# Patient Record
Sex: FEMALE | Race: WHITE | Employment: OTHER | URBAN - METROPOLITAN AREA
[De-identification: names, ages, dates, MRNs, and addresses within clinical notes are randomized per-mention and may not be internally consistent; named-entity substitution may affect disease eponyms.]

---

## 2018-08-30 ENCOUNTER — APPOINTMENT (OUTPATIENT)
Dept: CT IMAGING | Age: 68
DRG: 086 | End: 2018-08-30
Payer: MEDICARE

## 2018-08-30 ENCOUNTER — APPOINTMENT (OUTPATIENT)
Dept: GENERAL RADIOLOGY | Age: 68
DRG: 086 | End: 2018-08-30
Payer: MEDICARE

## 2018-08-30 ENCOUNTER — HOSPITAL ENCOUNTER (INPATIENT)
Age: 68
LOS: 6 days | Discharge: NURSING, REHAB OR LONG-TERM CARE | DRG: 086 | End: 2018-09-06
Attending: EMERGENCY MEDICINE | Admitting: SURGERY
Payer: MEDICARE

## 2018-08-30 DIAGNOSIS — I62.00 SUBDURAL HEMORRHAGE (HCC): ICD-10-CM

## 2018-08-30 DIAGNOSIS — S22.41XA CLOSED FRACTURE OF MULTIPLE RIBS OF RIGHT SIDE, INITIAL ENCOUNTER: ICD-10-CM

## 2018-08-30 DIAGNOSIS — S43.024A CLOSED POSTERIOR DISLOCATION OF RIGHT SHOULDER, INITIAL ENCOUNTER: Primary | ICD-10-CM

## 2018-08-30 PROBLEM — S06.0X1A CONCUSSION WITH LOSS OF CONSCIOUSNESS OF 30 MINUTES OR LESS: Status: ACTIVE | Noted: 2018-08-30

## 2018-08-30 PROBLEM — S43.004A DISLOCATION OF RIGHT SHOULDER JOINT: Status: ACTIVE | Noted: 2018-08-30

## 2018-08-30 PROBLEM — I60.9 SAH (SUBARACHNOID HEMORRHAGE) (HCC): Status: ACTIVE | Noted: 2018-08-30

## 2018-08-30 LAB
% INHIBITION AA: 100 %
% INHIBITION ADP: 51.5 %
ABO/RH: NORMAL
ACETAMINOPHEN LEVEL: <5 MCG/ML (ref 10–30)
ALBUMIN SERPL-MCNC: 4.2 G/DL (ref 3.5–5.2)
ALP BLD-CCNC: 68 U/L (ref 35–104)
ALT SERPL-CCNC: 86 U/L (ref 0–32)
ANGLE (CLOT STRENGTH): 76.1 DEGREE (ref 59–74)
ANION GAP SERPL CALCULATED.3IONS-SCNC: 14 MMOL/L (ref 7–16)
ANTIBODY SCREEN: NORMAL
APTT: 28.1 SEC (ref 24.5–35.1)
AST SERPL-CCNC: 138 U/L (ref 0–31)
B.E.: -1.7 MMOL/L (ref -3–3)
BILIRUB SERPL-MCNC: 0.4 MG/DL (ref 0–1.2)
BUN BLDV-MCNC: 21 MG/DL (ref 8–23)
CALCIUM SERPL-MCNC: 9.2 MG/DL (ref 8.6–10.2)
CHLORIDE BLD-SCNC: 103 MMOL/L (ref 98–107)
CO2: 23 MMOL/L (ref 22–29)
COHB: 0.5 % (ref 0–1.5)
COMMENT: ABNORMAL
CREAT SERPL-MCNC: 1.4 MG/DL (ref 0.5–1)
CRITICAL: ABNORMAL
DATE ANALYZED: ABNORMAL
DATE OF COLLECTION: ABNORMAL
EPL-TEG: 16.9 % (ref 0–15)
ETHANOL: <10 MG/DL (ref 0–0.08)
G-TEG: 8.3 K D/SC (ref 4.5–11)
GFR AFRICAN AMERICAN: 45
GFR NON-AFRICAN AMERICAN: 37 ML/MIN/1.73
GLUCOSE BLD-MCNC: 122 MG/DL (ref 74–109)
HCO3: 19.3 MMOL/L (ref 22–26)
HCT VFR BLD CALC: 40.6 % (ref 34–48)
HEMOGLOBIN: 13.8 G/DL (ref 11.5–15.5)
HHB: 0.6 % (ref 0–5)
INR BLD: 1
K (CLOTTING TIME): 0.9 MIN (ref 1–3)
LAB: ABNORMAL
LACTIC ACID: 1.4 MMOL/L (ref 0.5–2.2)
LY30 (FIBRINOLYSIS): 9.6 % (ref 0–8)
Lab: 1828
MA (MAX AMPLITUDE): 62.3 MM (ref 50–70)
MA-AA: 8.4 MM
MA-ACTIVATED: 9.3 MM
MA-ADP: 35 MM
MA-TEG BASELINE: 62.3 MM
MCH RBC QN AUTO: 29.5 PG (ref 26–35)
MCHC RBC AUTO-ENTMCNC: 34 % (ref 32–34.5)
MCV RBC AUTO: 86.8 FL (ref 80–99.9)
METHB: 0.1 % (ref 0–1.5)
MODE: ABNORMAL
O2 SATURATION: 99.4 % (ref 92–98.5)
O2HB: 98.8 % (ref 94–97)
OPERATOR ID: ABNORMAL
PATIENT TEMP: 37 C
PCO2: 24.3 MMHG (ref 35–45)
PDW BLD-RTO: 12.9 FL (ref 11.5–15)
PH BLOOD GAS: 7.52 (ref 7.35–7.45)
PLATELET # BLD: 243 E9/L (ref 130–450)
PMV BLD AUTO: 9.9 FL (ref 7–12)
PO2: 228.9 MMHG (ref 60–100)
POTASSIUM SERPL-SCNC: 3.51 MMOL/L (ref 3.3–5.1)
POTASSIUM SERPL-SCNC: 3.7 MMOL/L (ref 3.5–5)
PROTHROMBIN TIME: 11 SEC (ref 9.3–12.4)
R (REACTION TIME): 3.6 MIN (ref 5–10)
RBC # BLD: 4.68 E12/L (ref 3.5–5.5)
SALICYLATE, SERUM: <0.3 MG/DL (ref 0–30)
SODIUM BLD-SCNC: 140 MMOL/L (ref 132–146)
SOURCE, BLOOD GAS: ABNORMAL
THB: 14.5 G/DL (ref 11.5–16.5)
TIME ANALYZED: 1832
TOTAL PROTEIN: 6.7 G/DL (ref 6.4–8.3)
TRICYCLIC ANTIDEPRESSANTS SCREEN SERUM: NEGATIVE NG/ML
WBC # BLD: 10.6 E9/L (ref 4.5–11.5)

## 2018-08-30 PROCEDURE — 99223 1ST HOSP IP/OBS HIGH 75: CPT | Performed by: SURGERY

## 2018-08-30 PROCEDURE — 94150 VITAL CAPACITY TEST: CPT

## 2018-08-30 PROCEDURE — 85027 COMPLETE CBC AUTOMATED: CPT

## 2018-08-30 PROCEDURE — 2500000003 HC RX 250 WO HCPCS: Performed by: EMERGENCY MEDICINE

## 2018-08-30 PROCEDURE — 85576 BLOOD PLATELET AGGREGATION: CPT

## 2018-08-30 PROCEDURE — 99291 CRITICAL CARE FIRST HOUR: CPT

## 2018-08-30 PROCEDURE — 74177 CT ABD & PELVIS W/CONTRAST: CPT

## 2018-08-30 PROCEDURE — 96375 TX/PRO/DX INJ NEW DRUG ADDON: CPT

## 2018-08-30 PROCEDURE — 73590 X-RAY EXAM OF LOWER LEG: CPT

## 2018-08-30 PROCEDURE — G0480 DRUG TEST DEF 1-7 CLASSES: HCPCS

## 2018-08-30 PROCEDURE — 86850 RBC ANTIBODY SCREEN: CPT

## 2018-08-30 PROCEDURE — 70486 CT MAXILLOFACIAL W/O DYE: CPT

## 2018-08-30 PROCEDURE — 72125 CT NECK SPINE W/O DYE: CPT

## 2018-08-30 PROCEDURE — 85384 FIBRINOGEN ACTIVITY: CPT

## 2018-08-30 PROCEDURE — 6360000002 HC RX W HCPCS: Performed by: STUDENT IN AN ORGANIZED HEALTH CARE EDUCATION/TRAINING PROGRAM

## 2018-08-30 PROCEDURE — 84132 ASSAY OF SERUM POTASSIUM: CPT

## 2018-08-30 PROCEDURE — 6360000002 HC RX W HCPCS

## 2018-08-30 PROCEDURE — 72170 X-RAY EXAM OF PELVIS: CPT

## 2018-08-30 PROCEDURE — 0RSJXZZ REPOSITION RIGHT SHOULDER JOINT, EXTERNAL APPROACH: ICD-10-PCS | Performed by: EMERGENCY MEDICINE

## 2018-08-30 PROCEDURE — 80053 COMPREHEN METABOLIC PANEL: CPT

## 2018-08-30 PROCEDURE — 85610 PROTHROMBIN TIME: CPT

## 2018-08-30 PROCEDURE — 26600 TREAT METACARPAL FRACTURE: CPT | Performed by: ORTHOPAEDIC SURGERY

## 2018-08-30 PROCEDURE — 96374 THER/PROPH/DIAG INJ IV PUSH: CPT

## 2018-08-30 PROCEDURE — 86900 BLOOD TYPING SEROLOGIC ABO: CPT

## 2018-08-30 PROCEDURE — 6360000004 HC RX CONTRAST MEDICATION: Performed by: RADIOLOGY

## 2018-08-30 PROCEDURE — 36410 VNPNXR 3YR/> PHY/QHP DX/THER: CPT | Performed by: SURGERY

## 2018-08-30 PROCEDURE — 85730 THROMBOPLASTIN TIME PARTIAL: CPT

## 2018-08-30 PROCEDURE — 2700000000 HC OXYGEN THERAPY PER DAY

## 2018-08-30 PROCEDURE — 73130 X-RAY EXAM OF HAND: CPT

## 2018-08-30 PROCEDURE — 73030 X-RAY EXAM OF SHOULDER: CPT

## 2018-08-30 PROCEDURE — 82805 BLOOD GASES W/O2 SATURATION: CPT

## 2018-08-30 PROCEDURE — G0390 TRAUMA RESPONS W/HOSP CRITI: HCPCS

## 2018-08-30 PROCEDURE — 96376 TX/PRO/DX INJ SAME DRUG ADON: CPT

## 2018-08-30 PROCEDURE — 70450 CT HEAD/BRAIN W/O DYE: CPT

## 2018-08-30 PROCEDURE — 36600 WITHDRAWAL OF ARTERIAL BLOOD: CPT | Performed by: SURGERY

## 2018-08-30 PROCEDURE — 85347 COAGULATION TIME ACTIVATED: CPT

## 2018-08-30 PROCEDURE — 73060 X-RAY EXAM OF HUMERUS: CPT

## 2018-08-30 PROCEDURE — 2580000003 HC RX 258: Performed by: STUDENT IN AN ORGANIZED HEALTH CARE EDUCATION/TRAINING PROGRAM

## 2018-08-30 PROCEDURE — 73070 X-RAY EXAM OF ELBOW: CPT

## 2018-08-30 PROCEDURE — 80307 DRUG TEST PRSMV CHEM ANLYZR: CPT

## 2018-08-30 PROCEDURE — 73560 X-RAY EXAM OF KNEE 1 OR 2: CPT

## 2018-08-30 PROCEDURE — 71260 CT THORAX DX C+: CPT

## 2018-08-30 PROCEDURE — 99221 1ST HOSP IP/OBS SF/LOW 40: CPT | Performed by: ORTHOPAEDIC SURGERY

## 2018-08-30 PROCEDURE — 86901 BLOOD TYPING SEROLOGIC RH(D): CPT

## 2018-08-30 PROCEDURE — 36415 COLL VENOUS BLD VENIPUNCTURE: CPT

## 2018-08-30 PROCEDURE — 71045 X-RAY EXAM CHEST 1 VIEW: CPT

## 2018-08-30 PROCEDURE — 73200 CT UPPER EXTREMITY W/O DYE: CPT

## 2018-08-30 PROCEDURE — 83605 ASSAY OF LACTIC ACID: CPT

## 2018-08-30 PROCEDURE — 6360000002 HC RX W HCPCS: Performed by: EMERGENCY MEDICINE

## 2018-08-30 PROCEDURE — 73090 X-RAY EXAM OF FOREARM: CPT

## 2018-08-30 RX ORDER — KETAMINE HYDROCHLORIDE 10 MG/ML
1 INJECTION, SOLUTION INTRAMUSCULAR; INTRAVENOUS ONCE
Status: COMPLETED | OUTPATIENT
Start: 2018-08-30 | End: 2018-08-30

## 2018-08-30 RX ORDER — PROPOFOL 10 MG/ML
1 INJECTION, EMULSION INTRAVENOUS ONCE
Status: COMPLETED | OUTPATIENT
Start: 2018-08-30 | End: 2018-08-30

## 2018-08-30 RX ORDER — MORPHINE SULFATE 2 MG/ML
2 INJECTION, SOLUTION INTRAMUSCULAR; INTRAVENOUS
Status: DISCONTINUED | OUTPATIENT
Start: 2018-08-30 | End: 2018-09-06 | Stop reason: HOSPADM

## 2018-08-30 RX ORDER — LEVETIRACETAM 5 MG/ML
500 INJECTION INTRAVASCULAR EVERY 12 HOURS
Status: DISCONTINUED | OUTPATIENT
Start: 2018-08-30 | End: 2018-08-31

## 2018-08-30 RX ORDER — PROPOFOL 10 MG/ML
INJECTION, EMULSION INTRAVENOUS
Status: DISCONTINUED
Start: 2018-08-30 | End: 2018-08-31

## 2018-08-30 RX ORDER — SODIUM CHLORIDE 9 MG/ML
INJECTION, SOLUTION INTRAVENOUS CONTINUOUS
Status: DISCONTINUED | OUTPATIENT
Start: 2018-08-30 | End: 2018-08-31 | Stop reason: ALTCHOICE

## 2018-08-30 RX ORDER — FENTANYL CITRATE 50 UG/ML
INJECTION, SOLUTION INTRAMUSCULAR; INTRAVENOUS DAILY PRN
Status: COMPLETED | OUTPATIENT
Start: 2018-08-30 | End: 2018-08-30

## 2018-08-30 RX ORDER — ONDANSETRON 2 MG/ML
4 INJECTION INTRAMUSCULAR; INTRAVENOUS EVERY 6 HOURS PRN
Status: DISCONTINUED | OUTPATIENT
Start: 2018-08-30 | End: 2018-09-06 | Stop reason: HOSPADM

## 2018-08-30 RX ORDER — ONDANSETRON 2 MG/ML
INJECTION INTRAMUSCULAR; INTRAVENOUS DAILY PRN
Status: COMPLETED | OUTPATIENT
Start: 2018-08-30 | End: 2018-08-30

## 2018-08-30 RX ADMIN — IOPAMIDOL 110 ML: 755 INJECTION, SOLUTION INTRAVENOUS at 19:33

## 2018-08-30 RX ADMIN — FENTANYL CITRATE 50 MCG: 50 INJECTION, SOLUTION INTRAMUSCULAR; INTRAVENOUS at 18:33

## 2018-08-30 RX ADMIN — ONDANSETRON 4 MG: 2 SOLUTION INTRAMUSCULAR; INTRAVENOUS at 20:01

## 2018-08-30 RX ADMIN — SODIUM CHLORIDE: 9 INJECTION, SOLUTION INTRAVENOUS at 18:35

## 2018-08-30 RX ADMIN — PROPOFOL 20 MG: 10 INJECTION, EMULSION INTRAVENOUS at 20:30

## 2018-08-30 RX ADMIN — MORPHINE SULFATE 2 MG: 2 INJECTION, SOLUTION INTRAMUSCULAR; INTRAVENOUS at 20:00

## 2018-08-30 RX ADMIN — PROPOFOL 40 MG: 10 INJECTION, EMULSION INTRAVENOUS at 20:30

## 2018-08-30 RX ADMIN — KETAMINE HYDROCHLORIDE 40 MG: 10 INJECTION, SOLUTION INTRAMUSCULAR; INTRAVENOUS at 20:30

## 2018-08-30 RX ADMIN — LEVETIRACETAM 500 MG: 5 INJECTION INTRAVENOUS at 20:57

## 2018-08-30 RX ADMIN — ONDANSETRON HYDROCHLORIDE 4 MG: 2 INJECTION, SOLUTION INTRAMUSCULAR; INTRAVENOUS at 18:33

## 2018-08-30 ASSESSMENT — PAIN SCALES - GENERAL
PAINLEVEL_OUTOF10: 10
PAINLEVEL_OUTOF10: 10

## 2018-08-30 NOTE — ED NOTES
To RUE  brusing     deformity to R shoulder  R elbow   pupils 5+ reactive  Bilaterally     GCS [address], RN  08/30/18 8950
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
## 2018-08-30 NOTE — ED NOTES
Dr. Kaela Mead PCP  No smoke  ETOH occasionally  No drug use     Ecchynosis to L side of face, L side of hip, R , R lower leg      Lux Lloyd, RN  08/30/18 Pk Hudson, RN  08/30/18 [id number]
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
## 2018-08-30 NOTE — CONSULTS
anxiety    PHYSICAL EXAM:    VITALS:  /71   Pulse 63   Temp 97 °F (36.1 °C)   Resp 21   Ht 5' 4\" (1.626 m)   Wt 170 lb (77.1 kg)   SpO2 93%   BMI 29.18 kg/m²   CONSTITUTIONAL:  awake, alert, cooperative, no apparent distress, and appears stated age  MUSCULOSKELETAL:  Right upper Extremity:  · Right shoulder Skin intact circumferentially  · +TTP right shoulder, elbow and hand  · Positive tenderness of the ulnar aspect of the 4th and 5th metacarpal  · Positive crossover finger deformity about 60% of the 4th finger under the 3rd finger  · Positive ecchymosis over the 4th and 5th metacarpals. · Shoulder in internal and adducted position  ·  Compartments soft and compressible  · +AIN/PIN/Ulnar nerve function intact grossly  · +Radial pulse, Brisk Cap refill, hand warm and perfused  · Sensation intact to touch in radial/ulnar/median nerve distributions to hand      Secondary Exam:   · leftUE: No obvious signs of trauma. -TTP to fingers, hand, wrist, forearm, elbow, humerus, shoulder or clavicle. compartments soft and compressible.     · rightLE: + tenderness and ecchymosis over the proximal tibia     DATA:    CBC:   Lab Results   Component Value Date    WBC 10.6 08/30/2018    RBC 4.68 08/30/2018    HGB 13.8 08/30/2018    HCT 40.6 08/30/2018    MCV 86.8 08/30/2018    MCH 29.5 08/30/2018    MCHC 34.0 08/30/2018    RDW 12.9 08/30/2018     08/30/2018    MPV 9.9 08/30/2018     PT/INR:    Lab Results   Component Value Date    PROTIME 11.0 08/30/2018    INR 1.0 08/30/2018     CRP/ESR: No results found for: CRP, SEDRATE  Lactic Acid :   Lab Results   Component Value Date    LACTA 1.4 08/30/2018       Radiology Review:  08/30/18 - CT Chest  R Posterior Shoulder dislocation with reverse hill sachs lesion with no glenoid fractures     Right hand x-ray  Demonstrating 4th metacarpal base fracture    CT abdomen and pelvis  Comminuted intra-articular 4th metacarpal base fracture seen on CT of the abdomen with mild subluxation dorsally. X-ray right elbow   no acute fractures or dislocations    X-ray right radius and ulna  No acute fractures or dislocations noted    Postreduction CT scan right shoulder  Demonstrating adequate reduction, small reverse Hill-Sachs lesion, no associated glenoid fractures noted. IMPRESSION:   Right Posterior Shoulder dislocation with reverse hill sachs lesion  4th metacarpal base fracture    PLAN:  NWB - RUE  After informed consent was verbally obtained, Closed reduction was then performed with sedation by the ER staff,  application of sling. Patient tolerated well and remained neurovascularly intact pre and post reduction  Pain Control per trauma  Well-padded ulnar gutter splint applied to the right hand.   Most likely operative fixation for 4th metacarpal fracture base dislocation  Nothing by mouth after midnight  Continue ice and elevation to decrease swelling  · Xr pending of other extremities at this time  · Discussed with Dr. Greg Costa
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
## 2018-08-30 NOTE — ED NOTES
Pt returned from CT . Pt placed on cardiac monitor and pulse oximeter. Pt blood pressure cycling every 15 minutes. Fluids infusing in left AC. Report given to section RN. Pt alert and oriented x4.       Glen Varela RN  08/30/18 [id number]

## 2018-08-30 NOTE — ED NOTES
1 gold hoop earring placed in collection container and placed in patient belongings bag      Etta Hardwick RN  08/30/18 9373

## 2018-08-31 ENCOUNTER — APPOINTMENT (OUTPATIENT)
Dept: CT IMAGING | Age: 68
DRG: 086 | End: 2018-08-31
Payer: MEDICARE

## 2018-08-31 PROBLEM — S06.5XAA SUBDURAL HEMATOMA (HCC): Status: ACTIVE | Noted: 2018-08-31
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
## 2018-08-31 LAB
ALBUMIN SERPL-MCNC: 3.8 G/DL (ref 3.5–5.2)
ALP BLD-CCNC: 51 U/L (ref 35–104)
ALT SERPL-CCNC: 90 U/L (ref 0–32)
AMPHETAMINE SCREEN, URINE: NOT DETECTED
ANION GAP SERPL CALCULATED.3IONS-SCNC: 16 MMOL/L (ref 7–16)
AST SERPL-CCNC: 162 U/L (ref 0–31)
BARBITURATE SCREEN URINE: NOT DETECTED
BASOPHILS ABSOLUTE: 0.02 E9/L (ref 0–0.2)
BASOPHILS RELATIVE PERCENT: 0.2 % (ref 0–2)
BENZODIAZEPINE SCREEN, URINE: NOT DETECTED
BILIRUB SERPL-MCNC: 0.6 MG/DL (ref 0–1.2)
BUN BLDV-MCNC: 20 MG/DL (ref 8–23)
CALCIUM SERPL-MCNC: 8.7 MG/DL (ref 8.6–10.2)
CANNABINOID SCREEN URINE: NOT DETECTED
CHLORIDE BLD-SCNC: 103 MMOL/L (ref 98–107)
CO2: 22 MMOL/L (ref 22–29)
COCAINE METABOLITE SCREEN URINE: NOT DETECTED
CREAT SERPL-MCNC: 1.1 MG/DL (ref 0.5–1)
EOSINOPHILS ABSOLUTE: 0 E9/L (ref 0.05–0.5)
EOSINOPHILS RELATIVE PERCENT: 0 % (ref 0–6)
GFR AFRICAN AMERICAN: 60
GFR NON-AFRICAN AMERICAN: 50 ML/MIN/1.73
GLUCOSE BLD-MCNC: 162 MG/DL (ref 74–109)
HCT VFR BLD CALC: 35.9 % (ref 34–48)
HEMOGLOBIN: 12 G/DL (ref 11.5–15.5)
IMMATURE GRANULOCYTES #: 0.05 E9/L
IMMATURE GRANULOCYTES %: 0.6 % (ref 0–5)
LYMPHOCYTES ABSOLUTE: 0.79 E9/L (ref 1.5–4)
LYMPHOCYTES RELATIVE PERCENT: 9.1 % (ref 20–42)
MCH RBC QN AUTO: 29.6 PG (ref 26–35)
MCHC RBC AUTO-ENTMCNC: 33.4 % (ref 32–34.5)
MCV RBC AUTO: 88.6 FL (ref 80–99.9)
METHADONE SCREEN, URINE: NOT DETECTED
MONOCYTES ABSOLUTE: 0.76 E9/L (ref 0.1–0.95)
MONOCYTES RELATIVE PERCENT: 8.8 % (ref 2–12)
NEUTROPHILS ABSOLUTE: 7.04 E9/L (ref 1.8–7.3)
NEUTROPHILS RELATIVE PERCENT: 81.3 % (ref 43–80)
OPIATE SCREEN URINE: POSITIVE
PDW BLD-RTO: 12.9 FL (ref 11.5–15)
PHENCYCLIDINE SCREEN URINE: NOT DETECTED
PLATELET # BLD: 222 E9/L (ref 130–450)
PMV BLD AUTO: 9.9 FL (ref 7–12)
POTASSIUM REFLEX MAGNESIUM: 3.6 MMOL/L (ref 3.5–5)
PROPOXYPHENE SCREEN: NOT DETECTED
RBC # BLD: 4.05 E12/L (ref 3.5–5.5)
SODIUM BLD-SCNC: 141 MMOL/L (ref 132–146)
TOTAL PROTEIN: 6 G/DL (ref 6.4–8.3)
WBC # BLD: 8.7 E9/L (ref 4.5–11.5)

## 2018-08-31 PROCEDURE — 70450 CT HEAD/BRAIN W/O DYE: CPT

## 2018-08-31 PROCEDURE — 6360000002 HC RX W HCPCS: Performed by: STUDENT IN AN ORGANIZED HEALTH CARE EDUCATION/TRAINING PROGRAM

## 2018-08-31 PROCEDURE — 2060000000 HC ICU INTERMEDIATE R&B

## 2018-08-31 PROCEDURE — 99222 1ST HOSP IP/OBS MODERATE 55: CPT | Performed by: NEUROLOGICAL SURGERY

## 2018-08-31 PROCEDURE — 2580000003 HC RX 258: Performed by: STUDENT IN AN ORGANIZED HEALTH CARE EDUCATION/TRAINING PROGRAM

## 2018-08-31 PROCEDURE — 99232 SBSQ HOSP IP/OBS MODERATE 35: CPT | Performed by: SURGERY

## 2018-08-31 PROCEDURE — 6370000000 HC RX 637 (ALT 250 FOR IP): Performed by: SURGERY

## 2018-08-31 PROCEDURE — 2700000000 HC OXYGEN THERAPY PER DAY

## 2018-08-31 PROCEDURE — G0480 DRUG TEST DEF 1-7 CLASSES: HCPCS

## 2018-08-31 PROCEDURE — 36415 COLL VENOUS BLD VENIPUNCTURE: CPT

## 2018-08-31 PROCEDURE — 80053 COMPREHEN METABOLIC PANEL: CPT

## 2018-08-31 PROCEDURE — 80307 DRUG TEST PRSMV CHEM ANLYZR: CPT

## 2018-08-31 PROCEDURE — 99024 POSTOP FOLLOW-UP VISIT: CPT | Performed by: ORTHOPAEDIC SURGERY

## 2018-08-31 PROCEDURE — 85025 COMPLETE CBC W/AUTO DIFF WBC: CPT

## 2018-08-31 PROCEDURE — 6370000000 HC RX 637 (ALT 250 FOR IP): Performed by: STUDENT IN AN ORGANIZED HEALTH CARE EDUCATION/TRAINING PROGRAM

## 2018-08-31 RX ORDER — SODIUM CHLORIDE 0.9 % (FLUSH) 0.9 %
10 SYRINGE (ML) INJECTION PRN
Status: DISCONTINUED | OUTPATIENT
Start: 2018-08-31 | End: 2018-09-06 | Stop reason: HOSPADM

## 2018-08-31 RX ORDER — PANTOPRAZOLE SODIUM 40 MG/1
40 TABLET, DELAYED RELEASE ORAL DAILY
COMMUNITY
End: 2020-08-28 | Stop reason: SDUPTHER

## 2018-08-31 RX ORDER — BISACODYL 10 MG
10 SUPPOSITORY, RECTAL RECTAL DAILY PRN
Status: DISCONTINUED | OUTPATIENT
Start: 2018-08-31 | End: 2018-09-06 | Stop reason: HOSPADM

## 2018-08-31 RX ORDER — OXYCODONE HYDROCHLORIDE 10 MG/1
10 TABLET ORAL EVERY 4 HOURS PRN
Status: DISCONTINUED | OUTPATIENT
Start: 2018-08-31 | End: 2018-09-06 | Stop reason: HOSPADM

## 2018-08-31 RX ORDER — SODIUM CHLORIDE 0.9 % (FLUSH) 0.9 %
10 SYRINGE (ML) INJECTION EVERY 12 HOURS SCHEDULED
Status: DISCONTINUED | OUTPATIENT
Start: 2018-08-31 | End: 2018-09-06 | Stop reason: HOSPADM

## 2018-08-31 RX ORDER — VITAMIN B COMPLEX
1 CAPSULE ORAL DAILY
COMMUNITY

## 2018-08-31 RX ORDER — HYOSCYAMINE SULFATE 0.125 MG
125 TABLET ORAL EVERY 4 HOURS PRN
COMMUNITY
End: 2018-11-14 | Stop reason: SDUPTHER

## 2018-08-31 RX ORDER — TRIAMTERENE AND HYDROCHLOROTHIAZIDE 37.5; 25 MG/1; MG/1
1 TABLET ORAL DAILY
COMMUNITY
End: 2020-08-28 | Stop reason: SDUPTHER

## 2018-08-31 RX ORDER — LEVOTHYROXINE SODIUM 0.03 MG/1
25 TABLET ORAL DAILY
COMMUNITY
End: 2018-11-14 | Stop reason: SDUPTHER

## 2018-08-31 RX ORDER — SODIUM CHLORIDE 9 MG/ML
INJECTION, SOLUTION INTRAVENOUS CONTINUOUS
Status: DISCONTINUED | OUTPATIENT
Start: 2018-08-31 | End: 2018-09-04

## 2018-08-31 RX ORDER — ESTRADIOL AND NORETHINDRONE ACETATE .5; .1 MG/1; MG/1
1 TABLET ORAL DAILY
COMMUNITY
End: 2022-01-03 | Stop reason: SDUPTHER

## 2018-08-31 RX ORDER — ALPRAZOLAM 1 MG/1
1 TABLET ORAL 2 TIMES DAILY PRN
COMMUNITY
End: 2020-10-09 | Stop reason: SDUPTHER

## 2018-08-31 RX ORDER — AMLODIPINE BESYLATE 10 MG/1
10 TABLET ORAL
COMMUNITY
End: 2018-11-14 | Stop reason: DRUGHIGH

## 2018-08-31 RX ORDER — LEVETIRACETAM 500 MG/1
500 TABLET ORAL 2 TIMES DAILY
Status: COMPLETED | OUTPATIENT
Start: 2018-08-31 | End: 2018-09-06

## 2018-08-31 RX ORDER — ACETAMINOPHEN 325 MG/1
650 TABLET ORAL EVERY 4 HOURS PRN
Status: DISCONTINUED | OUTPATIENT
Start: 2018-08-31 | End: 2018-09-06 | Stop reason: HOSPADM

## 2018-08-31 RX ORDER — MORPHINE SULFATE 2 MG/ML
2 INJECTION, SOLUTION INTRAMUSCULAR; INTRAVENOUS EVERY 4 HOURS PRN
Status: DISCONTINUED | OUTPATIENT
Start: 2018-08-31 | End: 2018-09-06 | Stop reason: HOSPADM

## 2018-08-31 RX ORDER — AMOXICILLIN 500 MG
1 CAPSULE ORAL DAILY
COMMUNITY
End: 2018-11-14 | Stop reason: SDUPTHER

## 2018-08-31 RX ORDER — DOCUSATE SODIUM 100 MG/1
100 CAPSULE, LIQUID FILLED ORAL 2 TIMES DAILY
Status: DISCONTINUED | OUTPATIENT
Start: 2018-08-31 | End: 2018-09-06 | Stop reason: HOSPADM

## 2018-08-31 RX ORDER — OXYCODONE HYDROCHLORIDE 5 MG/1
5 TABLET ORAL EVERY 4 HOURS PRN
Status: DISCONTINUED | OUTPATIENT
Start: 2018-08-31 | End: 2018-09-06 | Stop reason: HOSPADM

## 2018-08-31 RX ORDER — LIDOCAINE 50 MG/G
1 PATCH TOPICAL DAILY
Status: DISCONTINUED | OUTPATIENT
Start: 2018-08-31 | End: 2018-09-06 | Stop reason: HOSPADM

## 2018-08-31 RX ORDER — OMEGA-3S/DHA/EPA/FISH OIL/D3 300MG-1000
400 CAPSULE ORAL DAILY
COMMUNITY

## 2018-08-31 RX ADMIN — Medication 10 ML: at 20:44

## 2018-08-31 RX ADMIN — SODIUM CHLORIDE: 9 INJECTION, SOLUTION INTRAVENOUS at 01:42

## 2018-08-31 RX ADMIN — LEVETIRACETAM 500 MG: 500 TABLET, FILM COATED ORAL at 09:00

## 2018-08-31 RX ADMIN — MORPHINE SULFATE 2 MG: 2 INJECTION, SOLUTION INTRAMUSCULAR; INTRAVENOUS at 20:44

## 2018-08-31 RX ADMIN — DOCUSATE SODIUM 100 MG: 100 CAPSULE, LIQUID FILLED ORAL at 20:44

## 2018-08-31 RX ADMIN — MORPHINE SULFATE 2 MG: 2 INJECTION, SOLUTION INTRAMUSCULAR; INTRAVENOUS at 03:17

## 2018-08-31 RX ADMIN — ACETAMINOPHEN 650 MG: 325 TABLET, FILM COATED ORAL at 05:45

## 2018-08-31 RX ADMIN — OXYCODONE HYDROCHLORIDE 5 MG: 5 TABLET ORAL at 14:53

## 2018-08-31 RX ADMIN — OXYCODONE HYDROCHLORIDE 10 MG: 10 TABLET ORAL at 05:45

## 2018-08-31 RX ADMIN — SODIUM CHLORIDE: 9 INJECTION, SOLUTION INTRAVENOUS at 20:44

## 2018-08-31 RX ADMIN — LEVETIRACETAM 500 MG: 500 TABLET, FILM COATED ORAL at 20:44

## 2018-08-31 RX ADMIN — SODIUM CHLORIDE: 9 INJECTION, SOLUTION INTRAVENOUS at 03:12

## 2018-08-31 RX ADMIN — DOCUSATE SODIUM 100 MG: 100 CAPSULE, LIQUID FILLED ORAL at 09:00

## 2018-08-31 ASSESSMENT — PAIN DESCRIPTION - ORIENTATION
ORIENTATION: RIGHT

## 2018-08-31 ASSESSMENT — PAIN DESCRIPTION - LOCATION
LOCATION: HEAD
LOCATION: HEAD;GENERALIZED

## 2018-08-31 ASSESSMENT — PAIN SCALES - GENERAL
PAINLEVEL_OUTOF10: 9
PAINLEVEL_OUTOF10: 0
PAINLEVEL_OUTOF10: 9
PAINLEVEL_OUTOF10: 5
PAINLEVEL_OUTOF10: 10
PAINLEVEL_OUTOF10: 9
PAINLEVEL_OUTOF10: 8
PAINLEVEL_OUTOF10: 9
PAINLEVEL_OUTOF10: 8
PAINLEVEL_OUTOF10: 10

## 2018-08-31 ASSESSMENT — PAIN DESCRIPTION - FREQUENCY
FREQUENCY: CONTINUOUS

## 2018-08-31 ASSESSMENT — PAIN DESCRIPTION - PAIN TYPE
TYPE: ACUTE PAIN

## 2018-08-31 ASSESSMENT — PAIN DESCRIPTION - DESCRIPTORS
DESCRIPTORS: ACHING;CONSTANT;DISCOMFORT
DESCRIPTORS: ACHING;CONSTANT;DISCOMFORT
DESCRIPTORS: DISCOMFORT;HEADACHE;SORE
DESCRIPTORS: ACHING;CONSTANT;DISCOMFORT

## 2018-08-31 ASSESSMENT — PAIN DESCRIPTION - PROGRESSION
CLINICAL_PROGRESSION: GRADUALLY IMPROVING
CLINICAL_PROGRESSION: GRADUALLY IMPROVING

## 2018-08-31 ASSESSMENT — PAIN DESCRIPTION - ONSET
ONSET: ON-GOING
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
## 2018-08-31 NOTE — PROGRESS NOTES
Physical Therapy  PT SESSION ATTEMPT     Date:2018  Patient Name: Emili Jolley  MRN: [id number]  : 1950  Room: [address] [place], [place] [address]     Attempted PT session this date:    [] unavailable due to other medical staff currently with pt   [] on hold per nursing staff   [] on hold per nursing staff secondary to lab / radiology results    [x] declined Physical Therapy  this date due to _pain__. Benefits of participation in therapy reviewed with pt.    [] off unit   [] Other:     Attempted PT multiple times 18. Pt off floor for CT in AM and refusing in PM secondary to pain. Educated on need for mobility and that PT will be in to see her tomorrow, 18. PT will re-attempt as able.      Tu Chow, PT, DPT  [id number]

## 2018-08-31 NOTE — PLAN OF CARE
Problem: Pain:  Goal: Pain level will decrease  Pain level will decrease   Outcome: Not Met This Shift  Patient has continued pain to head and left arm

## 2018-09-01 LAB
ALBUMIN SERPL-MCNC: 3.3 G/DL (ref 3.5–5.2)
ALP BLD-CCNC: 38 U/L (ref 35–104)
ALT SERPL-CCNC: 71 U/L (ref 0–32)
ANION GAP SERPL CALCULATED.3IONS-SCNC: 10 MMOL/L (ref 7–16)
AST SERPL-CCNC: 94 U/L (ref 0–31)
BASOPHILS ABSOLUTE: 0.05 E9/L (ref 0–0.2)
BASOPHILS RELATIVE PERCENT: 0.7 % (ref 0–2)
BILIRUB SERPL-MCNC: 0.6 MG/DL (ref 0–1.2)
BUN BLDV-MCNC: 18 MG/DL (ref 8–23)
CALCIUM SERPL-MCNC: 8.3 MG/DL (ref 8.6–10.2)
CHLORIDE BLD-SCNC: 104 MMOL/L (ref 98–107)
CO2: 25 MMOL/L (ref 22–29)
CREAT SERPL-MCNC: 0.9 MG/DL (ref 0.5–1)
EOSINOPHILS ABSOLUTE: 0.05 E9/L (ref 0.05–0.5)
EOSINOPHILS RELATIVE PERCENT: 0.7 % (ref 0–6)
GFR AFRICAN AMERICAN: >60
GFR NON-AFRICAN AMERICAN: >60 ML/MIN/1.73
GLUCOSE BLD-MCNC: 106 MG/DL (ref 74–109)
HCT VFR BLD CALC: 31.8 % (ref 34–48)
HEMOGLOBIN: 10.4 G/DL (ref 11.5–15.5)
IMMATURE GRANULOCYTES #: 0.02 E9/L
IMMATURE GRANULOCYTES %: 0.3 % (ref 0–5)
LYMPHOCYTES ABSOLUTE: 1.42 E9/L (ref 1.5–4)
LYMPHOCYTES RELATIVE PERCENT: 20.3 % (ref 20–42)
MAGNESIUM: 2.2 MG/DL (ref 1.6–2.6)
MCH RBC QN AUTO: 29.5 PG (ref 26–35)
MCHC RBC AUTO-ENTMCNC: 32.7 % (ref 32–34.5)
MCV RBC AUTO: 90.1 FL (ref 80–99.9)
MONOCYTES ABSOLUTE: 0.89 E9/L (ref 0.1–0.95)
MONOCYTES RELATIVE PERCENT: 12.7 % (ref 2–12)
NEUTROPHILS ABSOLUTE: 4.58 E9/L (ref 1.8–7.3)
NEUTROPHILS RELATIVE PERCENT: 65.3 % (ref 43–80)
PDW BLD-RTO: 13.1 FL (ref 11.5–15)
PLATELET # BLD: 184 E9/L (ref 130–450)
PMV BLD AUTO: 10.2 FL (ref 7–12)
POTASSIUM REFLEX MAGNESIUM: 3.3 MMOL/L (ref 3.5–5)
RBC # BLD: 3.53 E12/L (ref 3.5–5.5)
SODIUM BLD-SCNC: 139 MMOL/L (ref 132–146)
TOTAL PROTEIN: 5.3 G/DL (ref 6.4–8.3)
WBC # BLD: 7 E9/L (ref 4.5–11.5)

## 2018-09-01 PROCEDURE — G8978 MOBILITY CURRENT STATUS: HCPCS

## 2018-09-01 PROCEDURE — 99024 POSTOP FOLLOW-UP VISIT: CPT | Performed by: ORTHOPAEDIC SURGERY

## 2018-09-01 PROCEDURE — 80053 COMPREHEN METABOLIC PANEL: CPT

## 2018-09-01 PROCEDURE — 6370000000 HC RX 637 (ALT 250 FOR IP): Performed by: STUDENT IN AN ORGANIZED HEALTH CARE EDUCATION/TRAINING PROGRAM

## 2018-09-01 PROCEDURE — 99231 SBSQ HOSP IP/OBS SF/LOW 25: CPT | Performed by: SURGERY

## 2018-09-01 PROCEDURE — 85025 COMPLETE CBC W/AUTO DIFF WBC: CPT

## 2018-09-01 PROCEDURE — 36415 COLL VENOUS BLD VENIPUNCTURE: CPT

## 2018-09-01 PROCEDURE — 6360000002 HC RX W HCPCS: Performed by: STUDENT IN AN ORGANIZED HEALTH CARE EDUCATION/TRAINING PROGRAM

## 2018-09-01 PROCEDURE — 97530 THERAPEUTIC ACTIVITIES: CPT

## 2018-09-01 PROCEDURE — 83735 ASSAY OF MAGNESIUM: CPT

## 2018-09-01 PROCEDURE — 97167 OT EVAL HIGH COMPLEX 60 MIN: CPT

## 2018-09-01 PROCEDURE — G8987 SELF CARE CURRENT STATUS: HCPCS

## 2018-09-01 PROCEDURE — 6370000000 HC RX 637 (ALT 250 FOR IP): Performed by: SURGERY

## 2018-09-01 PROCEDURE — 2580000003 HC RX 258: Performed by: STUDENT IN AN ORGANIZED HEALTH CARE EDUCATION/TRAINING PROGRAM

## 2018-09-01 PROCEDURE — G8979 MOBILITY GOAL STATUS: HCPCS

## 2018-09-01 PROCEDURE — 2060000000 HC ICU INTERMEDIATE R&B

## 2018-09-01 PROCEDURE — 97162 PT EVAL MOD COMPLEX 30 MIN: CPT

## 2018-09-01 PROCEDURE — G8988 SELF CARE GOAL STATUS: HCPCS

## 2018-09-01 PROCEDURE — 97535 SELF CARE MNGMENT TRAINING: CPT

## 2018-09-01 PROCEDURE — 2700000000 HC OXYGEN THERAPY PER DAY

## 2018-09-01 RX ORDER — AMLODIPINE BESYLATE 10 MG/1
10 TABLET ORAL DAILY
Status: DISCONTINUED | OUTPATIENT
Start: 2018-09-01 | End: 2018-09-06 | Stop reason: HOSPADM

## 2018-09-01 RX ORDER — LEVOTHYROXINE SODIUM 0.03 MG/1
25 TABLET ORAL DAILY
Status: DISCONTINUED | OUTPATIENT
Start: 2018-09-01 | End: 2018-09-06 | Stop reason: HOSPADM

## 2018-09-01 RX ORDER — TRIAMTERENE AND HYDROCHLOROTHIAZIDE 37.5; 25 MG/1; MG/1
1 TABLET ORAL DAILY
Status: DISCONTINUED | OUTPATIENT
Start: 2018-09-01 | End: 2018-09-06 | Stop reason: HOSPADM

## 2018-09-01 RX ORDER — POTASSIUM CHLORIDE 20 MEQ/1
20 TABLET, EXTENDED RELEASE ORAL 2 TIMES DAILY WITH MEALS
Status: COMPLETED | OUTPATIENT
Start: 2018-09-01 | End: 2018-09-01

## 2018-09-01 RX ORDER — PANTOPRAZOLE SODIUM 40 MG/1
40 TABLET, DELAYED RELEASE ORAL DAILY
Status: DISCONTINUED | OUTPATIENT
Start: 2018-09-01 | End: 2018-09-06 | Stop reason: HOSPADM

## 2018-09-01 RX ORDER — HYOSCYAMINE SULFATE 0.125 MG
125 TABLET ORAL EVERY 4 HOURS PRN
Status: DISCONTINUED | OUTPATIENT
Start: 2018-09-01 | End: 2018-09-06 | Stop reason: HOSPADM

## 2018-09-01 RX ADMIN — LEVOTHYROXINE SODIUM 25 MCG: 25 TABLET ORAL at 11:12

## 2018-09-01 RX ADMIN — LEVETIRACETAM 500 MG: 500 TABLET, FILM COATED ORAL at 20:19

## 2018-09-01 RX ADMIN — MORPHINE SULFATE 2 MG: 2 INJECTION, SOLUTION INTRAMUSCULAR; INTRAVENOUS at 22:07

## 2018-09-01 RX ADMIN — AMLODIPINE BESYLATE 10 MG: 10 TABLET ORAL at 12:40

## 2018-09-01 RX ADMIN — DOCUSATE SODIUM 100 MG: 100 CAPSULE, LIQUID FILLED ORAL at 08:41

## 2018-09-01 RX ADMIN — POTASSIUM CHLORIDE 20 MEQ: 20 TABLET, EXTENDED RELEASE ORAL at 11:24

## 2018-09-01 RX ADMIN — OXYCODONE HYDROCHLORIDE 5 MG: 5 TABLET ORAL at 10:18

## 2018-09-01 RX ADMIN — MORPHINE SULFATE 2 MG: 2 INJECTION, SOLUTION INTRAMUSCULAR; INTRAVENOUS at 17:56

## 2018-09-01 RX ADMIN — OXYCODONE HYDROCHLORIDE 10 MG: 10 TABLET ORAL at 00:24

## 2018-09-01 RX ADMIN — TRIAMTERENE AND HYDROCHLOROTHIAZIDE 1 TABLET: 37.5; 25 TABLET ORAL at 12:40

## 2018-09-01 RX ADMIN — MORPHINE SULFATE 2 MG: 2 INJECTION, SOLUTION INTRAMUSCULAR; INTRAVENOUS at 06:43

## 2018-09-01 RX ADMIN — ONDANSETRON 4 MG: 2 SOLUTION INTRAMUSCULAR; INTRAVENOUS at 22:14

## 2018-09-01 RX ADMIN — OXYCODONE HYDROCHLORIDE 5 MG: 5 TABLET ORAL at 20:20

## 2018-09-01 RX ADMIN — POTASSIUM CHLORIDE 20 MEQ: 20 TABLET, EXTENDED RELEASE ORAL at 16:11

## 2018-09-01 RX ADMIN — OXYCODONE HYDROCHLORIDE 5 MG: 5 TABLET ORAL at 16:08

## 2018-09-01 RX ADMIN — LEVETIRACETAM 500 MG: 500 TABLET, FILM COATED ORAL at 08:41

## 2018-09-01 RX ADMIN — Medication 10 ML: at 06:44

## 2018-09-01 RX ADMIN — DOCUSATE SODIUM 100 MG: 100 CAPSULE, LIQUID FILLED ORAL at 20:19

## 2018-09-01 RX ADMIN — PANTOPRAZOLE SODIUM 40 MG: 40 TABLET, DELAYED RELEASE ORAL at 11:12

## 2018-09-01 ASSESSMENT — PAIN SCALES - GENERAL
PAINLEVEL_OUTOF10: 9
PAINLEVEL_OUTOF10: 10
PAINLEVEL_OUTOF10: 10
PAINLEVEL_OUTOF10: 8
PAINLEVEL_OUTOF10: 0
PAINLEVEL_OUTOF10: 9
PAINLEVEL_OUTOF10: 9

## 2018-09-01 ASSESSMENT — PAIN DESCRIPTION - PROGRESSION: CLINICAL_PROGRESSION: GRADUALLY IMPROVING

## 2018-09-01 ASSESSMENT — PAIN DESCRIPTION - ORIENTATION: ORIENTATION: RIGHT

## 2018-09-01 ASSESSMENT — PAIN DESCRIPTION - LOCATION: LOCATION: GENERALIZED

## 2018-09-01 ASSESSMENT — PAIN DESCRIPTION - PAIN TYPE
TYPE: ACUTE PAIN

## 2018-09-01 ASSESSMENT — PAIN DESCRIPTION - DESCRIPTORS
DESCRIPTORS: ACHING;CONSTANT;DISCOMFORT

## 2018-09-01 ASSESSMENT — PAIN DESCRIPTION - FREQUENCY
FREQUENCY: CONTINUOUS

## 2018-09-01 ASSESSMENT — PAIN DESCRIPTION - ONSET
ONSET: ON-GOING

## 2018-09-01 NOTE — ANESTHESIA POST-OP
Reviewed Surgical Residents evaluation and personally examined the patient. In addition, all diagnostics were reviewed. I agree with the Residents plan with the addition or modification as follows:     [facility] with SDH / SAH, right rib fxs     VS BP (!) 112/55   Pulse 70   Temp 98.8 °F (37.1 °C) (Oral)   Resp 16   Ht 5' 4\" (1.626 m)   Wt 174 lb (78.9 kg)   SpO2 96%   BMI 29.87 kg/m²   Awake and follows commands, purposeful movement in all extremities  Unlabored respirations and clear breath sounds  Regular cardiac rhythm with normal heart sounds  Flat, soft abdomen with bowel sounds.     Problem and Plan:  SDH, SAH, plan per NS. Observation and monitoring. Multiple right rib fx, pain control, incentive spirometer. Right shoulder dislocation. Ortho plan.
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
## 2018-09-01 NOTE — PROGRESS NOTES
8/30  · 4th metacarpal base fracture  · Mild right ankle sprain - xrays negative for fracture or dislocation    PLAN      · CT of right shoulder shows anatomic alignment at the glenohumeral joint. 5mm subchondral cyst anterior humeral head. No acute fractures. · Continue physical therapy and protocol: NWB - RUE  · Maintain splint and keep C/D/I  · Continue general diet  · No acute orthopedic intervention  · PT/OT  · Pain Control: per trauma  · Monitor H&H  · Discuss with Dr. Monae Henning    Staff Addendum    I have seen and examined the patient, as well as discussed with the resident. Changes were made to the above note where appropriate. Agree w/ above. Pt seen/examined. Resting in bed. Some serosanguinous drainage on dressing over elbow wound. NVID in fingers; shoulder exam limited due to pain    Imaging reviewed. Reduced shoulder with no evidence of bony abnormality on CT. Continue shoulder sling. Dressing change over elbow. Stable for DC from ortho standpoint. Follow up with Dr. Monae Henning in office next week, call for appt.     Barrett Buck MD  Orthopaedic Surgery   9/1/18  12:35 PM

## 2018-09-02 LAB
ALBUMIN SERPL-MCNC: 3.3 G/DL (ref 3.5–5.2)
ALP BLD-CCNC: 40 U/L (ref 35–104)
ALT SERPL-CCNC: 56 U/L (ref 0–32)
ANION GAP SERPL CALCULATED.3IONS-SCNC: 11 MMOL/L (ref 7–16)
AST SERPL-CCNC: 67 U/L (ref 0–31)
BASOPHILS ABSOLUTE: 0.04 E9/L (ref 0–0.2)
BASOPHILS RELATIVE PERCENT: 0.5 % (ref 0–2)
BILIRUB SERPL-MCNC: 0.7 MG/DL (ref 0–1.2)
BUN BLDV-MCNC: 11 MG/DL (ref 8–23)
CALCIUM SERPL-MCNC: 8.8 MG/DL (ref 8.6–10.2)
CHLORIDE BLD-SCNC: 104 MMOL/L (ref 98–107)
CO2: 24 MMOL/L (ref 22–29)
CREAT SERPL-MCNC: 0.8 MG/DL (ref 0.5–1)
EOSINOPHILS ABSOLUTE: 0.08 E9/L (ref 0.05–0.5)
EOSINOPHILS RELATIVE PERCENT: 1 % (ref 0–6)
GFR AFRICAN AMERICAN: >60
GFR NON-AFRICAN AMERICAN: >60 ML/MIN/1.73
GLUCOSE BLD-MCNC: 100 MG/DL (ref 74–109)
HCT VFR BLD CALC: 32.1 % (ref 34–48)
HEMOGLOBIN: 10.6 G/DL (ref 11.5–15.5)
IMMATURE GRANULOCYTES #: 0.03 E9/L
IMMATURE GRANULOCYTES %: 0.4 % (ref 0–5)
LYMPHOCYTES ABSOLUTE: 1.45 E9/L (ref 1.5–4)
LYMPHOCYTES RELATIVE PERCENT: 17.3 % (ref 20–42)
MCH RBC QN AUTO: 29.6 PG (ref 26–35)
MCHC RBC AUTO-ENTMCNC: 33 % (ref 32–34.5)
MCV RBC AUTO: 89.7 FL (ref 80–99.9)
MONOCYTES ABSOLUTE: 0.88 E9/L (ref 0.1–0.95)
MONOCYTES RELATIVE PERCENT: 10.5 % (ref 2–12)
NEUTROPHILS ABSOLUTE: 5.92 E9/L (ref 1.8–7.3)
NEUTROPHILS RELATIVE PERCENT: 70.3 % (ref 43–80)
PDW BLD-RTO: 12.5 FL (ref 11.5–15)
PLATELET # BLD: 193 E9/L (ref 130–450)
PMV BLD AUTO: 9.6 FL (ref 7–12)
POTASSIUM REFLEX MAGNESIUM: 3.8 MMOL/L (ref 3.5–5)
RBC # BLD: 3.58 E12/L (ref 3.5–5.5)
SODIUM BLD-SCNC: 139 MMOL/L (ref 132–146)
TOTAL PROTEIN: 6.1 G/DL (ref 6.4–8.3)
WBC # BLD: 8.4 E9/L (ref 4.5–11.5)

## 2018-09-02 PROCEDURE — 85025 COMPLETE CBC W/AUTO DIFF WBC: CPT

## 2018-09-02 PROCEDURE — 6360000002 HC RX W HCPCS: Performed by: STUDENT IN AN ORGANIZED HEALTH CARE EDUCATION/TRAINING PROGRAM

## 2018-09-02 PROCEDURE — 2700000000 HC OXYGEN THERAPY PER DAY

## 2018-09-02 PROCEDURE — 6370000000 HC RX 637 (ALT 250 FOR IP): Performed by: STUDENT IN AN ORGANIZED HEALTH CARE EDUCATION/TRAINING PROGRAM

## 2018-09-02 PROCEDURE — 6370000000 HC RX 637 (ALT 250 FOR IP): Performed by: SURGERY

## 2018-09-02 PROCEDURE — 2580000003 HC RX 258: Performed by: STUDENT IN AN ORGANIZED HEALTH CARE EDUCATION/TRAINING PROGRAM

## 2018-09-02 PROCEDURE — 2060000000 HC ICU INTERMEDIATE R&B

## 2018-09-02 PROCEDURE — 36415 COLL VENOUS BLD VENIPUNCTURE: CPT

## 2018-09-02 PROCEDURE — 99231 SBSQ HOSP IP/OBS SF/LOW 25: CPT | Performed by: SURGERY

## 2018-09-02 PROCEDURE — 80053 COMPREHEN METABOLIC PANEL: CPT

## 2018-09-02 RX ADMIN — AMLODIPINE BESYLATE 10 MG: 10 TABLET ORAL at 09:36

## 2018-09-02 RX ADMIN — OXYCODONE HYDROCHLORIDE 10 MG: 10 TABLET ORAL at 02:49

## 2018-09-02 RX ADMIN — MORPHINE SULFATE 2 MG: 2 INJECTION, SOLUTION INTRAMUSCULAR; INTRAVENOUS at 23:51

## 2018-09-02 RX ADMIN — LEVETIRACETAM 500 MG: 500 TABLET, FILM COATED ORAL at 21:16

## 2018-09-02 RX ADMIN — PANTOPRAZOLE SODIUM 40 MG: 40 TABLET, DELAYED RELEASE ORAL at 09:35

## 2018-09-02 RX ADMIN — ONDANSETRON 4 MG: 2 SOLUTION INTRAMUSCULAR; INTRAVENOUS at 16:35

## 2018-09-02 RX ADMIN — SODIUM CHLORIDE: 9 INJECTION, SOLUTION INTRAVENOUS at 02:49

## 2018-09-02 RX ADMIN — OXYCODONE HYDROCHLORIDE 10 MG: 10 TABLET ORAL at 17:16

## 2018-09-02 RX ADMIN — LEVOTHYROXINE SODIUM 25 MCG: 25 TABLET ORAL at 06:20

## 2018-09-02 RX ADMIN — MORPHINE SULFATE 2 MG: 2 INJECTION, SOLUTION INTRAMUSCULAR; INTRAVENOUS at 18:39

## 2018-09-02 RX ADMIN — SODIUM CHLORIDE: 9 INJECTION, SOLUTION INTRAVENOUS at 23:52

## 2018-09-02 RX ADMIN — OXYCODONE HYDROCHLORIDE 10 MG: 10 TABLET ORAL at 06:54

## 2018-09-02 RX ADMIN — DOCUSATE SODIUM 100 MG: 100 CAPSULE, LIQUID FILLED ORAL at 09:36

## 2018-09-02 RX ADMIN — TRIAMTERENE AND HYDROCHLOROTHIAZIDE 1 TABLET: 37.5; 25 TABLET ORAL at 09:36

## 2018-09-02 RX ADMIN — DOCUSATE SODIUM 100 MG: 100 CAPSULE, LIQUID FILLED ORAL at 21:16

## 2018-09-02 RX ADMIN — LEVETIRACETAM 500 MG: 500 TABLET, FILM COATED ORAL at 09:36

## 2018-09-02 RX ADMIN — MORPHINE SULFATE 2 MG: 2 INJECTION, SOLUTION INTRAMUSCULAR; INTRAVENOUS at 09:41

## 2018-09-02 RX ADMIN — OXYCODONE HYDROCHLORIDE 10 MG: 10 TABLET ORAL at 12:53

## 2018-09-02 RX ADMIN — ACETAMINOPHEN 650 MG: 325 TABLET, FILM COATED ORAL at 19:25

## 2018-09-02 ASSESSMENT — PAIN SCALES - GENERAL
PAINLEVEL_OUTOF10: 10
PAINLEVEL_OUTOF10: 9
PAINLEVEL_OUTOF10: 0
PAINLEVEL_OUTOF10: 8
PAINLEVEL_OUTOF10: 9

## 2018-09-02 ASSESSMENT — PAIN DESCRIPTION - ONSET: ONSET: ON-GOING

## 2018-09-02 ASSESSMENT — PAIN DESCRIPTION - PAIN TYPE: TYPE: ACUTE PAIN

## 2018-09-02 ASSESSMENT — PAIN DESCRIPTION - FREQUENCY: FREQUENCY: CONTINUOUS

## 2018-09-02 ASSESSMENT — PAIN DESCRIPTION - DESCRIPTORS: DESCRIPTORS: ACHING;CONSTANT;DISCOMFORT

## 2018-09-02 ASSESSMENT — PAIN DESCRIPTION - LOCATION: LOCATION: GENERALIZED

## 2018-09-02 ASSESSMENT — PAIN DESCRIPTION - ORIENTATION: ORIENTATION: RIGHT;LEFT

## 2018-09-03 LAB
ALBUMIN SERPL-MCNC: 3.3 G/DL (ref 3.5–5.2)
ALP BLD-CCNC: 39 U/L (ref 35–104)
ALT SERPL-CCNC: 43 U/L (ref 0–32)
ANION GAP SERPL CALCULATED.3IONS-SCNC: 10 MMOL/L (ref 7–16)
AST SERPL-CCNC: 40 U/L (ref 0–31)
BASOPHILS ABSOLUTE: 0.04 E9/L (ref 0–0.2)
BASOPHILS RELATIVE PERCENT: 0.5 % (ref 0–2)
BILIRUB SERPL-MCNC: 0.7 MG/DL (ref 0–1.2)
BUN BLDV-MCNC: 13 MG/DL (ref 8–23)
CALCIUM SERPL-MCNC: 8.7 MG/DL (ref 8.6–10.2)
CHLORIDE BLD-SCNC: 103 MMOL/L (ref 98–107)
CO2: 25 MMOL/L (ref 22–29)
CREAT SERPL-MCNC: 0.7 MG/DL (ref 0.5–1)
EOSINOPHILS ABSOLUTE: 0.06 E9/L (ref 0.05–0.5)
EOSINOPHILS RELATIVE PERCENT: 0.8 % (ref 0–6)
GFR AFRICAN AMERICAN: >60
GFR NON-AFRICAN AMERICAN: >60 ML/MIN/1.73
GLUCOSE BLD-MCNC: 99 MG/DL (ref 74–109)
HCT VFR BLD CALC: 30.1 % (ref 34–48)
HEMOGLOBIN: 10.2 G/DL (ref 11.5–15.5)
IMMATURE GRANULOCYTES #: 0.04 E9/L
IMMATURE GRANULOCYTES %: 0.5 % (ref 0–5)
LYMPHOCYTES ABSOLUTE: 1.53 E9/L (ref 1.5–4)
LYMPHOCYTES RELATIVE PERCENT: 20.6 % (ref 20–42)
MCH RBC QN AUTO: 30.5 PG (ref 26–35)
MCHC RBC AUTO-ENTMCNC: 33.9 % (ref 32–34.5)
MCV RBC AUTO: 90.1 FL (ref 80–99.9)
MONOCYTES ABSOLUTE: 0.74 E9/L (ref 0.1–0.95)
MONOCYTES RELATIVE PERCENT: 9.9 % (ref 2–12)
NEUTROPHILS ABSOLUTE: 5.03 E9/L (ref 1.8–7.3)
NEUTROPHILS RELATIVE PERCENT: 67.7 % (ref 43–80)
PDW BLD-RTO: 12.5 FL (ref 11.5–15)
PLATELET # BLD: 213 E9/L (ref 130–450)
PMV BLD AUTO: 9.9 FL (ref 7–12)
POTASSIUM REFLEX MAGNESIUM: 3.6 MMOL/L (ref 3.5–5)
RBC # BLD: 3.34 E12/L (ref 3.5–5.5)
SODIUM BLD-SCNC: 138 MMOL/L (ref 132–146)
TOTAL PROTEIN: 5.9 G/DL (ref 6.4–8.3)
WBC # BLD: 7.4 E9/L (ref 4.5–11.5)

## 2018-09-03 PROCEDURE — 97530 THERAPEUTIC ACTIVITIES: CPT

## 2018-09-03 PROCEDURE — 99024 POSTOP FOLLOW-UP VISIT: CPT | Performed by: ORTHOPAEDIC SURGERY

## 2018-09-03 PROCEDURE — 2580000003 HC RX 258: Performed by: STUDENT IN AN ORGANIZED HEALTH CARE EDUCATION/TRAINING PROGRAM

## 2018-09-03 PROCEDURE — 6370000000 HC RX 637 (ALT 250 FOR IP): Performed by: STUDENT IN AN ORGANIZED HEALTH CARE EDUCATION/TRAINING PROGRAM

## 2018-09-03 PROCEDURE — 6360000002 HC RX W HCPCS: Performed by: STUDENT IN AN ORGANIZED HEALTH CARE EDUCATION/TRAINING PROGRAM

## 2018-09-03 PROCEDURE — 85025 COMPLETE CBC W/AUTO DIFF WBC: CPT

## 2018-09-03 PROCEDURE — 6370000000 HC RX 637 (ALT 250 FOR IP): Performed by: SURGERY

## 2018-09-03 PROCEDURE — 99231 SBSQ HOSP IP/OBS SF/LOW 25: CPT | Performed by: SURGERY

## 2018-09-03 PROCEDURE — 80053 COMPREHEN METABOLIC PANEL: CPT

## 2018-09-03 PROCEDURE — 2700000000 HC OXYGEN THERAPY PER DAY

## 2018-09-03 PROCEDURE — 2060000000 HC ICU INTERMEDIATE R&B

## 2018-09-03 PROCEDURE — 36415 COLL VENOUS BLD VENIPUNCTURE: CPT

## 2018-09-03 RX ADMIN — OXYCODONE HYDROCHLORIDE 10 MG: 10 TABLET ORAL at 19:40

## 2018-09-03 RX ADMIN — AMLODIPINE BESYLATE 10 MG: 10 TABLET ORAL at 09:00

## 2018-09-03 RX ADMIN — LEVETIRACETAM 500 MG: 500 TABLET, FILM COATED ORAL at 20:42

## 2018-09-03 RX ADMIN — OXYCODONE HYDROCHLORIDE 10 MG: 10 TABLET ORAL at 15:24

## 2018-09-03 RX ADMIN — LEVETIRACETAM 500 MG: 500 TABLET, FILM COATED ORAL at 09:00

## 2018-09-03 RX ADMIN — MORPHINE SULFATE 2 MG: 2 INJECTION, SOLUTION INTRAMUSCULAR; INTRAVENOUS at 07:55

## 2018-09-03 RX ADMIN — ONDANSETRON 4 MG: 2 SOLUTION INTRAMUSCULAR; INTRAVENOUS at 07:55

## 2018-09-03 RX ADMIN — ACETAMINOPHEN 650 MG: 325 TABLET, FILM COATED ORAL at 04:27

## 2018-09-03 RX ADMIN — ACETAMINOPHEN 650 MG: 325 TABLET, FILM COATED ORAL at 22:21

## 2018-09-03 RX ADMIN — SODIUM CHLORIDE: 9 INJECTION, SOLUTION INTRAVENOUS at 11:23

## 2018-09-03 RX ADMIN — ONDANSETRON 4 MG: 2 SOLUTION INTRAMUSCULAR; INTRAVENOUS at 15:24

## 2018-09-03 RX ADMIN — TRIAMTERENE AND HYDROCHLOROTHIAZIDE 1 TABLET: 37.5; 25 TABLET ORAL at 09:00

## 2018-09-03 RX ADMIN — PANTOPRAZOLE SODIUM 40 MG: 40 TABLET, DELAYED RELEASE ORAL at 09:00

## 2018-09-03 RX ADMIN — LEVOTHYROXINE SODIUM 25 MCG: 25 TABLET ORAL at 06:37

## 2018-09-03 RX ADMIN — DOCUSATE SODIUM 100 MG: 100 CAPSULE, LIQUID FILLED ORAL at 20:42

## 2018-09-03 RX ADMIN — OXYCODONE HYDROCHLORIDE 10 MG: 10 TABLET ORAL at 11:22

## 2018-09-03 RX ADMIN — DOCUSATE SODIUM 100 MG: 100 CAPSULE, LIQUID FILLED ORAL at 09:00

## 2018-09-03 ASSESSMENT — PAIN SCALES - GENERAL
PAINLEVEL_OUTOF10: 4
PAINLEVEL_OUTOF10: 10
PAINLEVEL_OUTOF10: 10
PAINLEVEL_OUTOF10: 0
PAINLEVEL_OUTOF10: 9
PAINLEVEL_OUTOF10: 10
PAINLEVEL_OUTOF10: 9
PAINLEVEL_OUTOF10: 6
PAINLEVEL_OUTOF10: 9
PAINLEVEL_OUTOF10: 6

## 2018-09-03 ASSESSMENT — PAIN DESCRIPTION - ORIENTATION
ORIENTATION: RIGHT

## 2018-09-03 ASSESSMENT — PAIN DESCRIPTION - PROGRESSION
CLINICAL_PROGRESSION: GRADUALLY IMPROVING
CLINICAL_PROGRESSION: GRADUALLY WORSENING

## 2018-09-03 ASSESSMENT — PAIN DESCRIPTION - LOCATION
LOCATION: HEAD
LOCATION: ARM
LOCATION: ARM
LOCATION: SHOULDER;RIB CAGE
LOCATION: HEAD

## 2018-09-03 ASSESSMENT — PAIN DESCRIPTION - PAIN TYPE
TYPE: ACUTE PAIN

## 2018-09-03 ASSESSMENT — PAIN DESCRIPTION - DESCRIPTORS
DESCRIPTORS: ACHING;DISCOMFORT;CONSTANT
DESCRIPTORS: ACHING;HEADACHE

## 2018-09-03 ASSESSMENT — PAIN DESCRIPTION - FREQUENCY
FREQUENCY: CONTINUOUS
FREQUENCY: INTERMITTENT

## 2018-09-03 ASSESSMENT — PAIN DESCRIPTION - ONSET
ONSET: GRADUAL
ONSET: ON-GOING
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
## 2018-09-04 LAB
ALBUMIN SERPL-MCNC: 3.4 G/DL (ref 3.5–5.2)
ALP BLD-CCNC: 47 U/L (ref 35–104)
ALT SERPL-CCNC: 44 U/L (ref 0–32)
ANION GAP SERPL CALCULATED.3IONS-SCNC: 13 MMOL/L (ref 7–16)
AST SERPL-CCNC: 32 U/L (ref 0–31)
BASOPHILS ABSOLUTE: 0.04 E9/L (ref 0–0.2)
BASOPHILS RELATIVE PERCENT: 0.5 % (ref 0–2)
BILIRUB SERPL-MCNC: 1.3 MG/DL (ref 0–1.2)
BUN BLDV-MCNC: 12 MG/DL (ref 8–23)
CALCIUM SERPL-MCNC: 8.9 MG/DL (ref 8.6–10.2)
CHLORIDE BLD-SCNC: 99 MMOL/L (ref 98–107)
CO2: 26 MMOL/L (ref 22–29)
CREAT SERPL-MCNC: 0.7 MG/DL (ref 0.5–1)
EOSINOPHILS ABSOLUTE: 0.02 E9/L (ref 0.05–0.5)
EOSINOPHILS RELATIVE PERCENT: 0.2 % (ref 0–6)
GFR AFRICAN AMERICAN: >60
GFR NON-AFRICAN AMERICAN: >60 ML/MIN/1.73
GLUCOSE BLD-MCNC: 103 MG/DL (ref 74–109)
HCT VFR BLD CALC: 29.3 % (ref 34–48)
HEMOGLOBIN: 10.1 G/DL (ref 11.5–15.5)
IMMATURE GRANULOCYTES #: 0.04 E9/L
IMMATURE GRANULOCYTES %: 0.5 % (ref 0–5)
LYMPHOCYTES ABSOLUTE: 1.91 E9/L (ref 1.5–4)
LYMPHOCYTES RELATIVE PERCENT: 23.6 % (ref 20–42)
MAGNESIUM: 2.3 MG/DL (ref 1.6–2.6)
MCH RBC QN AUTO: 30.1 PG (ref 26–35)
MCHC RBC AUTO-ENTMCNC: 34.5 % (ref 32–34.5)
MCV RBC AUTO: 87.5 FL (ref 80–99.9)
MONOCYTES ABSOLUTE: 0.95 E9/L (ref 0.1–0.95)
MONOCYTES RELATIVE PERCENT: 11.8 % (ref 2–12)
NEUTROPHILS ABSOLUTE: 5.12 E9/L (ref 1.8–7.3)
NEUTROPHILS RELATIVE PERCENT: 63.4 % (ref 43–80)
PDW BLD-RTO: 12.6 FL (ref 11.5–15)
PLATELET # BLD: 277 E9/L (ref 130–450)
PMV BLD AUTO: 9.5 FL (ref 7–12)
POTASSIUM REFLEX MAGNESIUM: 3.3 MMOL/L (ref 3.5–5)
RBC # BLD: 3.35 E12/L (ref 3.5–5.5)
SODIUM BLD-SCNC: 138 MMOL/L (ref 132–146)
TOTAL PROTEIN: 6.1 G/DL (ref 6.4–8.3)
WBC # BLD: 8.1 E9/L (ref 4.5–11.5)

## 2018-09-04 PROCEDURE — 6370000000 HC RX 637 (ALT 250 FOR IP): Performed by: PHYSICIAN ASSISTANT

## 2018-09-04 PROCEDURE — 6360000002 HC RX W HCPCS: Performed by: STUDENT IN AN ORGANIZED HEALTH CARE EDUCATION/TRAINING PROGRAM

## 2018-09-04 PROCEDURE — 80053 COMPREHEN METABOLIC PANEL: CPT

## 2018-09-04 PROCEDURE — 2060000000 HC ICU INTERMEDIATE R&B

## 2018-09-04 PROCEDURE — 99232 SBSQ HOSP IP/OBS MODERATE 35: CPT | Performed by: SURGERY

## 2018-09-04 PROCEDURE — 6370000000 HC RX 637 (ALT 250 FOR IP): Performed by: STUDENT IN AN ORGANIZED HEALTH CARE EDUCATION/TRAINING PROGRAM

## 2018-09-04 PROCEDURE — 2700000000 HC OXYGEN THERAPY PER DAY

## 2018-09-04 PROCEDURE — 97530 THERAPEUTIC ACTIVITIES: CPT

## 2018-09-04 PROCEDURE — 83735 ASSAY OF MAGNESIUM: CPT

## 2018-09-04 PROCEDURE — 6370000000 HC RX 637 (ALT 250 FOR IP): Performed by: SURGERY

## 2018-09-04 PROCEDURE — 99024 POSTOP FOLLOW-UP VISIT: CPT | Performed by: ORTHOPAEDIC SURGERY

## 2018-09-04 PROCEDURE — 85025 COMPLETE CBC W/AUTO DIFF WBC: CPT

## 2018-09-04 PROCEDURE — 2580000003 HC RX 258: Performed by: STUDENT IN AN ORGANIZED HEALTH CARE EDUCATION/TRAINING PROGRAM

## 2018-09-04 PROCEDURE — 36415 COLL VENOUS BLD VENIPUNCTURE: CPT

## 2018-09-04 RX ORDER — BUTALBITAL, ACETAMINOPHEN AND CAFFEINE 50; 325; 40 MG/1; MG/1; MG/1
2 TABLET ORAL EVERY 4 HOURS PRN
Status: DISCONTINUED | OUTPATIENT
Start: 2018-09-04 | End: 2018-09-06 | Stop reason: HOSPADM

## 2018-09-04 RX ORDER — POTASSIUM CHLORIDE 20 MEQ/1
40 TABLET, EXTENDED RELEASE ORAL ONCE
Status: COMPLETED | OUTPATIENT
Start: 2018-09-04 | End: 2018-09-04

## 2018-09-04 RX ORDER — SENNA PLUS 8.6 MG/1
1 TABLET ORAL NIGHTLY
Status: DISCONTINUED | OUTPATIENT
Start: 2018-09-04 | End: 2018-09-06 | Stop reason: HOSPADM

## 2018-09-04 RX ORDER — BACITRACIN, NEOMYCIN, POLYMYXIN B 400; 3.5; 5 [USP'U]/G; MG/G; [USP'U]/G
OINTMENT TOPICAL 2 TIMES DAILY
Status: DISCONTINUED | OUTPATIENT
Start: 2018-09-04 | End: 2018-09-06 | Stop reason: HOSPADM

## 2018-09-04 RX ADMIN — BACITRACIN, NEOMYCIN, POLYMYXIN B 1 G: 400; 3.5; 5 OINTMENT TOPICAL at 22:32

## 2018-09-04 RX ADMIN — PANTOPRAZOLE SODIUM 40 MG: 40 TABLET, DELAYED RELEASE ORAL at 08:50

## 2018-09-04 RX ADMIN — DOCUSATE SODIUM 100 MG: 100 CAPSULE, LIQUID FILLED ORAL at 08:50

## 2018-09-04 RX ADMIN — ENOXAPARIN SODIUM 30 MG: 30 INJECTION SUBCUTANEOUS at 08:50

## 2018-09-04 RX ADMIN — SODIUM CHLORIDE: 9 INJECTION, SOLUTION INTRAVENOUS at 03:01

## 2018-09-04 RX ADMIN — OXYCODONE HYDROCHLORIDE 10 MG: 10 TABLET ORAL at 05:01

## 2018-09-04 RX ADMIN — LEVETIRACETAM 500 MG: 500 TABLET, FILM COATED ORAL at 22:30

## 2018-09-04 RX ADMIN — BUTALBITAL, ACETAMINOPHEN AND CAFFEINE 2 TABLET: 50; 325; 40 TABLET ORAL at 09:28

## 2018-09-04 RX ADMIN — OXYCODONE HYDROCHLORIDE 5 MG: 5 TABLET ORAL at 19:17

## 2018-09-04 RX ADMIN — BACITRACIN, NEOMYCIN, POLYMYXIN B: 400; 3.5; 5 OINTMENT TOPICAL at 13:59

## 2018-09-04 RX ADMIN — BUTALBITAL, ACETAMINOPHEN AND CAFFEINE 2 TABLET: 50; 325; 40 TABLET ORAL at 16:06

## 2018-09-04 RX ADMIN — SENNOSIDES 8.6 MG: 8.6 TABLET, FILM COATED ORAL at 22:32

## 2018-09-04 RX ADMIN — TRIAMTERENE AND HYDROCHLOROTHIAZIDE 1 TABLET: 37.5; 25 TABLET ORAL at 08:51

## 2018-09-04 RX ADMIN — LEVOTHYROXINE SODIUM 25 MCG: 25 TABLET ORAL at 06:17

## 2018-09-04 RX ADMIN — ENOXAPARIN SODIUM 30 MG: 30 INJECTION SUBCUTANEOUS at 22:30

## 2018-09-04 RX ADMIN — Medication 10 ML: at 17:42

## 2018-09-04 RX ADMIN — POTASSIUM CHLORIDE 40 MEQ: 20 TABLET, EXTENDED RELEASE ORAL at 17:50

## 2018-09-04 RX ADMIN — AMLODIPINE BESYLATE 10 MG: 10 TABLET ORAL at 08:51

## 2018-09-04 RX ADMIN — DOCUSATE SODIUM 100 MG: 100 CAPSULE, LIQUID FILLED ORAL at 22:30

## 2018-09-04 RX ADMIN — Medication 10 ML: at 22:32

## 2018-09-04 RX ADMIN — OXYCODONE HYDROCHLORIDE 5 MG: 5 TABLET ORAL at 13:37

## 2018-09-04 RX ADMIN — BUTALBITAL, ACETAMINOPHEN AND CAFFEINE 2 TABLET: 50; 325; 40 TABLET ORAL at 22:28

## 2018-09-04 RX ADMIN — LEVETIRACETAM 500 MG: 500 TABLET, FILM COATED ORAL at 08:51

## 2018-09-04 ASSESSMENT — PAIN SCALES - GENERAL
PAINLEVEL_OUTOF10: 9
PAINLEVEL_OUTOF10: 8
PAINLEVEL_OUTOF10: 0
PAINLEVEL_OUTOF10: 10
PAINLEVEL_OUTOF10: 9
PAINLEVEL_OUTOF10: 8
PAINLEVEL_OUTOF10: 9
PAINLEVEL_OUTOF10: 9
PAINLEVEL_OUTOF10: 8
PAINLEVEL_OUTOF10: 9

## 2018-09-04 ASSESSMENT — PAIN DESCRIPTION - LOCATION
LOCATION: HEAD
LOCATION: BACK;HEAD
LOCATION: HEAD
LOCATION: HEAD

## 2018-09-04 ASSESSMENT — PAIN DESCRIPTION - ORIENTATION
ORIENTATION: OTHER (COMMENT)
ORIENTATION: RIGHT

## 2018-09-04 ASSESSMENT — PAIN DESCRIPTION - DESCRIPTORS
DESCRIPTORS: ACHING;DISCOMFORT;HEADACHE

## 2018-09-04 ASSESSMENT — PAIN DESCRIPTION - PAIN TYPE: TYPE: ACUTE PAIN
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
## 2018-09-04 NOTE — PROGRESS NOTES
IV team notified of need for new line.  Electronically signed by Madelaine Pitt RN on 9/4/2018 at 12:38 PM
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
## 2018-09-04 NOTE — PROGRESS NOTES
*      Plan:     Neuro: SAH/SDH- stable on CT scan, Monitor neuro status, Neurosurgery following, Keppra   CV: No acute issues, monitor hemodynamics  Pulm: No acute issues Monitor RR & SpO2. Encourage cough, SMI & deep breathing. GI: Gen diet, increase bowel regimen  Renal: No acute issues. ID: No acute issues    Endocrine: No acute issues. MSK: R 4th MCP fx dislocation, R post. Shoulder dislocation s/p reduction   Turn & reposition. PT/OT when able - [facility] 12/24  Monitor for skin breakdown. Heme: No acute issues.       Bowel regime: Ducolax suppository PRN. Colace, senna  Pain control/Sedation:   Joanne, Morphine Tylenol. DVT prophylaxis: SCDs. No Lovenox/heparin due to SDH . GI: General  Glucose protocol:  ISS  Mouth/Eye care: As needed. Code status:   Full Code     Patient/Family update:  As available      Disposition: Continue monitor floor.       Park Florez MD  9/4/2018  7:32 AM

## 2018-09-05 ENCOUNTER — APPOINTMENT (OUTPATIENT)
Dept: CT IMAGING | Age: 68
DRG: 086 | End: 2018-09-05
Payer: MEDICARE

## 2018-09-05 LAB
6AM URINE: <10 NG/ML
ALBUMIN SERPL-MCNC: 3.7 G/DL (ref 3.5–5.2)
ALP BLD-CCNC: 57 U/L (ref 35–104)
ALT SERPL-CCNC: 50 U/L (ref 0–32)
ANION GAP SERPL CALCULATED.3IONS-SCNC: 11 MMOL/L (ref 7–16)
AST SERPL-CCNC: 36 U/L (ref 0–31)
BASOPHILS ABSOLUTE: 0.05 E9/L (ref 0–0.2)
BASOPHILS RELATIVE PERCENT: 0.7 % (ref 0–2)
BILIRUB SERPL-MCNC: 1.6 MG/DL (ref 0–1.2)
BUN BLDV-MCNC: 12 MG/DL (ref 8–23)
CALCIUM SERPL-MCNC: 9.4 MG/DL (ref 8.6–10.2)
CHLORIDE BLD-SCNC: 102 MMOL/L (ref 98–107)
CO2: 27 MMOL/L (ref 22–29)
CODEINE, URINE: <20 NG/ML
CREAT SERPL-MCNC: 0.9 MG/DL (ref 0.5–1)
EOSINOPHILS ABSOLUTE: 0.08 E9/L (ref 0.05–0.5)
EOSINOPHILS RELATIVE PERCENT: 1.1 % (ref 0–6)
GFR AFRICAN AMERICAN: >60
GFR NON-AFRICAN AMERICAN: >60 ML/MIN/1.73
GLUCOSE BLD-MCNC: 96 MG/DL (ref 74–109)
HCT VFR BLD CALC: 32.3 % (ref 34–48)
HEMOGLOBIN: 10.7 G/DL (ref 11.5–15.5)
HYDROCODONE, URINE: <20 NG/ML
HYDROMORPHONE, URINE: <20 NG/ML
IMMATURE GRANULOCYTES #: 0.02 E9/L
IMMATURE GRANULOCYTES %: 0.3 % (ref 0–5)
LYMPHOCYTES ABSOLUTE: 2.14 E9/L (ref 1.5–4)
LYMPHOCYTES RELATIVE PERCENT: 29.1 % (ref 20–42)
MAGNESIUM: 2.4 MG/DL (ref 1.6–2.6)
MCH RBC QN AUTO: 29.3 PG (ref 26–35)
MCHC RBC AUTO-ENTMCNC: 33.1 % (ref 32–34.5)
MCV RBC AUTO: 88.5 FL (ref 80–99.9)
MONOCYTES ABSOLUTE: 0.91 E9/L (ref 0.1–0.95)
MONOCYTES RELATIVE PERCENT: 12.4 % (ref 2–12)
MORPHINE URINE: 211 NG/ML
NEUTROPHILS ABSOLUTE: 4.15 E9/L (ref 1.8–7.3)
NEUTROPHILS RELATIVE PERCENT: 56.4 % (ref 43–80)
NORHYDROCODONE, URINE: <20 NG/ML
NOROXYCODONE, URINE: <20 NG/ML
NOROXYMORPHONE, URINE: <20 NG/ML
OXYCODONE, URINE CONFIRMATION: <20 NG/ML
OXYMORPHONE, URINE: <20 NG/ML
PDW BLD-RTO: 12.8 FL (ref 11.5–15)
PLATELET # BLD: 292 E9/L (ref 130–450)
PMV BLD AUTO: 9.1 FL (ref 7–12)
POTASSIUM REFLEX MAGNESIUM: 3.4 MMOL/L (ref 3.5–5)
RBC # BLD: 3.65 E12/L (ref 3.5–5.5)
SODIUM BLD-SCNC: 140 MMOL/L (ref 132–146)
TOTAL PROTEIN: 6.6 G/DL (ref 6.4–8.3)
WBC # BLD: 7.4 E9/L (ref 4.5–11.5)

## 2018-09-05 PROCEDURE — 97530 THERAPEUTIC ACTIVITIES: CPT

## 2018-09-05 PROCEDURE — 6370000000 HC RX 637 (ALT 250 FOR IP): Performed by: STUDENT IN AN ORGANIZED HEALTH CARE EDUCATION/TRAINING PROGRAM

## 2018-09-05 PROCEDURE — 70450 CT HEAD/BRAIN W/O DYE: CPT

## 2018-09-05 PROCEDURE — 99024 POSTOP FOLLOW-UP VISIT: CPT | Performed by: ORTHOPAEDIC SURGERY

## 2018-09-05 PROCEDURE — 2060000000 HC ICU INTERMEDIATE R&B

## 2018-09-05 PROCEDURE — 99232 SBSQ HOSP IP/OBS MODERATE 35: CPT | Performed by: SURGERY

## 2018-09-05 PROCEDURE — 6360000002 HC RX W HCPCS: Performed by: STUDENT IN AN ORGANIZED HEALTH CARE EDUCATION/TRAINING PROGRAM

## 2018-09-05 PROCEDURE — 6370000000 HC RX 637 (ALT 250 FOR IP): Performed by: PHYSICIAN ASSISTANT

## 2018-09-05 PROCEDURE — 85025 COMPLETE CBC W/AUTO DIFF WBC: CPT

## 2018-09-05 PROCEDURE — 97112 NEUROMUSCULAR REEDUCATION: CPT

## 2018-09-05 PROCEDURE — 2580000003 HC RX 258: Performed by: STUDENT IN AN ORGANIZED HEALTH CARE EDUCATION/TRAINING PROGRAM

## 2018-09-05 PROCEDURE — 97535 SELF CARE MNGMENT TRAINING: CPT

## 2018-09-05 PROCEDURE — 6370000000 HC RX 637 (ALT 250 FOR IP): Performed by: SURGERY

## 2018-09-05 PROCEDURE — 36415 COLL VENOUS BLD VENIPUNCTURE: CPT

## 2018-09-05 PROCEDURE — 83735 ASSAY OF MAGNESIUM: CPT

## 2018-09-05 PROCEDURE — 80053 COMPREHEN METABOLIC PANEL: CPT

## 2018-09-05 RX ORDER — POLYETHYLENE GLYCOL 3350 17 G/17G
17 POWDER, FOR SOLUTION ORAL 2 TIMES DAILY
Status: DISCONTINUED | OUTPATIENT
Start: 2018-09-05 | End: 2018-09-06 | Stop reason: HOSPADM

## 2018-09-05 RX ADMIN — BUTALBITAL, ACETAMINOPHEN AND CAFFEINE 2 TABLET: 50; 325; 40 TABLET ORAL at 15:07

## 2018-09-05 RX ADMIN — LEVETIRACETAM 500 MG: 500 TABLET, FILM COATED ORAL at 08:04

## 2018-09-05 RX ADMIN — DOCUSATE SODIUM 100 MG: 100 CAPSULE, LIQUID FILLED ORAL at 08:04

## 2018-09-05 RX ADMIN — BACITRACIN, NEOMYCIN, POLYMYXIN B 1 G: 400; 3.5; 5 OINTMENT TOPICAL at 21:46

## 2018-09-05 RX ADMIN — SENNOSIDES 8.6 MG: 8.6 TABLET, FILM COATED ORAL at 21:46

## 2018-09-05 RX ADMIN — LEVETIRACETAM 500 MG: 500 TABLET, FILM COATED ORAL at 21:46

## 2018-09-05 RX ADMIN — POLYETHYLENE GLYCOL 3350 17 G: 17 POWDER, FOR SOLUTION ORAL at 08:03

## 2018-09-05 RX ADMIN — ACETAMINOPHEN 650 MG: 325 TABLET, FILM COATED ORAL at 08:04

## 2018-09-05 RX ADMIN — ENOXAPARIN SODIUM 30 MG: 30 INJECTION SUBCUTANEOUS at 08:04

## 2018-09-05 RX ADMIN — OXYCODONE HYDROCHLORIDE 10 MG: 10 TABLET ORAL at 07:10

## 2018-09-05 RX ADMIN — BUTALBITAL, ACETAMINOPHEN AND CAFFEINE 2 TABLET: 50; 325; 40 TABLET ORAL at 20:12

## 2018-09-05 RX ADMIN — OXYCODONE HYDROCHLORIDE 10 MG: 10 TABLET ORAL at 17:47

## 2018-09-05 RX ADMIN — DOCUSATE SODIUM 100 MG: 100 CAPSULE, LIQUID FILLED ORAL at 21:46

## 2018-09-05 RX ADMIN — TRIAMTERENE AND HYDROCHLOROTHIAZIDE 1 TABLET: 37.5; 25 TABLET ORAL at 08:04

## 2018-09-05 RX ADMIN — ENOXAPARIN SODIUM 30 MG: 30 INJECTION SUBCUTANEOUS at 21:46

## 2018-09-05 RX ADMIN — PANTOPRAZOLE SODIUM 40 MG: 40 TABLET, DELAYED RELEASE ORAL at 08:04

## 2018-09-05 RX ADMIN — LEVOTHYROXINE SODIUM 25 MCG: 25 TABLET ORAL at 06:09

## 2018-09-05 RX ADMIN — Medication 10 ML: at 21:47

## 2018-09-05 RX ADMIN — Medication 10 ML: at 08:05

## 2018-09-05 RX ADMIN — AMLODIPINE BESYLATE 10 MG: 10 TABLET ORAL at 08:04

## 2018-09-05 RX ADMIN — BACITRACIN, NEOMYCIN, POLYMYXIN B 1 G: 400; 3.5; 5 OINTMENT TOPICAL at 08:05

## 2018-09-05 RX ADMIN — OXYCODONE HYDROCHLORIDE 5 MG: 5 TABLET ORAL at 12:27

## 2018-09-05 ASSESSMENT — PAIN SCALES - GENERAL
PAINLEVEL_OUTOF10: 8
PAINLEVEL_OUTOF10: 0
PAINLEVEL_OUTOF10: 0
PAINLEVEL_OUTOF10: 9
PAINLEVEL_OUTOF10: 9
PAINLEVEL_OUTOF10: 0
PAINLEVEL_OUTOF10: 9
PAINLEVEL_OUTOF10: 0
PAINLEVEL_OUTOF10: 0
PAINLEVEL_OUTOF10: 9
PAINLEVEL_OUTOF10: 3
PAINLEVEL_OUTOF10: 0
PAINLEVEL_OUTOF10: 5

## 2018-09-05 ASSESSMENT — PAIN DESCRIPTION - LOCATION
LOCATION: HEAD

## 2018-09-05 ASSESSMENT — PAIN DESCRIPTION - FREQUENCY
FREQUENCY: CONTINUOUS

## 2018-09-05 ASSESSMENT — PAIN DESCRIPTION - PAIN TYPE
TYPE: ACUTE PAIN

## 2018-09-05 ASSESSMENT — PAIN DESCRIPTION - PROGRESSION: CLINICAL_PROGRESSION: GRADUALLY IMPROVING

## 2018-09-05 ASSESSMENT — PAIN DESCRIPTION - DESCRIPTORS
DESCRIPTORS: ACHING;CONSTANT;DISCOMFORT;HEADACHE
DESCRIPTORS: ACHING
DESCRIPTORS: ACHING;CONSTANT;DISCOMFORT;HEADACHE

## 2018-09-05 ASSESSMENT — PAIN DESCRIPTION - ONSET: ONSET: ON-GOING

## 2018-09-05 NOTE — CARE COORDINATION
SOCIAL WORK / DISCHARGE PLANNING:  Follow up to [facility] referrals, [facility] does not have bed available at this time. [facility] has accepted and will start precert. CT head to be completed due to complaints of worsening headaches. N17 initiated. Addendum: Sw did follow up with spouse and sister at bedside, pt out of room for testing. Agreeable to [facility] and expect dc soon. Envelope and ambulette forms completed, in soft chart. HENS done.                  Electronically signed by DEBRA Escamilla on 9/5/2018 at 10:31 AM
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
## 2018-09-05 NOTE — PROGRESS NOTES
drink from cup with straw at bed level/seated EOB  N/T;    Pts sister assists her with meals with increased time. Grooming  Max A bed level Min A;    Pt able to wash face and brush teeth with Min A and Min verbal prompting to increase initiation.      Upper Body Dressing Max A  To adjust gown around body/thread through UEs   Max A;    Max A with Mod verbal prompting to laura/doff gown and sling with increased time. Lower Body Dressing DEP Dep; To laura/doff socks and pants while sitting up on the EOB. Bathing DEP  N/T     Toileting  DEP  Mod A;    Pt able to transfer on/off BSC with Min A and use of SBQC. Mod A to complete hygiene while in standing to maximize safety. Bed Mobility  Supine to Sit: Max Ax2  Sit to Supine:DEPx2  Max cues for sequencing and safe technique for pain mgmt, increased time required   Sup > Sit: SBA;    Min verbal prompting to increase proper positioning while transferring from supine to sitting position with HOB elevated. Pt able to sit at the EOB for 5 minutes with sup and good balance. Functional Transfers NT; pt refusing this date   Sit <> stand: Min A;    Min A and Min verbal prompting for proper hand placement to transfer from sit to standing position with use of SBQC. Functional Mobility NT  Mobility: Min A;    Min A with Min verbal prompting to increase balance and proper body alignment with SBQC to decrease risk of falling.        Sit balance: Sup;Good  Stand balance: Min A; Fair  Endurance/Activity tolerance: Fair pt shows good participation however requires increased time to complete simple tasks due to level of pain. Comments: Upon arrival pt in supine with sister in room. At end of session pt left up in the chair with RUE elevated, tray table in front of her, all lines and tubes intact, call light within reach and sister in room. · Pt has made fair progress towards set goals.    · Continue with current plan of care    Time in: 2:10  Time out: 2:40  Total Tx Time: 30 mins    Hannah Fail AKBAR/L [id number]
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
## 2018-09-05 NOTE — PROGRESS NOTES
Department of Neurosurgery  Physician Assistant Progress Note    CHIEF COMPLAINT: Patient seen for SDH/SAH    SUBJECTIVE:  C/o worsening headaches and izziness. No new extremity pain, weakness, n/t aside from RUE in sling.      ROS:  Denies chest pain  Denies fevers    OBJECTIVE  Physical  VITALS:  /78   Pulse 52   Temp 99.2 °F (37.3 °C) (Temporal)   Resp 20   Ht 5' 4\" (1.626 m)   Wt 174 lb (78.9 kg)   SpO2 96%   BMI 29.87 kg/m²    Awake and alert   No apparent distress   Non-labored breathing    FC x 4 ext, limited in RUE   Pupils equal    Data  CBC with Differential:    Lab Results   Component Value Date    WBC 7.4 09/05/2018    RBC 3.65 09/05/2018    HGB 10.7 09/05/2018    HCT 32.3 09/05/2018     09/05/2018    MCV 88.5 09/05/2018    MCH 29.3 09/05/2018    MCHC 33.1 09/05/2018    RDW 12.8 09/05/2018    LYMPHOPCT 29.1 09/05/2018    MONOPCT 12.4 09/05/2018    BASOPCT 0.7 09/05/2018    MONOSABS 0.91 09/05/2018    LYMPHSABS 2.14 09/05/2018    EOSABS 0.08 09/05/2018    BASOSABS 0.05 09/05/2018     Current Inpatient Medications  Current Facility-Administered Medications: polyethylene glycol (GLYCOLAX) packet 17 g, 17 g, Oral, BID  enoxaparin (LOVENOX) injection 30 mg, 30 mg, Subcutaneous, BID  senna (SENOKOT) tablet 8.6 mg, 1 tablet, Oral, Nightly  butalbital-acetaminophen-caffeine (FIORICET, ESGIC) per tablet 2 tablet, 2 tablet, Oral, Q4H PRN  neomycin-bacitracin-polymyxin (NEOSPORIN) ointment, , Topical, BID  amLODIPine (NORVASC) tablet 10 mg, 10 mg, Oral, Daily  hyoscyamine (ANASPAZ;LEVSIN) tablet 125 mcg, 125 mcg, Oral, Q4H PRN  levothyroxine (SYNTHROID) tablet 25 mcg, 25 mcg, Oral, Daily  pantoprazole (PROTONIX) tablet 40 mg, 40 mg, Oral, Daily  triamterene-hydrochlorothiazide (MAXZIDE-25) 37.5-25 MG per tablet 1 tablet, 1 tablet, Oral, Daily  sodium chloride flush 0.9 % injection 10 mL, 10 mL, Intravenous, 2 times per day  sodium chloride flush 0.9 % injection 10 mL, 10 mL, Intravenous,

## 2018-09-06 VITALS
SYSTOLIC BLOOD PRESSURE: 149 MMHG | BODY MASS INDEX: 29.71 KG/M2 | WEIGHT: 174 LBS | HEART RATE: 56 BPM | OXYGEN SATURATION: 93 % | DIASTOLIC BLOOD PRESSURE: 67 MMHG | HEIGHT: 64 IN | TEMPERATURE: 98.5 F | RESPIRATION RATE: 14 BRPM

## 2018-09-06 PROBLEM — S06.0X1A CONCUSSION WITH LOSS OF CONSCIOUSNESS OF 30 MINUTES OR LESS: Status: RESOLVED | Noted: 2018-08-30 | Resolved: 2018-09-06

## 2018-09-06 LAB
ALBUMIN SERPL-MCNC: 3.4 G/DL (ref 3.5–5.2)
ALP BLD-CCNC: 66 U/L (ref 35–104)
ALT SERPL-CCNC: 51 U/L (ref 0–32)
ANION GAP SERPL CALCULATED.3IONS-SCNC: 14 MMOL/L (ref 7–16)
AST SERPL-CCNC: 29 U/L (ref 0–31)
BASOPHILS ABSOLUTE: 0.08 E9/L (ref 0–0.2)
BASOPHILS RELATIVE PERCENT: 1.1 % (ref 0–2)
BILIRUB SERPL-MCNC: 1.1 MG/DL (ref 0–1.2)
BUN BLDV-MCNC: 15 MG/DL (ref 8–23)
CALCIUM SERPL-MCNC: 9 MG/DL (ref 8.6–10.2)
CHLORIDE BLD-SCNC: 98 MMOL/L (ref 98–107)
CO2: 25 MMOL/L (ref 22–29)
CREAT SERPL-MCNC: 0.9 MG/DL (ref 0.5–1)
EOSINOPHILS ABSOLUTE: 0.15 E9/L (ref 0.05–0.5)
EOSINOPHILS RELATIVE PERCENT: 2 % (ref 0–6)
GFR AFRICAN AMERICAN: >60
GFR NON-AFRICAN AMERICAN: >60 ML/MIN/1.73
GLUCOSE BLD-MCNC: 96 MG/DL (ref 74–109)
HCT VFR BLD CALC: 32.1 % (ref 34–48)
HEMOGLOBIN: 10.9 G/DL (ref 11.5–15.5)
IMMATURE GRANULOCYTES #: 0.04 E9/L
IMMATURE GRANULOCYTES %: 0.5 % (ref 0–5)
LYMPHOCYTES ABSOLUTE: 2.26 E9/L (ref 1.5–4)
LYMPHOCYTES RELATIVE PERCENT: 30.4 % (ref 20–42)
MAGNESIUM: 2.3 MG/DL (ref 1.6–2.6)
MCH RBC QN AUTO: 30.2 PG (ref 26–35)
MCHC RBC AUTO-ENTMCNC: 34 % (ref 32–34.5)
MCV RBC AUTO: 88.9 FL (ref 80–99.9)
MONOCYTES ABSOLUTE: 0.93 E9/L (ref 0.1–0.95)
MONOCYTES RELATIVE PERCENT: 12.5 % (ref 2–12)
NEUTROPHILS ABSOLUTE: 3.98 E9/L (ref 1.8–7.3)
NEUTROPHILS RELATIVE PERCENT: 53.5 % (ref 43–80)
PDW BLD-RTO: 13.1 FL (ref 11.5–15)
PLATELET # BLD: 313 E9/L (ref 130–450)
PMV BLD AUTO: 9.3 FL (ref 7–12)
POTASSIUM REFLEX MAGNESIUM: 3.3 MMOL/L (ref 3.5–5)
RBC # BLD: 3.61 E12/L (ref 3.5–5.5)
SODIUM BLD-SCNC: 137 MMOL/L (ref 132–146)
TOTAL PROTEIN: 5.9 G/DL (ref 6.4–8.3)
WBC # BLD: 7.4 E9/L (ref 4.5–11.5)

## 2018-09-06 PROCEDURE — 6370000000 HC RX 637 (ALT 250 FOR IP): Performed by: STUDENT IN AN ORGANIZED HEALTH CARE EDUCATION/TRAINING PROGRAM

## 2018-09-06 PROCEDURE — 36415 COLL VENOUS BLD VENIPUNCTURE: CPT

## 2018-09-06 PROCEDURE — 97140 MANUAL THERAPY 1/> REGIONS: CPT

## 2018-09-06 PROCEDURE — 6370000000 HC RX 637 (ALT 250 FOR IP): Performed by: SURGERY

## 2018-09-06 PROCEDURE — 6360000002 HC RX W HCPCS: Performed by: STUDENT IN AN ORGANIZED HEALTH CARE EDUCATION/TRAINING PROGRAM

## 2018-09-06 PROCEDURE — 6370000000 HC RX 637 (ALT 250 FOR IP): Performed by: PHYSICIAN ASSISTANT

## 2018-09-06 PROCEDURE — 2580000003 HC RX 258: Performed by: STUDENT IN AN ORGANIZED HEALTH CARE EDUCATION/TRAINING PROGRAM

## 2018-09-06 PROCEDURE — 85025 COMPLETE CBC W/AUTO DIFF WBC: CPT

## 2018-09-06 PROCEDURE — 99238 HOSP IP/OBS DSCHRG MGMT 30/<: CPT | Performed by: SURGERY

## 2018-09-06 PROCEDURE — 83735 ASSAY OF MAGNESIUM: CPT

## 2018-09-06 PROCEDURE — 97112 NEUROMUSCULAR REEDUCATION: CPT

## 2018-09-06 PROCEDURE — 97535 SELF CARE MNGMENT TRAINING: CPT

## 2018-09-06 PROCEDURE — 80053 COMPREHEN METABOLIC PANEL: CPT

## 2018-09-06 PROCEDURE — 97530 THERAPEUTIC ACTIVITIES: CPT

## 2018-09-06 RX ORDER — BUTALBITAL, ACETAMINOPHEN AND CAFFEINE 50; 325; 40 MG/1; MG/1; MG/1
2 TABLET ORAL EVERY 4 HOURS PRN
Qty: 180 TABLET | Refills: 3 | DISCHARGE
Start: 2018-09-06

## 2018-09-06 RX ORDER — LIDOCAINE 50 MG/G
1 PATCH TOPICAL DAILY
Qty: 30 PATCH | Refills: 0 | DISCHARGE
Start: 2018-09-07 | End: 2019-01-28 | Stop reason: ALTCHOICE

## 2018-09-06 RX ORDER — POTASSIUM CHLORIDE 20 MEQ/1
20 TABLET, EXTENDED RELEASE ORAL 2 TIMES DAILY WITH MEALS
Status: DISCONTINUED | OUTPATIENT
Start: 2018-09-06 | End: 2018-09-06 | Stop reason: HOSPADM

## 2018-09-06 RX ORDER — POTASSIUM CHLORIDE 7.45 MG/ML
10 INJECTION INTRAVENOUS
Status: COMPLETED | OUTPATIENT
Start: 2018-09-06 | End: 2018-09-06

## 2018-09-06 RX ADMIN — DOCUSATE SODIUM 100 MG: 100 CAPSULE, LIQUID FILLED ORAL at 08:35

## 2018-09-06 RX ADMIN — OXYCODONE HYDROCHLORIDE 10 MG: 10 TABLET ORAL at 12:53

## 2018-09-06 RX ADMIN — POTASSIUM CHLORIDE 10 MEQ: 10 INJECTION, SOLUTION INTRAVENOUS at 06:46

## 2018-09-06 RX ADMIN — OXYCODONE HYDROCHLORIDE 10 MG: 10 TABLET ORAL at 00:03

## 2018-09-06 RX ADMIN — POTASSIUM CHLORIDE 10 MEQ: 10 INJECTION, SOLUTION INTRAVENOUS at 09:41

## 2018-09-06 RX ADMIN — LEVOTHYROXINE SODIUM 25 MCG: 25 TABLET ORAL at 06:46

## 2018-09-06 RX ADMIN — BACITRACIN, NEOMYCIN, POLYMYXIN B 1 G: 400; 3.5; 5 OINTMENT TOPICAL at 08:35

## 2018-09-06 RX ADMIN — TRIAMTERENE AND HYDROCHLOROTHIAZIDE 1 TABLET: 37.5; 25 TABLET ORAL at 08:34

## 2018-09-06 RX ADMIN — PANTOPRAZOLE SODIUM 40 MG: 40 TABLET, DELAYED RELEASE ORAL at 08:34

## 2018-09-06 RX ADMIN — POLYETHYLENE GLYCOL 3350 17 G: 17 POWDER, FOR SOLUTION ORAL at 08:35

## 2018-09-06 RX ADMIN — AMLODIPINE BESYLATE 10 MG: 10 TABLET ORAL at 08:34

## 2018-09-06 RX ADMIN — BUTALBITAL, ACETAMINOPHEN AND CAFFEINE 2 TABLET: 50; 325; 40 TABLET ORAL at 14:34

## 2018-09-06 RX ADMIN — POTASSIUM CHLORIDE 10 MEQ: 10 INJECTION, SOLUTION INTRAVENOUS at 10:33

## 2018-09-06 RX ADMIN — ENOXAPARIN SODIUM 30 MG: 30 INJECTION SUBCUTANEOUS at 08:34

## 2018-09-06 RX ADMIN — OXYCODONE HYDROCHLORIDE 10 MG: 10 TABLET ORAL at 04:05

## 2018-09-06 RX ADMIN — POTASSIUM CHLORIDE 20 MEQ: 20 TABLET, EXTENDED RELEASE ORAL at 08:34

## 2018-09-06 RX ADMIN — LEVETIRACETAM 500 MG: 500 TABLET, FILM COATED ORAL at 08:34

## 2018-09-06 RX ADMIN — Medication 10 ML: at 08:34

## 2018-09-06 RX ADMIN — POTASSIUM CHLORIDE 10 MEQ: 10 INJECTION, SOLUTION INTRAVENOUS at 08:30

## 2018-09-06 ASSESSMENT — PAIN DESCRIPTION - PAIN TYPE
TYPE: ACUTE PAIN

## 2018-09-06 ASSESSMENT — PAIN DESCRIPTION - DESCRIPTORS
DESCRIPTORS: ACHING;CONSTANT;DISCOMFORT;HEADACHE
DESCRIPTORS: HEADACHE
DESCRIPTORS: ACHING;DISCOMFORT
DESCRIPTORS: ACHING;CONSTANT;DISCOMFORT
DESCRIPTORS: ACHING;DISCOMFORT

## 2018-09-06 ASSESSMENT — PAIN DESCRIPTION - PROGRESSION
CLINICAL_PROGRESSION: GRADUALLY IMPROVING
CLINICAL_PROGRESSION: GRADUALLY IMPROVING
CLINICAL_PROGRESSION: GRADUALLY WORSENING

## 2018-09-06 ASSESSMENT — PAIN DESCRIPTION - FREQUENCY
FREQUENCY: CONTINUOUS

## 2018-09-06 ASSESSMENT — PAIN DESCRIPTION - ORIENTATION: ORIENTATION: RIGHT

## 2018-09-06 ASSESSMENT — PAIN DESCRIPTION - LOCATION
LOCATION: GENERALIZED
LOCATION: HEAD
LOCATION: HEAD
LOCATION: SHOULDER
LOCATION: GENERALIZED

## 2018-09-06 ASSESSMENT — PAIN SCALES - GENERAL
PAINLEVEL_OUTOF10: 7
PAINLEVEL_OUTOF10: 9
PAINLEVEL_OUTOF10: 0
PAINLEVEL_OUTOF10: 8
PAINLEVEL_OUTOF10: 7
PAINLEVEL_OUTOF10: 4
PAINLEVEL_OUTOF10: 8
PAINLEVEL_OUTOF10: 0
PAINLEVEL_OUTOF10: 6

## 2018-09-06 ASSESSMENT — PAIN DESCRIPTION - ONSET
ONSET: ON-GOING

## 2018-09-06 NOTE — CARE COORDINATION
Salvador spoke to oc; he reports that they plan to discharge today; informed him that we can arrange facility van  at 4pm; will await final dc orders
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
## 2018-09-06 NOTE — CARE COORDINATION
SOCIAL WORK / DISCHARGE PLANNING:  Pt to discharge to [facility] skilled today. Moishe Favre to provide transport approx 4pm. Patti SALOMON aware. Family at bedside aware.                                Electronically signed by DEBRA Louise on 9/6/2018 at 2:49 PM

## 2018-09-06 NOTE — PROGRESS NOTES
cup with straw at bed level/seated EOB  Min A;    Pt able to complete self feeding with Min A and increased time. Grooming  Max A bed level Min A;    Pt able to wash face and brush teeth with Min verbal prompting to increase time to complete. Min A to comb hair with due to limited ability/ROM. Upper Body Dressing Max A  To adjust gown around body/thread through UEs   Max A;    Max A with Mod verbal prompting to laura/doff gown and sling with increased time. Lower Body Dressing DEP Dep; To laura/doff socks and pants while sitting up on the EOB. Bathing DEP  N/T     Toileting  DEP  Min A;    Pt able to transfer on/off BSC with Min A and Min verbal for proper hand placement. Min A to complete hygiene while in standing to maximize safety. Bed Mobility  Supine to Sit: Max Ax2  Sit to Supine:DEPx2  Max cues for sequencing and safe technique for pain mgmt, increased time required   Sup > Sit: Min A;    Min verbal prompting to increase proper positioning while transferring from supine to sitting position with HOB elevated and Min A. Pt able to sit at the EOB for 10 minutes with sup and good balance. Functional Transfers NT; pt refusing this date   Sit <> stand: Min A;    Min A and Min verbal prompting for proper hand placement to transfer from sit to standing position with use of SBQC. Functional Mobility NT  Mobility: Min A;    Min A with Min verbal prompting to increase balance and proper body alignment with SBQC to decrease risk of falling due to dizziness.        Sit balance: Sup;Good  Stand balance: Min A; Fair  Endurance/Activity tolerance: Fair pt shows good participation however requires increased time to complete simple tasks due to level of pain. Comments: Upon arrival pt in supine with sister in room. At end of session pt transferred back into supine with RUE elevated, tray table in front of her, all lines and tubes intact, call light within reach and sister in room.

## 2018-09-09 NOTE — DISCHARGE SUMMARY
medical attention within 2-7 days and further imaging is recommended. Ct Head Wo Contrast    Result Date: 2018  Patient MRN:  [id number] : 1950 Age: 80 years Gender: Female Order Date:  2018 12:00 PM EXAM: CT HEAD WO CONTRAST NUMBER OF IMAGES:  106 INDICATION:  trauma, increasing SDH on last CT COMPARISON: Previous noncontrasted CT study 0715 hours today showing increasing posterior subdural and subarachnoid blood near the convexities superiorly TECHNIQUE: Axial noncontrast images were extended through the head, and are reformatted in all 3 imaging planes. Images reviewed at soft tissue and bone window settings. Low-dose CT  acquisition technique included one of following options; 1 . Automated exposure control, 2. Adjustment of MA and or KV according to patient's size or 3. Use of iterative reconstruction. FINDINGS: Extra-axial spaces suggesting chronic or isodense subdurals appear similar to the earlier exams today, with prominence primarily over the convexities and anterior frontoparietal regions. Measurements in the frontoparietal junction superiorly currently measure about 8.2 mm on the right and 6.5 mm on the left, which is similar to the prior exam. Subtle density in the dependent subdural space on the right is consistent with pooling of small amounts of blood, and likewise does not suggest increasing/active bleeding. Small amounts of subarachnoid blood are noted along the posterior falx on the right, and over the posterior parietal regions bilaterally to level of the lateral ventricular bodies, and are less conspicuous, suggesting cessation of active bleeding and dispersion of limited blood. Asymmetric blood in the posterior body of the left lateral ventricle is unchanged. There is no evidence of skeletal trauma or sinus opacification. Extracranial soft tissue show no evidence of acute pathology.  FINDINGS: Subarachnoid blood along the posterior falx and posterior parietal regions bilaterally appears slightly less conspicuous, making active bleeding less likely, and indicating dispersion of existing blood. Blood in the left lateral ventricle has not increased. Prominence of extra-axial spaces consistent with subdural hematomas are stable in appearance, with some dependent blood noted in the subdural space posteriorly on the right, but no new or progressive bleeding is identified, and the magnitude of findings is stable. Ct Head Wo Contrast    Result Date: 2018  Patient MRN: [id number] : 1950 Age:  80 years Order Date: 2018 5:00 AM Gender: Female Exam: CT HEAD WO CONTRAST Number of Images: 113 Indication:   Follow-up exam. Trauma. Comparison: Head CT 2018. Findings: This examination was performed on a CT scanner with dose reduction. Technique: Low-dose CT  acquisition technique included one of following options; 1 . Automated exposure control, 2. Adjustment of MA and or KV according to patient's size or 3. Use of iterative reconstruction. Subdural hematoma along the falx cerebri superior and posterior measuring approximately 0.69 cm transverse dimension by approximately 1.5 cm longitudinal. Subarachnoid blood products layering in the frontal and parietal lobe sulci. Posteriorly located subdural versus epidural hematoma with estimated transverse dimension and approximately 1.2 cm, stable since previous exam. Bilateral subdural hematomas are noted with estimated transverse dimension on the left overlying the left frontal lobe at 0.5 cm and on the right overlying the right frontal lobe 0.83 cm, one on the right has increased significantly in size since previous exam. There is no evidence of subfalcine herniation or shift. Intraventricular blood products are again noted in the left lateral ventricle. There is slight crowding of the bilateral ambient cisterns. Small amount of blood products layering in the left sylvian fissure.  Subarachnoid blood products noted in the left temporal lobe. Vascular calcifications within the bilateral internal carotid artery cavernous segments. ALERT:  THIS IS AN ABNORMAL REPORT. Findings communicated directly with Dr. Mary Schilling at approximately 8/31/2018 11:49 AM . 1. Subarachnoid blood products bilateral frontal and parietal lobes, left sylvian fissure and left temporal lobe. 2. Enlarging right subdural hematoma. Stable left subdural hematoma. Enlarging subdural hematoma along the posterior superior falx cerebri. 3. Intraventricular blood products left lateral ventricular system. 4. Vascular calcifications within the bilateral internal carotid artery cavernous segments. Ct Head Wo Contrast    Addendum Date: 8/31/2018    Initial report created on 8/31/2018 1:50:30 AM EDT EXAM:   CT Head Without Intravenous Contrast EXAM DATE/TIME:   8/30/2018 11:00 PM CLINICAL HISTORY:   80years old, female; Signs and symptoms; Other: Possible intraventricular hemorrhage; Additional info: F/u sah, sdh, possible intraventricular hemorrhage TECHNIQUE:   Axial computed tomography images of the head/brain without intravenous contrast.  All CT scans at this facility use at least one of these dose optimization techniques: automated exposure control; mA and/or kV adjustment per patient size (includes targeted exams where dose is matched to clinical indication); or iterative reconstruction. Coronal and sagittal reformatted images were created and reviewed. COMPARISON:   CT HEAD WO CONTRAST 8/30/2018 6:51 PM FINDINGS:   Brain:  Right posterior parietal subdural hemorrhage measuring up to 9 mm in thickness, increased in size when compared to the prior study. New left posterior parietal subdural hematoma to 7 mm in thickness. New subdural hematoma along the right aspect of the posterior falx measuring up to 6 mm in thickness. Mild bilateral parietal cerebral subarachnoid hemorrhages and tiny gyral contusions again seen.  Prominent bifrontal CSF spaces, each measuring created on 8/31/2018 1:50:30 AM EDT EXAM:   CT Head Without Intravenous Contrast EXAM DATE/TIME:   8/30/2018 11:00 PM CLINICAL HISTORY:   80years old, female; Signs and symptoms; Other: Possible intraventricular hemorrhage; Additional info: F/u sah, sdh, possible intraventricular hemorrhage TECHNIQUE:   Axial computed tomography images of the head/brain without intravenous contrast.  All CT scans at this facility use at least one of these dose optimization techniques: automated exposure control; mA and/or kV adjustment per patient size (includes targeted exams where dose is matched to clinical indication); or iterative reconstruction. Coronal and sagittal reformatted images were created and reviewed. COMPARISON:   CT HEAD WO CONTRAST 8/30/2018 6:51 PM FINDINGS:   Brain:  Right posterior parietal subdural hemorrhage measuring up to 9 mm in thickness, increased in size when compared to the prior study. New left posterior parietal subdural hematoma to 7 mm in thickness. New subdural hematoma along the right aspect of the posterior falx measuring up to 6 mm in thickness. Mild bilateral parietal cerebral subarachnoid hemorrhages and tiny gyral contusions again seen. Prominent bifrontal CSF spaces, each measuring approximately 8 mm in thickness with mixed density fluid again seen. No significant white matter disease. Ventricles:  1.6 x 0.5 x 0.5 cm focus of intraventricular hemorrhage in the posterior body of the left lateral ventricle again seen. No ventriculomegaly. No midline shift. Bones/joints:  Unremarkable. No acute fracture. Soft tissues: Moderate left lower facial contusion partially seen. Vasculature: Moderate to marked bilateral intracranial internal carotid artery atherosclerotic calcifications. Sinuses:  Unremarkable. No acute sinusitis. No air-fluid levels. Mastoid air cells:  Unremarkable. No mastoid effusions. Orbits:  Unremarkable. Sella:   The sella again appears prominent and the subarachnoid hemorrhage. 2. Mixed density bilateral subdural hemorrhage. 3. Probable left intraventricular hemorrhage. ALERT:  CRITICAL RESULT The findings were discussed by Dr. Joel Honeycutt with Dr. Staci Starkey at [address] hours on 2018. Ct Facial Bones Wo Contrast    Result Date: 2018  Patient MRN:  [id number] : 1950 Age: 80 years Gender: Female Order Date:  2018 6:45 PM EXAM: CT FACIAL BONES WO CONTRAST COMPARISON: None INDICATION:  trauma  TECHNIQUE: Axial unenhanced CT performed through the facial bones. Reformatted coronal and sagittal views performed. This data then utilized to create three-dimensional volume rendered images of the facial bones. Low-dose CT acquisition technique included one of the following options; 1. Automated exposure control, 2. Adjustment of mA and/or kV according to the patient's size or 3. Use of iterative reconstruction. FINDINGS: No evidence of facial bone fracture. Paranasal sinuses are clear. Mastoid air cells are clear. Globes and orbits are intact. No evidence of facial bone fracture. Ct Chest W Contrast    Result Date: 2018  Patient Name:  Andres Clay Patient MRN:  [id number] Patient :  1950 Patient Age:  80 years Patient Gender:  Female Order Date:2018 6:45 PM EXAM:  CT CHEST W CONTRAST NUMBER OF IMAGES:  321 INDICATION:   trauma  chest pain COMPARISON: None. Technique: Low-dose CT  acquisition technique included one of following options; 1 . Automated exposure control, 2. Adjustment of MA and or KV according to patient's size or 3. Use of iterative reconstruction. Multiple axial images were obtained from the apices of the lungs through the lung bases. Sagittal and coronal reconstructions performed for aid in interpretation of the study. Radha Solomon FINDINGS: The lung fields demonstrate evidence for  patchy air space disease. The findings are most suspicious for atelectasis.  No definite pulmonary mass is identified No significant enhancement. No fluid accumulation seen in the right or in the left upper quadrants. Small gallstone of about 6 mm in the upper neck of the gallbladder. The gallbladder is normally distended, it appears unremarkable. Pancreas has unremarkable appearance. Degenerative pancreatic ductal systems are not dilated. Adrenals not enlarged Kidneys have normal size and cortical thickness and enhancement. There is no obstruction. There is normal excretion of the contrast by the kidneys. The bladder has unremarkable appearance. Uterus and ovaries have unremarkable appearance. There are no acute inflammatory changes in the omental mesenteric fat planes, free intraperitoneal air, ascites or indication for bowel obstruction. There is no indication for contusion of the omental mesenteric fat planes or bowel wall. There is no stranding of the pericolonic fat planes. Most likely patient previous right-sided laminectomy L5 level. Costovertebral junctions of the lower thoracic spine segments appear unremarkable. Pedicles and spinous processes of the lumbar spine are preserved. Degenerative changes are seen predominant in the facet joints of L4-5 which explains the small anterolisthesis of L4. More advanced degenerative changes are seen in the L5-S1 disc space. Vertebral bodies have normal height. No acute fractures are seen in the lumbar sacral spine or in the visualized lower thoracic spine segments. Sacral wings are intact. No acute fractures are seen in the pelvic bones. Both hip joints appear unremarkable. No fracture seen in the area of the hip joints. No indication for an acute trauma injury to the intrathoracic or retroperitoneal structures or to the bone structures of the lower thoracic spine segments, lumbar and sacral spines and pelvic bones.      Ct Shoulder Right Wo Contrast    Result Date: 8/31/2018  Initial report created on 8/31/2018 2:09:09 AM EDT EXAM:   CT Right Upper Extremity Without Intravenous Contrast,

## 2018-09-13 DIAGNOSIS — I62.00 SUBDURAL HEMORRHAGE (HCC): ICD-10-CM

## 2018-09-13 DIAGNOSIS — I60.9 SAH (SUBARACHNOID HEMORRHAGE) (HCC): Primary | ICD-10-CM

## 2018-09-19 ENCOUNTER — OFFICE VISIT (OUTPATIENT)
Dept: ORTHOPEDIC SURGERY | Age: 68
End: 2018-09-19
Payer: MEDICARE

## 2018-09-19 VITALS
TEMPERATURE: 97 F | WEIGHT: 165 LBS | HEIGHT: 64 IN | HEART RATE: 85 BPM | SYSTOLIC BLOOD PRESSURE: 115 MMHG | DIASTOLIC BLOOD PRESSURE: 79 MMHG | BODY MASS INDEX: 28.17 KG/M2

## 2018-09-19 DIAGNOSIS — S43.004D DISLOCATION OF RIGHT SHOULDER JOINT, SUBSEQUENT ENCOUNTER: Primary | ICD-10-CM

## 2018-09-19 DIAGNOSIS — S62.304D CLOSED NONDISPLACED FRACTURE OF FOURTH METACARPAL BONE OF RIGHT HAND WITH ROUTINE HEALING, UNSPECIFIED PORTION OF METACARPAL, SUBSEQUENT ENCOUNTER: ICD-10-CM

## 2018-09-19 PROCEDURE — 99024 POSTOP FOLLOW-UP VISIT: CPT | Performed by: ORTHOPAEDIC SURGERY

## 2018-09-19 PROCEDURE — L3908 WHO COCK-UP NONMOLDE PRE OTS: HCPCS | Performed by: ORTHOPAEDIC SURGERY

## 2018-09-19 NOTE — PROGRESS NOTES
A wrist brace was applied to Her Right wrist(s). Use and care of the brace was reviewed with patient The patient denied any issues with fit or comfort of the braces  Patient instructed to call our office if there are any issues with the braces.
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
## 2018-09-27 ENCOUNTER — TELEPHONE (OUTPATIENT)
Dept: ORTHOPEDIC SURGERY | Age: 68
End: 2018-09-27

## 2018-09-28 ENCOUNTER — OFFICE VISIT (OUTPATIENT)
Dept: NEUROSURGERY | Age: 68
End: 2018-09-28
Payer: MEDICARE

## 2018-09-28 ENCOUNTER — HOSPITAL ENCOUNTER (OUTPATIENT)
Dept: CT IMAGING | Age: 68
Discharge: HOME | End: 2018-09-30
Payer: MEDICARE

## 2018-09-28 VITALS
HEART RATE: 62 BPM | HEIGHT: 64 IN | DIASTOLIC BLOOD PRESSURE: 76 MMHG | WEIGHT: 162 LBS | SYSTOLIC BLOOD PRESSURE: 120 MMHG | BODY MASS INDEX: 27.66 KG/M2

## 2018-09-28 DIAGNOSIS — I62.00 SUBDURAL HEMORRHAGE (HCC): ICD-10-CM

## 2018-09-28 DIAGNOSIS — S06.5XAA SUBDURAL HEMATOMA: Primary | ICD-10-CM

## 2018-09-28 DIAGNOSIS — I60.9 SAH (SUBARACHNOID HEMORRHAGE) (HCC): ICD-10-CM

## 2018-09-28 PROCEDURE — 70450 CT HEAD/BRAIN W/O DYE: CPT

## 2018-09-28 PROCEDURE — 99212 OFFICE O/P EST SF 10 MIN: CPT | Performed by: PHYSICIAN ASSISTANT

## 2018-09-28 NOTE — PROGRESS NOTES
Patient is here for follow up from a hospital consult for:  SDH/SAH. She continues to have intermittent headaches, word finding difficulties. Physical exam  Alert and Oriented X3  PERRLA, EOMI  WALLER 5/5, right arm in sling  Sensation intact to LT and PP  Reflexes are 2+ and symmetric    A/P: patient is here for follow up for:  SAH/SDH. F/u head CT shows resolutionof posterior SAH and SDH. She continues to have bilateral frontal subdural hygromas with very small acute aspect of acute SDH in left frontal.  No antiocoagulants.   F/u 2 months with head CT

## 2018-10-17 ENCOUNTER — OFFICE VISIT (OUTPATIENT)
Dept: ORTHOPEDIC SURGERY | Age: 68
End: 2018-10-17

## 2018-10-17 VITALS
SYSTOLIC BLOOD PRESSURE: 133 MMHG | WEIGHT: 161 LBS | BODY MASS INDEX: 27.49 KG/M2 | HEIGHT: 64 IN | DIASTOLIC BLOOD PRESSURE: 72 MMHG | HEART RATE: 51 BPM

## 2018-10-17 DIAGNOSIS — S62.304D CLOSED NONDISPLACED FRACTURE OF FOURTH METACARPAL BONE OF RIGHT HAND WITH ROUTINE HEALING, UNSPECIFIED PORTION OF METACARPAL, SUBSEQUENT ENCOUNTER: ICD-10-CM

## 2018-10-17 DIAGNOSIS — S43.004D DISLOCATION OF RIGHT SHOULDER JOINT, SUBSEQUENT ENCOUNTER: Primary | ICD-10-CM

## 2018-10-17 PROCEDURE — 99024 POSTOP FOLLOW-UP VISIT: CPT | Performed by: ORTHOPAEDIC SURGERY
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
## 2018-10-18 ENCOUNTER — EVALUATION (OUTPATIENT)
Dept: PHYSICAL THERAPY | Age: 68
End: 2018-10-18
Payer: MEDICARE

## 2018-10-18 DIAGNOSIS — M25.611 DECREASED RANGE OF MOTION OF RIGHT SHOULDER: ICD-10-CM

## 2018-10-18 DIAGNOSIS — S43.004D DISLOCATION OF RIGHT SHOULDER JOINT, SUBSEQUENT ENCOUNTER: ICD-10-CM

## 2018-10-18 DIAGNOSIS — M62.511 MUSCLE WASTING AND ATROPHY, NOT ELSEWHERE CLASSIFIED, RIGHT SHOULDER: ICD-10-CM

## 2018-10-18 DIAGNOSIS — M25.641 JOINT STIFFNESS OF HAND, RIGHT: ICD-10-CM

## 2018-10-18 DIAGNOSIS — M62.541 MUSCLE WASTING AND ATROPHY, NOT ELSEWHERE CLASSIFIED, RIGHT HAND: ICD-10-CM

## 2018-10-18 DIAGNOSIS — S62.304D OPEN DISPLACED FRACTURE OF FOURTH METACARPAL BONE OF RIGHT HAND WITH ROUTINE HEALING, UNSPECIFIED PORTION OF METACARPAL, SUBSEQUENT ENCOUNTER: Primary | ICD-10-CM

## 2018-10-18 PROCEDURE — 97110 THERAPEUTIC EXERCISES: CPT | Performed by: PHYSICAL THERAPIST

## 2018-10-18 PROCEDURE — 97162 PT EVAL MOD COMPLEX 30 MIN: CPT | Performed by: PHYSICAL THERAPIST

## 2018-10-18 PROCEDURE — G8985 CARRY GOAL STATUS: HCPCS | Performed by: PHYSICAL THERAPIST

## 2018-10-18 PROCEDURE — G8984 CARRY CURRENT STATUS: HCPCS | Performed by: PHYSICAL THERAPIST

## 2018-10-18 NOTE — PROGRESS NOTES
RUE, independent dressing, self care/ADLs     Occupation:  Retired    ; Status:  na    Medical Management for Current Problem:  [] Chiro  []  CT:  []  Injection:   [x]  Meds:   []  MRI:  [x]  Ortho  []  PCP  [x]  PT/OT: rehab and home health  []  X-ray:  []  Surgery:  []  Other:       Outcome Measure: PT G-Codes  Functional Assessment Tool Used: QDASH  Score: 90%  Functional Limitation: Carrying, moving and handling objects  Carrying, Moving and Handling Objects Current Status (): At least 80 percent but less than 100 percent impaired, limited or restricted  Carrying, Moving and Handling Objects Goal Status (): At least 20 percent but less than 40 percent impaired, limited or restricted    Precautions: subdural and subarachnoid hematoma. Gets dizzy spells    OBJECTIVE:     See rehab sheet for today's treatment    Time In:   12:35p            Time Out: 1:30p             Visit #: 1    Treatment Charges:   Mins Units   Initial Eval-Low Complexity       Initial Eval-Mod Complexity   25 1   Initial Eval-High Complexity       Re-Evaluation       Ther Exercise   30 2   Neuromuscular Re-ed       Therapeutic Activities       Manual Therapy       ADL/Home Training       Gait Training       Group Therapy     Modalities     Non-Billable Service Time       Other     Total Time/Units 55 3       [] Group therapy billed for portions of treatment that were considered simultaneous activities per certain provider guidelines.     Inspection:  Standing  Scapulo-humeral Rhythm: [] Normal  [] Irregular    Thoracic:                            [] Increased Kyphosis [] Decreased Kyphosis  Lumbar:                  [] Increased Lordosis [] Decreased Lordosis                 [] No reversal of curve   Knees:  [] Genu Valgus [] Genu Varus  [] Genu Recurvatum[] Normal  Tibia:  [] Tibial Varus  [] Normal  Feet:  [] Calcaneal Valgus   [] Calcaneal Varus   [] Hallux Valgus    [] Supination  [] Pronation          [] Pes Planus    [] Pes

## 2018-10-23 ENCOUNTER — TREATMENT (OUTPATIENT)
Dept: PHYSICAL THERAPY | Age: 68
End: 2018-10-23
Payer: MEDICARE

## 2018-10-23 DIAGNOSIS — M25.641 JOINT STIFFNESS OF HAND, RIGHT: ICD-10-CM

## 2018-10-23 DIAGNOSIS — M62.511 MUSCLE WASTING AND ATROPHY, NOT ELSEWHERE CLASSIFIED, RIGHT SHOULDER: ICD-10-CM

## 2018-10-23 DIAGNOSIS — M62.541 MUSCLE WASTING AND ATROPHY, NOT ELSEWHERE CLASSIFIED, RIGHT HAND: ICD-10-CM

## 2018-10-23 DIAGNOSIS — M25.611 DECREASED RANGE OF MOTION OF RIGHT SHOULDER: ICD-10-CM

## 2018-10-23 DIAGNOSIS — S62.304D OPEN DISPLACED FRACTURE OF FOURTH METACARPAL BONE OF RIGHT HAND WITH ROUTINE HEALING, UNSPECIFIED PORTION OF METACARPAL, SUBSEQUENT ENCOUNTER: Primary | ICD-10-CM

## 2018-10-23 DIAGNOSIS — S43.004D DISLOCATION OF RIGHT SHOULDER JOINT, SUBSEQUENT ENCOUNTER: ICD-10-CM

## 2018-10-23 PROCEDURE — 97110 THERAPEUTIC EXERCISES: CPT

## 2018-10-26 ENCOUNTER — TREATMENT (OUTPATIENT)
Dept: PHYSICAL THERAPY | Age: 68
End: 2018-10-26
Payer: MEDICARE

## 2018-10-26 DIAGNOSIS — S43.004D DISLOCATION OF RIGHT SHOULDER JOINT, SUBSEQUENT ENCOUNTER: ICD-10-CM

## 2018-10-26 DIAGNOSIS — M62.541 MUSCLE WASTING AND ATROPHY, NOT ELSEWHERE CLASSIFIED, RIGHT HAND: ICD-10-CM

## 2018-10-26 DIAGNOSIS — M25.611 DECREASED RANGE OF MOTION OF RIGHT SHOULDER: ICD-10-CM

## 2018-10-26 DIAGNOSIS — M62.511 MUSCLE WASTING AND ATROPHY, NOT ELSEWHERE CLASSIFIED, RIGHT SHOULDER: ICD-10-CM

## 2018-10-26 DIAGNOSIS — M25.641 JOINT STIFFNESS OF HAND, RIGHT: ICD-10-CM

## 2018-10-26 DIAGNOSIS — S62.304D OPEN DISPLACED FRACTURE OF FOURTH METACARPAL BONE OF RIGHT HAND WITH ROUTINE HEALING, UNSPECIFIED PORTION OF METACARPAL, SUBSEQUENT ENCOUNTER: Primary | ICD-10-CM

## 2018-10-26 PROCEDURE — 97110 THERAPEUTIC EXERCISES: CPT | Performed by: PHYSICAL THERAPIST

## 2018-10-26 NOTE — PROGRESS NOTES
[]   Other:    Palpation:  ROM:  Strength:  Joint Integrity:    ASSESSMENT       [] Progressing toward goals. [] No change  [] Patient required verbal/tactile cueing for prompting/correction for:  [] Patient demonstrates ability to progress intensity for the following therapeutic procedures per rehab sheet. [] Patient demonstrates independence in procedures indicated on rehab sheet and will      be discharged to HEP. [x] Tolerated today's therapy session with: mild discomfort, but no worse. PT provided verbal cuing for proper therapeutic rest periods. [] Comment:    Pt. Education:  [] Yes  [] No                               []   Reviewed Prior HEP/Ed       []  Instructed in and practiced updated HEP. []  Other:    Method of Education: [] Verbal  [] Demo  [] Written    Comprehension of Education:  [] Verbalizes understanding. [] Demonstrates understanding. [] Needs review. [] Demonstrates/verbalizes HEP/Ed previously given. PLAN          [] Re-evaluate next session. [x] Continue per plan of care. [] Progress to discharge/HEP over the next ____ visits.   [] Hold skilled PT d/t:    [] Discharge from skilled PT services                [x] Advance:    as tolerated  [] Discontinue:     due to:     [x] Monitor between session response to: treatment    Electronically signed by:  Apple Burkett, [facility]

## 2018-10-30 ENCOUNTER — TREATMENT (OUTPATIENT)
Dept: PHYSICAL THERAPY | Age: 68
End: 2018-10-30
Payer: MEDICARE

## 2018-10-30 DIAGNOSIS — S62.304D OPEN DISPLACED FRACTURE OF FOURTH METACARPAL BONE OF RIGHT HAND WITH ROUTINE HEALING, UNSPECIFIED PORTION OF METACARPAL, SUBSEQUENT ENCOUNTER: Primary | ICD-10-CM

## 2018-10-30 DIAGNOSIS — S43.004D DISLOCATION OF RIGHT SHOULDER JOINT, SUBSEQUENT ENCOUNTER: ICD-10-CM

## 2018-10-30 DIAGNOSIS — M25.611 DECREASED RANGE OF MOTION OF RIGHT SHOULDER: ICD-10-CM

## 2018-10-30 DIAGNOSIS — M25.641 JOINT STIFFNESS OF HAND, RIGHT: ICD-10-CM

## 2018-10-30 DIAGNOSIS — M62.541 MUSCLE WASTING AND ATROPHY, NOT ELSEWHERE CLASSIFIED, RIGHT HAND: ICD-10-CM

## 2018-10-30 DIAGNOSIS — M62.511 MUSCLE WASTING AND ATROPHY, NOT ELSEWHERE CLASSIFIED, RIGHT SHOULDER: ICD-10-CM

## 2018-10-30 PROCEDURE — 97112 NEUROMUSCULAR REEDUCATION: CPT

## 2018-10-30 PROCEDURE — 97150 GROUP THERAPEUTIC PROCEDURES: CPT

## 2018-10-30 PROCEDURE — 97110 THERAPEUTIC EXERCISES: CPT

## 2018-11-01 ENCOUNTER — TREATMENT (OUTPATIENT)
Dept: PHYSICAL THERAPY | Age: 68
End: 2018-11-01
Payer: MEDICARE

## 2018-11-01 DIAGNOSIS — S62.304D OPEN DISPLACED FRACTURE OF FOURTH METACARPAL BONE OF RIGHT HAND WITH ROUTINE HEALING, UNSPECIFIED PORTION OF METACARPAL, SUBSEQUENT ENCOUNTER: Primary | ICD-10-CM

## 2018-11-01 DIAGNOSIS — M25.641 JOINT STIFFNESS OF HAND, RIGHT: ICD-10-CM

## 2018-11-01 DIAGNOSIS — M25.611 DECREASED RANGE OF MOTION OF RIGHT SHOULDER: ICD-10-CM

## 2018-11-01 DIAGNOSIS — M62.541 MUSCLE WASTING AND ATROPHY, NOT ELSEWHERE CLASSIFIED, RIGHT HAND: ICD-10-CM

## 2018-11-01 DIAGNOSIS — M62.511 MUSCLE WASTING AND ATROPHY, NOT ELSEWHERE CLASSIFIED, RIGHT SHOULDER: ICD-10-CM

## 2018-11-01 DIAGNOSIS — S43.004D DISLOCATION OF RIGHT SHOULDER JOINT, SUBSEQUENT ENCOUNTER: ICD-10-CM

## 2018-11-01 PROCEDURE — 97110 THERAPEUTIC EXERCISES: CPT | Performed by: PHYSICAL THERAPIST

## 2018-11-01 PROCEDURE — 97150 GROUP THERAPEUTIC PROCEDURES: CPT | Performed by: PHYSICAL THERAPIST

## 2018-11-06 ENCOUNTER — TREATMENT (OUTPATIENT)
Dept: PHYSICAL THERAPY | Age: 68
End: 2018-11-06
Payer: MEDICARE

## 2018-11-06 DIAGNOSIS — S43.004D DISLOCATION OF RIGHT SHOULDER JOINT, SUBSEQUENT ENCOUNTER: ICD-10-CM

## 2018-11-06 DIAGNOSIS — S62.304D OPEN DISPLACED FRACTURE OF FOURTH METACARPAL BONE OF RIGHT HAND WITH ROUTINE HEALING, UNSPECIFIED PORTION OF METACARPAL, SUBSEQUENT ENCOUNTER: Primary | ICD-10-CM

## 2018-11-06 DIAGNOSIS — M62.541 MUSCLE WASTING AND ATROPHY, NOT ELSEWHERE CLASSIFIED, RIGHT HAND: ICD-10-CM

## 2018-11-06 DIAGNOSIS — M25.641 JOINT STIFFNESS OF HAND, RIGHT: ICD-10-CM

## 2018-11-06 DIAGNOSIS — M25.611 DECREASED RANGE OF MOTION OF RIGHT SHOULDER: ICD-10-CM

## 2018-11-06 DIAGNOSIS — M62.511 MUSCLE WASTING AND ATROPHY, NOT ELSEWHERE CLASSIFIED, RIGHT SHOULDER: ICD-10-CM

## 2018-11-06 PROCEDURE — 97110 THERAPEUTIC EXERCISES: CPT

## 2018-11-06 PROCEDURE — 97150 GROUP THERAPEUTIC PROCEDURES: CPT

## 2018-11-09 ENCOUNTER — TREATMENT (OUTPATIENT)
Dept: PHYSICAL THERAPY | Age: 68
End: 2018-11-09
Payer: MEDICARE

## 2018-11-09 DIAGNOSIS — S43.004D DISLOCATION OF RIGHT SHOULDER JOINT, SUBSEQUENT ENCOUNTER: ICD-10-CM

## 2018-11-09 DIAGNOSIS — M25.611 DECREASED RANGE OF MOTION OF RIGHT SHOULDER: ICD-10-CM

## 2018-11-09 DIAGNOSIS — S62.304D OPEN DISPLACED FRACTURE OF FOURTH METACARPAL BONE OF RIGHT HAND WITH ROUTINE HEALING, UNSPECIFIED PORTION OF METACARPAL, SUBSEQUENT ENCOUNTER: Primary | ICD-10-CM

## 2018-11-09 DIAGNOSIS — M25.641 JOINT STIFFNESS OF HAND, RIGHT: ICD-10-CM

## 2018-11-09 DIAGNOSIS — M62.511 MUSCLE WASTING AND ATROPHY, NOT ELSEWHERE CLASSIFIED, RIGHT SHOULDER: ICD-10-CM

## 2018-11-09 DIAGNOSIS — M62.541 MUSCLE WASTING AND ATROPHY, NOT ELSEWHERE CLASSIFIED, RIGHT HAND: ICD-10-CM

## 2018-11-09 PROCEDURE — G8984 CARRY CURRENT STATUS: HCPCS

## 2018-11-09 PROCEDURE — 97150 GROUP THERAPEUTIC PROCEDURES: CPT

## 2018-11-09 PROCEDURE — G8985 CARRY GOAL STATUS: HCPCS

## 2018-11-09 PROCEDURE — 97110 THERAPEUTIC EXERCISES: CPT

## 2018-11-09 NOTE — PROGRESS NOTES
RE-EVALUATION: [x]       DISCHARGE: []     Date:  2018   Patient: Lena Shipman  : 1950  MRN: [id number]  Physician: Celine Oliver MD  [address] [place] [address]        Medical Diagnosis: Open displaced fracture of fourth metacarpal bone of right hand with routine healing, unspecified portion of metacarpal, subsequent encounter [S62.304D]  Treatment Diagnosis:   Visit Diagnoses       Codes    Open displaced fracture of fourth metacarpal bone of right hand with routine healing, unspecified portion of metacarpal, subsequent encounter    -  Primary S62.304D    Decreased range of motion of right shoulder     M25.611    Muscle wasting and atrophy, not elsewhere classified, right shoulder     M62.511    Muscle wasting and atrophy, not elsewhere classified, right hand     M62.541    Joint stiffness of hand, right     M25.641              SUBJECTIVE:       Past Medical History:   Diagnosis Date    AC (acromioclavicular) arthritis     right shoulder    Diverticulitis     Esophagitis     GERD (gastroesophageal reflux disease)     Hiatal hernia     Hypertension     Hypothyroid     IBS (irritable bowel syndrome)     Impingement syndrome of right shoulder     right shoulder     Past Surgical History:   Procedure Laterality Date    ABDOMEN SURGERY      ABDOMINOPLASTY      BRAIN AVM REPAIR      BRAIN AVM REPAIR Left     BREAST REDUCTION SURGERY      BUNIONECTOMY Bilateral     FRACTURE SURGERY      OVARY REMOVAL      RHINOPLASTY      SHOULDER ARTHROSCOPY Right 2015    SA decomp w/Michelle resect of dist clavicle/Inj for post-op pain HARISH Cotter MD    TONSILLECTOMY      VASCULAR SURGERY           Change since onset of treatment: Reports improvement in grasping, eating and dressing.      Pain:  Current 7/10 Best  5/10  Worst 9/10    Type/Quality/Location/Behavior:  Constant/variable    Current ADL/Functional Status: Patient is dressing modified independence; Can grasp and bring

## 2018-11-14 ENCOUNTER — OFFICE VISIT (OUTPATIENT)
Dept: ORTHOPEDIC SURGERY | Age: 68
End: 2018-11-14

## 2018-11-14 VITALS
SYSTOLIC BLOOD PRESSURE: 122 MMHG | TEMPERATURE: 97.8 F | BODY MASS INDEX: 26.8 KG/M2 | WEIGHT: 157 LBS | HEIGHT: 64 IN | DIASTOLIC BLOOD PRESSURE: 72 MMHG | HEART RATE: 52 BPM

## 2018-11-14 DIAGNOSIS — S43.004D DISLOCATION OF RIGHT SHOULDER JOINT, SUBSEQUENT ENCOUNTER: Primary | ICD-10-CM

## 2018-11-14 DIAGNOSIS — S62.304D CLOSED NONDISPLACED FRACTURE OF FOURTH METACARPAL BONE OF RIGHT HAND WITH ROUTINE HEALING, UNSPECIFIED PORTION OF METACARPAL, SUBSEQUENT ENCOUNTER: ICD-10-CM

## 2018-11-14 PROCEDURE — 99024 POSTOP FOLLOW-UP VISIT: CPT | Performed by: ORTHOPAEDIC SURGERY
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
## 2018-11-15 ENCOUNTER — TREATMENT (OUTPATIENT)
Dept: PHYSICAL THERAPY | Age: 68
End: 2018-11-15
Payer: MEDICARE

## 2018-11-15 DIAGNOSIS — S43.004D DISLOCATION OF RIGHT SHOULDER JOINT, SUBSEQUENT ENCOUNTER: ICD-10-CM

## 2018-11-15 DIAGNOSIS — M62.541 MUSCLE WASTING AND ATROPHY, NOT ELSEWHERE CLASSIFIED, RIGHT HAND: ICD-10-CM

## 2018-11-15 DIAGNOSIS — M25.641 JOINT STIFFNESS OF HAND, RIGHT: ICD-10-CM

## 2018-11-15 DIAGNOSIS — M25.611 DECREASED RANGE OF MOTION OF RIGHT SHOULDER: ICD-10-CM

## 2018-11-15 DIAGNOSIS — S62.304D OPEN DISPLACED FRACTURE OF FOURTH METACARPAL BONE OF RIGHT HAND WITH ROUTINE HEALING, UNSPECIFIED PORTION OF METACARPAL, SUBSEQUENT ENCOUNTER: Primary | ICD-10-CM

## 2018-11-15 DIAGNOSIS — M62.511 MUSCLE WASTING AND ATROPHY, NOT ELSEWHERE CLASSIFIED, RIGHT SHOULDER: ICD-10-CM

## 2018-11-15 PROCEDURE — 97112 NEUROMUSCULAR REEDUCATION: CPT | Performed by: PHYSICAL THERAPIST

## 2018-11-15 PROCEDURE — 97110 THERAPEUTIC EXERCISES: CPT | Performed by: PHYSICAL THERAPIST

## 2018-11-20 ENCOUNTER — TREATMENT (OUTPATIENT)
Dept: PHYSICAL THERAPY | Age: 68
End: 2018-11-20
Payer: MEDICARE

## 2018-11-20 DIAGNOSIS — M62.541 MUSCLE WASTING AND ATROPHY, NOT ELSEWHERE CLASSIFIED, RIGHT HAND: ICD-10-CM

## 2018-11-20 DIAGNOSIS — S43.004D DISLOCATION OF RIGHT SHOULDER JOINT, SUBSEQUENT ENCOUNTER: ICD-10-CM

## 2018-11-20 DIAGNOSIS — S62.304D OPEN DISPLACED FRACTURE OF FOURTH METACARPAL BONE OF RIGHT HAND WITH ROUTINE HEALING, UNSPECIFIED PORTION OF METACARPAL, SUBSEQUENT ENCOUNTER: Primary | ICD-10-CM

## 2018-11-20 DIAGNOSIS — M25.611 DECREASED RANGE OF MOTION OF RIGHT SHOULDER: ICD-10-CM

## 2018-11-20 DIAGNOSIS — M62.511 MUSCLE WASTING AND ATROPHY, NOT ELSEWHERE CLASSIFIED, RIGHT SHOULDER: ICD-10-CM

## 2018-11-20 DIAGNOSIS — M25.641 JOINT STIFFNESS OF HAND, RIGHT: ICD-10-CM

## 2018-11-20 PROCEDURE — 97110 THERAPEUTIC EXERCISES: CPT

## 2018-11-20 PROCEDURE — 97140 MANUAL THERAPY 1/> REGIONS: CPT

## 2018-11-21 ENCOUNTER — HOSPITAL ENCOUNTER (OUTPATIENT)
Dept: CT IMAGING | Age: 68
Discharge: HOME | End: 2018-11-23
Payer: MEDICARE

## 2018-11-21 ENCOUNTER — HOSPITAL ENCOUNTER (OUTPATIENT)
Dept: MRI IMAGING | Age: 68
Discharge: HOME | End: 2018-11-23
Payer: MEDICARE

## 2018-11-21 DIAGNOSIS — S06.5XAA SUBDURAL HEMATOMA: ICD-10-CM

## 2018-11-21 DIAGNOSIS — S43.004D DISLOCATION OF RIGHT SHOULDER JOINT, SUBSEQUENT ENCOUNTER: ICD-10-CM

## 2018-11-21 PROCEDURE — 73221 MRI JOINT UPR EXTREM W/O DYE: CPT

## 2018-11-21 PROCEDURE — 70450 CT HEAD/BRAIN W/O DYE: CPT

## 2018-11-26 ENCOUNTER — TREATMENT (OUTPATIENT)
Dept: PHYSICAL THERAPY | Age: 68
End: 2018-11-26
Payer: MEDICARE

## 2018-11-26 DIAGNOSIS — M25.611 DECREASED RANGE OF MOTION OF RIGHT SHOULDER: ICD-10-CM

## 2018-11-26 DIAGNOSIS — S62.304D OPEN DISPLACED FRACTURE OF FOURTH METACARPAL BONE OF RIGHT HAND WITH ROUTINE HEALING, UNSPECIFIED PORTION OF METACARPAL, SUBSEQUENT ENCOUNTER: ICD-10-CM

## 2018-11-26 DIAGNOSIS — S43.004D DISLOCATION OF RIGHT SHOULDER JOINT, SUBSEQUENT ENCOUNTER: Primary | ICD-10-CM

## 2018-11-26 DIAGNOSIS — M62.511 MUSCLE WASTING AND ATROPHY, NOT ELSEWHERE CLASSIFIED, RIGHT SHOULDER: ICD-10-CM

## 2018-11-26 PROCEDURE — 97140 MANUAL THERAPY 1/> REGIONS: CPT | Performed by: PHYSICAL THERAPIST

## 2018-11-26 PROCEDURE — G8985 CARRY GOAL STATUS: HCPCS | Performed by: PHYSICAL THERAPIST

## 2018-11-26 PROCEDURE — 97164 PT RE-EVAL EST PLAN CARE: CPT | Performed by: PHYSICAL THERAPIST

## 2018-11-26 PROCEDURE — 97110 THERAPEUTIC EXERCISES: CPT | Performed by: PHYSICAL THERAPIST

## 2018-11-26 PROCEDURE — G8984 CARRY CURRENT STATUS: HCPCS | Performed by: PHYSICAL THERAPIST

## 2018-11-26 NOTE — PROGRESS NOTES
RE-EVALUATION: [x]       DISCHARGE: []     Date:  2018   Patient: Enrico Castano  : 1950  MRN: [id number]  Physician: Lawyer Abbie MD  [address] [place] [address]        Medical Diagnosis: W30.163N (ICD-10-CM) - Closed nondisplaced fracture of fourth metacarpal bone of right hand with routine healing, unspecified portion of metacarpal, subsequent encounter S43.004D (ICD-10-CM) - Dislocation of right shoulder joint, subsequent encounter    Treatment Diagnosis:  M25.511, m62.541, m25.641 m25.611        SUBJECTIVE:       Past Medical History:   Diagnosis Date    AC (acromioclavicular) arthritis     right shoulder    Diverticulitis     Esophagitis     GERD (gastroesophageal reflux disease)     Hiatal hernia     Hypertension     Hypothyroid     IBS (irritable bowel syndrome)     Impingement syndrome of right shoulder     right shoulder     Past Surgical History:   Procedure Laterality Date    ABDOMEN SURGERY      ABDOMINOPLASTY      BRAIN AVM REPAIR      BRAIN AVM REPAIR Left     BREAST REDUCTION SURGERY      BUNIONECTOMY Bilateral     FRACTURE SURGERY      OVARY REMOVAL      RHINOPLASTY      SHOULDER ARTHROSCOPY Right 2015    SA decomp w/Michelle resect of dist clavicle/Inj for post-op pain HARISH Cotter MD    TONSILLECTOMY      VASCULAR SURGERY           Change since onset of treatment: Pt able to move her R arm better than a few weeks ago but frustrated that she hasn't progressed more.     Pain:  Current 5/10 Best  4/10  Worst 8/10    Type/Quality/Location/Behavior:  Ache in R shoulder and humerus    Current ADL/Functional Status: MOD I dressing, unable to reach with RUE, unable to lift more than 1-2 lbs with her RUE close to her body, unable to drive    Occupation: Retired  ; Status:    Outcome Measure:   PT G-Codes  Functional Assessment Tool Used: QDASH  Score: 90%  Functional Limitation: Carrying, moving and handling objects  Carrying, Moving and Handling Objects

## 2018-11-30 ENCOUNTER — TREATMENT (OUTPATIENT)
Dept: PHYSICAL THERAPY | Age: 68
End: 2018-11-30
Payer: MEDICARE

## 2018-11-30 DIAGNOSIS — M25.641 JOINT STIFFNESS OF HAND, RIGHT: ICD-10-CM

## 2018-11-30 DIAGNOSIS — S62.304D OPEN DISPLACED FRACTURE OF FOURTH METACARPAL BONE OF RIGHT HAND WITH ROUTINE HEALING, UNSPECIFIED PORTION OF METACARPAL, SUBSEQUENT ENCOUNTER: ICD-10-CM

## 2018-11-30 DIAGNOSIS — M62.541 MUSCLE WASTING AND ATROPHY, NOT ELSEWHERE CLASSIFIED, RIGHT HAND: ICD-10-CM

## 2018-11-30 DIAGNOSIS — M25.611 DECREASED RANGE OF MOTION OF RIGHT SHOULDER: ICD-10-CM

## 2018-11-30 DIAGNOSIS — M62.511 MUSCLE WASTING AND ATROPHY, NOT ELSEWHERE CLASSIFIED, RIGHT SHOULDER: ICD-10-CM

## 2018-11-30 DIAGNOSIS — S43.004D DISLOCATION OF RIGHT SHOULDER JOINT, SUBSEQUENT ENCOUNTER: Primary | ICD-10-CM

## 2018-11-30 PROCEDURE — 97140 MANUAL THERAPY 1/> REGIONS: CPT

## 2018-11-30 PROCEDURE — 97110 THERAPEUTIC EXERCISES: CPT

## 2018-12-03 ENCOUNTER — OFFICE VISIT (OUTPATIENT)
Dept: NEUROSURGERY | Age: 68
End: 2018-12-03
Payer: MEDICARE

## 2018-12-03 VITALS
WEIGHT: 159 LBS | BODY MASS INDEX: 27.14 KG/M2 | SYSTOLIC BLOOD PRESSURE: 144 MMHG | DIASTOLIC BLOOD PRESSURE: 79 MMHG | HEART RATE: 53 BPM | HEIGHT: 64 IN

## 2018-12-03 DIAGNOSIS — I60.9 SAH (SUBARACHNOID HEMORRHAGE) (HCC): ICD-10-CM

## 2018-12-03 DIAGNOSIS — S06.5XAA SUBDURAL HEMATOMA: Primary | ICD-10-CM

## 2018-12-03 PROCEDURE — 99213 OFFICE O/P EST LOW 20 MIN: CPT | Performed by: PHYSICIAN ASSISTANT

## 2018-12-05 ENCOUNTER — TREATMENT (OUTPATIENT)
Dept: PHYSICAL THERAPY | Age: 68
End: 2018-12-05
Payer: MEDICARE

## 2018-12-05 DIAGNOSIS — M62.541 MUSCLE WASTING AND ATROPHY, NOT ELSEWHERE CLASSIFIED, RIGHT HAND: ICD-10-CM

## 2018-12-05 DIAGNOSIS — M25.611 DECREASED RANGE OF MOTION OF RIGHT SHOULDER: ICD-10-CM

## 2018-12-05 DIAGNOSIS — M25.641 JOINT STIFFNESS OF HAND, RIGHT: ICD-10-CM

## 2018-12-05 DIAGNOSIS — S43.004D DISLOCATION OF RIGHT SHOULDER JOINT, SUBSEQUENT ENCOUNTER: Primary | ICD-10-CM

## 2018-12-05 DIAGNOSIS — M62.511 MUSCLE WASTING AND ATROPHY, NOT ELSEWHERE CLASSIFIED, RIGHT SHOULDER: ICD-10-CM

## 2018-12-05 DIAGNOSIS — S62.304D OPEN DISPLACED FRACTURE OF FOURTH METACARPAL BONE OF RIGHT HAND WITH ROUTINE HEALING, UNSPECIFIED PORTION OF METACARPAL, SUBSEQUENT ENCOUNTER: ICD-10-CM

## 2018-12-05 PROCEDURE — G8985 CARRY GOAL STATUS: HCPCS | Performed by: PHYSICAL THERAPIST

## 2018-12-05 PROCEDURE — G8984 CARRY CURRENT STATUS: HCPCS | Performed by: PHYSICAL THERAPIST

## 2018-12-05 PROCEDURE — 97112 NEUROMUSCULAR REEDUCATION: CPT | Performed by: PHYSICAL THERAPIST

## 2018-12-05 PROCEDURE — 97530 THERAPEUTIC ACTIVITIES: CPT | Performed by: PHYSICAL THERAPIST

## 2018-12-07 ENCOUNTER — TELEPHONE (OUTPATIENT)
Dept: ORTHOPEDIC SURGERY | Age: 68
End: 2018-12-07

## 2018-12-07 ENCOUNTER — OFFICE VISIT (OUTPATIENT)
Dept: ORTHOPEDIC SURGERY | Age: 68
End: 2018-12-07
Payer: MEDICARE

## 2018-12-07 VITALS
HEART RATE: 66 BPM | SYSTOLIC BLOOD PRESSURE: 122 MMHG | WEIGHT: 159 LBS | TEMPERATURE: 97.8 F | HEIGHT: 64 IN | BODY MASS INDEX: 27.14 KG/M2 | DIASTOLIC BLOOD PRESSURE: 80 MMHG

## 2018-12-07 DIAGNOSIS — S43.004D DISLOCATION OF RIGHT SHOULDER JOINT, SUBSEQUENT ENCOUNTER: Primary | ICD-10-CM

## 2018-12-07 PROCEDURE — 99213 OFFICE O/P EST LOW 20 MIN: CPT | Performed by: ORTHOPAEDIC SURGERY

## 2019-01-02 ENCOUNTER — OFFICE VISIT (OUTPATIENT)
Dept: ORTHOPEDIC SURGERY | Age: 69
End: 2019-01-02
Payer: MEDICARE

## 2019-01-02 VITALS
HEIGHT: 64 IN | HEART RATE: 88 BPM | BODY MASS INDEX: 27.14 KG/M2 | DIASTOLIC BLOOD PRESSURE: 84 MMHG | SYSTOLIC BLOOD PRESSURE: 150 MMHG | WEIGHT: 159 LBS

## 2019-01-02 DIAGNOSIS — S43.004D DISLOCATION OF RIGHT SHOULDER JOINT, SUBSEQUENT ENCOUNTER: ICD-10-CM

## 2019-01-02 DIAGNOSIS — S43.004D DISLOCATION OF RIGHT SHOULDER JOINT, SUBSEQUENT ENCOUNTER: Primary | ICD-10-CM

## 2019-01-02 PROCEDURE — 99213 OFFICE O/P EST LOW 20 MIN: CPT | Performed by: ORTHOPAEDIC SURGERY

## 2019-01-18 ENCOUNTER — HOSPITAL ENCOUNTER (OUTPATIENT)
Dept: CT IMAGING | Age: 69
Discharge: HOME | End: 2019-01-20
Payer: MEDICARE

## 2019-01-18 DIAGNOSIS — I60.9 SAH (SUBARACHNOID HEMORRHAGE) (HCC): ICD-10-CM

## 2019-01-18 DIAGNOSIS — S06.5XAA SUBDURAL HEMATOMA: ICD-10-CM

## 2019-01-18 PROCEDURE — 70450 CT HEAD/BRAIN W/O DYE: CPT

## 2019-01-21 ENCOUNTER — TELEPHONE (OUTPATIENT)
Dept: NEUROSURGERY | Age: 69
End: 2019-01-21

## 2019-01-23 PROCEDURE — G8986 CARRY D/C STATUS: HCPCS | Performed by: PHYSICAL THERAPIST

## 2019-01-23 NOTE — PROGRESS NOTES
DISCHARGE:      Date:  2018   Patient: Dale Holt  : 1950  MRN: [id number]  Physician: Herlinda Griffith MD  [address] [place] [address]        Medical Diagnosis: Dislocation of right shoulder joint, subsequent encounter [S43.004D]  Treatment Diagnosis:   Visit Diagnoses       Codes    Open displaced fracture of fourth metacarpal bone of right hand with routine healing, unspecified portion of metacarpal, subsequent encounter     S62.304D    Decreased range of motion of right shoulder     M25.611    Muscle wasting and atrophy, not elsewhere classified, right shoulder     M62.511    Muscle wasting and atrophy, not elsewhere classified, right hand     M62.541    Joint stiffness of hand, right     M25.641              SUBJECTIVE:       Past Medical History:   Diagnosis Date    AC (acromioclavicular) arthritis     right shoulder    Diverticulitis     Esophagitis     GERD (gastroesophageal reflux disease)     Hiatal hernia     Hypertension     Hypothyroid     IBS (irritable bowel syndrome)     Impingement syndrome of right shoulder     right shoulder     Past Surgical History:   Procedure Laterality Date    ABDOMEN SURGERY      ABDOMINOPLASTY      BRAIN AVM REPAIR      BRAIN AVM REPAIR Left     BREAST REDUCTION SURGERY      BUNIONECTOMY Bilateral     FRACTURE SURGERY      OVARY REMOVAL      RHINOPLASTY      SHOULDER ARTHROSCOPY Right 2015    SA decomp w/Michelle resect of dist clavicle/Inj for post-op pain HARISH Cotter MD    TONSILLECTOMY      VASCULAR SURGERY     Refer to 18 re-eval for details. Outcome Measure: PT G-Codes  Functional Assessment Tool Used: QDASH  Score: 69%  Functional Limitation: Carrying, moving and handling objects  Carrying, Moving and Handling Objects Current Status (): At least 60 percent but less than 80 percent impaired, limited or restricted  Carrying, Moving and Handling Objects Goal Status ():  At least 20 percent but less than

## 2019-01-24 ENCOUNTER — TELEPHONE (OUTPATIENT)
Dept: ORTHOPEDIC SURGERY | Age: 69
End: 2019-01-24

## 2019-01-30 ENCOUNTER — OFFICE VISIT (OUTPATIENT)
Dept: ORTHOPEDIC SURGERY | Age: 69
End: 2019-01-30
Payer: MEDICARE

## 2019-01-30 VITALS
HEART RATE: 61 BPM | DIASTOLIC BLOOD PRESSURE: 79 MMHG | SYSTOLIC BLOOD PRESSURE: 147 MMHG | TEMPERATURE: 97.6 F | WEIGHT: 154 LBS | HEIGHT: 62 IN | BODY MASS INDEX: 28.34 KG/M2

## 2019-01-30 DIAGNOSIS — Z01.818 PRE-OP EXAM: Primary | ICD-10-CM

## 2019-01-30 DIAGNOSIS — S43.004D DISLOCATION OF RIGHT SHOULDER JOINT, SUBSEQUENT ENCOUNTER: ICD-10-CM

## 2019-01-30 DIAGNOSIS — M75.101 TEAR OF RIGHT ROTATOR CUFF, UNSPECIFIED TEAR EXTENT: ICD-10-CM

## 2019-01-30 PROCEDURE — 99213 OFFICE O/P EST LOW 20 MIN: CPT | Performed by: ORTHOPAEDIC SURGERY

## 2019-01-30 RX ORDER — KETOROLAC TROMETHAMINE 10 MG/1
10 TABLET, FILM COATED ORAL EVERY 6 HOURS PRN
Qty: 8 TABLET | Refills: 0 | Status: SHIPPED | OUTPATIENT
Start: 2019-01-30 | End: 2019-02-08

## 2019-01-30 RX ORDER — HYDROCODONE BITARTRATE AND ACETAMINOPHEN 5; 325 MG/1; MG/1
1 TABLET ORAL EVERY 6 HOURS PRN
Qty: 28 TABLET | Refills: 0 | Status: SHIPPED | OUTPATIENT
Start: 2019-01-30 | End: 2019-02-06

## 2019-01-31 ENCOUNTER — HOSPITAL ENCOUNTER (OUTPATIENT)
Age: 69
Setting detail: OUTPATIENT SURGERY
Discharge: HOME | End: 2019-01-31
Attending: ORTHOPAEDIC SURGERY | Admitting: ORTHOPAEDIC SURGERY
Payer: MEDICARE

## 2019-01-31 ENCOUNTER — ANESTHESIA EVENT (OUTPATIENT)
Dept: OPERATING ROOM | Age: 69
End: 2019-01-31
Payer: MEDICARE

## 2019-01-31 ENCOUNTER — ANESTHESIA (OUTPATIENT)
Dept: OPERATING ROOM | Age: 69
End: 2019-01-31
Payer: MEDICARE

## 2019-01-31 VITALS
HEIGHT: 60 IN | OXYGEN SATURATION: 98 % | DIASTOLIC BLOOD PRESSURE: 69 MMHG | BODY MASS INDEX: 30.23 KG/M2 | RESPIRATION RATE: 16 BRPM | TEMPERATURE: 96.8 F | HEART RATE: 91 BPM | SYSTOLIC BLOOD PRESSURE: 146 MMHG | WEIGHT: 154 LBS

## 2019-01-31 VITALS
SYSTOLIC BLOOD PRESSURE: 115 MMHG | OXYGEN SATURATION: 100 % | RESPIRATION RATE: 1 BRPM | DIASTOLIC BLOOD PRESSURE: 65 MMHG

## 2019-01-31 PROCEDURE — 2500000003 HC RX 250 WO HCPCS: Performed by: ORTHOPAEDIC SURGERY

## 2019-01-31 PROCEDURE — L3650 SO 8 ABD RESTRAINT PRE OTS: HCPCS | Performed by: ORTHOPAEDIC SURGERY

## 2019-01-31 PROCEDURE — 7100000011 HC PHASE II RECOVERY - ADDTL 15 MIN: Performed by: ORTHOPAEDIC SURGERY

## 2019-01-31 PROCEDURE — 3700000001 HC ADD 15 MINUTES (ANESTHESIA): Performed by: ORTHOPAEDIC SURGERY

## 2019-01-31 PROCEDURE — 6360000002 HC RX W HCPCS: Performed by: NURSE ANESTHETIST, CERTIFIED REGISTERED

## 2019-01-31 PROCEDURE — 2720000010 HC SURG SUPPLY STERILE: Performed by: ORTHOPAEDIC SURGERY

## 2019-01-31 PROCEDURE — 2500000003 HC RX 250 WO HCPCS: Performed by: NURSE ANESTHETIST, CERTIFIED REGISTERED

## 2019-01-31 PROCEDURE — 29828 SHO ARTHRS SRG BICP TENODSIS: CPT | Performed by: ORTHOPAEDIC SURGERY

## 2019-01-31 PROCEDURE — 6370000000 HC RX 637 (ALT 250 FOR IP): Performed by: ANESTHESIOLOGY

## 2019-01-31 PROCEDURE — 6360000002 HC RX W HCPCS: Performed by: ORTHOPAEDIC SURGERY

## 2019-01-31 PROCEDURE — 3600000014 HC SURGERY LEVEL 4 ADDTL 15MIN: Performed by: ORTHOPAEDIC SURGERY

## 2019-01-31 PROCEDURE — 3700000000 HC ANESTHESIA ATTENDED CARE: Performed by: ORTHOPAEDIC SURGERY

## 2019-01-31 PROCEDURE — 6360000002 HC RX W HCPCS: Performed by: ANESTHESIOLOGY

## 2019-01-31 PROCEDURE — 2500000003 HC RX 250 WO HCPCS: Performed by: ANESTHESIOLOGY

## 2019-01-31 PROCEDURE — C1713 ANCHOR/SCREW BN/BN,TIS/BN: HCPCS | Performed by: ORTHOPAEDIC SURGERY

## 2019-01-31 PROCEDURE — 64415 NJX AA&/STRD BRCH PLXS IMG: CPT | Performed by: ANESTHESIOLOGY

## 2019-01-31 PROCEDURE — 29827 SHO ARTHRS SRG RT8TR CUF RPR: CPT | Performed by: ORTHOPAEDIC SURGERY

## 2019-01-31 PROCEDURE — 7100000001 HC PACU RECOVERY - ADDTL 15 MIN: Performed by: ORTHOPAEDIC SURGERY

## 2019-01-31 PROCEDURE — 2580000003 HC RX 258: Performed by: ORTHOPAEDIC SURGERY

## 2019-01-31 PROCEDURE — 2709999900 HC NON-CHARGEABLE SUPPLY: Performed by: ORTHOPAEDIC SURGERY

## 2019-01-31 PROCEDURE — 7100000010 HC PHASE II RECOVERY - FIRST 15 MIN: Performed by: ORTHOPAEDIC SURGERY

## 2019-01-31 PROCEDURE — 7100000000 HC PACU RECOVERY - FIRST 15 MIN: Performed by: ORTHOPAEDIC SURGERY

## 2019-01-31 PROCEDURE — L3999 UPPER LIMB ORTHOSIS NOS: HCPCS | Performed by: ORTHOPAEDIC SURGERY

## 2019-01-31 PROCEDURE — 3600000004 HC SURGERY LEVEL 4 BASE: Performed by: ORTHOPAEDIC SURGERY

## 2019-01-31 DEVICE — ANCHOR SUTURE 4.75X19.1 MM TIG TAPE LOOP WHT BLK PUSHLOCK: Type: IMPLANTABLE DEVICE | Site: SHOULDER | Status: FUNCTIONAL

## 2019-01-31 DEVICE — ANCHOR SUTURE BIOCOMP 4.75X22 MM DBL LD WHT SWIVELOCK C: Type: IMPLANTABLE DEVICE | Site: SHOULDER | Status: FUNCTIONAL

## 2019-01-31 DEVICE — ANCHOR SUT L19.1MM DIA4.75MM BIOCOMPOSITE FULL THRD: Type: IMPLANTABLE DEVICE | Site: SHOULDER | Status: FUNCTIONAL

## 2019-01-31 RX ORDER — MIDAZOLAM HYDROCHLORIDE 1 MG/ML
0.5 INJECTION INTRAMUSCULAR; INTRAVENOUS PRN
Status: DISCONTINUED | OUTPATIENT
Start: 2019-01-31 | End: 2019-01-31 | Stop reason: HOSPADM

## 2019-01-31 RX ORDER — ROCURONIUM BROMIDE 10 MG/ML
INJECTION, SOLUTION INTRAVENOUS PRN
Status: DISCONTINUED | OUTPATIENT
Start: 2019-01-31 | End: 2019-01-31 | Stop reason: SDUPTHER

## 2019-01-31 RX ORDER — SODIUM CHLORIDE 9 MG/ML
INJECTION, SOLUTION INTRAVENOUS CONTINUOUS
Status: DISCONTINUED | OUTPATIENT
Start: 2019-01-31 | End: 2019-01-31 | Stop reason: HOSPADM

## 2019-01-31 RX ORDER — EPHEDRINE SULFATE/0.9% NACL/PF 50 MG/5 ML
SYRINGE (ML) INTRAVENOUS PRN
Status: DISCONTINUED | OUTPATIENT
Start: 2019-01-31 | End: 2019-01-31 | Stop reason: SDUPTHER

## 2019-01-31 RX ORDER — DIPHENHYDRAMINE HYDROCHLORIDE 50 MG/ML
12.5 INJECTION INTRAMUSCULAR; INTRAVENOUS
Status: DISCONTINUED | OUTPATIENT
Start: 2019-01-31 | End: 2019-01-31 | Stop reason: HOSPADM

## 2019-01-31 RX ORDER — SODIUM CHLORIDE 0.9 % (FLUSH) 0.9 %
10 SYRINGE (ML) INJECTION PRN
Status: DISCONTINUED | OUTPATIENT
Start: 2019-01-31 | End: 2019-01-31 | Stop reason: HOSPADM

## 2019-01-31 RX ORDER — CEFAZOLIN SODIUM 2 G/50ML
2 SOLUTION INTRAVENOUS
Status: COMPLETED | OUTPATIENT
Start: 2019-01-31 | End: 2019-01-31

## 2019-01-31 RX ORDER — FENTANYL CITRATE 50 UG/ML
25 INJECTION, SOLUTION INTRAMUSCULAR; INTRAVENOUS EVERY 5 MIN PRN
Status: DISCONTINUED | OUTPATIENT
Start: 2019-01-31 | End: 2019-01-31 | Stop reason: HOSPADM

## 2019-01-31 RX ORDER — PROMETHAZINE HYDROCHLORIDE 25 MG/ML
6.25 INJECTION, SOLUTION INTRAMUSCULAR; INTRAVENOUS PRN
Status: DISCONTINUED | OUTPATIENT
Start: 2019-01-31 | End: 2019-01-31 | Stop reason: HOSPADM

## 2019-01-31 RX ORDER — DEXAMETHASONE SODIUM PHOSPHATE 10 MG/ML
4 INJECTION, SOLUTION INTRAMUSCULAR; INTRAVENOUS ONCE
Status: COMPLETED | OUTPATIENT
Start: 2019-01-31 | End: 2019-01-31

## 2019-01-31 RX ORDER — DEXAMETHASONE SODIUM PHOSPHATE 4 MG/ML
INJECTION, SOLUTION INTRA-ARTICULAR; INTRALESIONAL; INTRAMUSCULAR; INTRAVENOUS; SOFT TISSUE PRN
Status: DISCONTINUED | OUTPATIENT
Start: 2019-01-31 | End: 2019-01-31 | Stop reason: SDUPTHER

## 2019-01-31 RX ORDER — FENTANYL CITRATE 50 UG/ML
25 INJECTION, SOLUTION INTRAMUSCULAR; INTRAVENOUS PRN
Status: DISCONTINUED | OUTPATIENT
Start: 2019-01-31 | End: 2019-01-31 | Stop reason: HOSPADM

## 2019-01-31 RX ORDER — MEPERIDINE HYDROCHLORIDE 25 MG/ML
12.5 INJECTION INTRAMUSCULAR; INTRAVENOUS; SUBCUTANEOUS EVERY 10 MIN PRN
Status: DISCONTINUED | OUTPATIENT
Start: 2019-01-31 | End: 2019-01-31 | Stop reason: HOSPADM

## 2019-01-31 RX ORDER — EPINEPHRINE 1 MG/ML
INJECTION, SOLUTION, CONCENTRATE INTRAVENOUS PRN
Status: DISCONTINUED | OUTPATIENT
Start: 2019-01-31 | End: 2019-01-31 | Stop reason: HOSPADM

## 2019-01-31 RX ORDER — LIDOCAINE HYDROCHLORIDE 10 MG/ML
10 INJECTION, SOLUTION INFILTRATION; PERINEURAL
Status: COMPLETED | OUTPATIENT
Start: 2019-01-31 | End: 2019-01-31

## 2019-01-31 RX ORDER — PROPOFOL 10 MG/ML
INJECTION, EMULSION INTRAVENOUS PRN
Status: DISCONTINUED | OUTPATIENT
Start: 2019-01-31 | End: 2019-01-31 | Stop reason: SDUPTHER

## 2019-01-31 RX ORDER — SODIUM CHLORIDE 0.9 % (FLUSH) 0.9 %
10 SYRINGE (ML) INJECTION EVERY 12 HOURS SCHEDULED
Status: DISCONTINUED | OUTPATIENT
Start: 2019-01-31 | End: 2019-01-31 | Stop reason: HOSPADM

## 2019-01-31 RX ORDER — NEOSTIGMINE METHYLSULFATE 1 MG/ML
INJECTION, SOLUTION INTRAVENOUS PRN
Status: DISCONTINUED | OUTPATIENT
Start: 2019-01-31 | End: 2019-01-31 | Stop reason: SDUPTHER

## 2019-01-31 RX ORDER — ONDANSETRON 2 MG/ML
INJECTION INTRAMUSCULAR; INTRAVENOUS PRN
Status: DISCONTINUED | OUTPATIENT
Start: 2019-01-31 | End: 2019-01-31 | Stop reason: SDUPTHER

## 2019-01-31 RX ORDER — OXYCODONE HYDROCHLORIDE AND ACETAMINOPHEN 5; 325 MG/1; MG/1
1 TABLET ORAL PRN
Status: COMPLETED | OUTPATIENT
Start: 2019-01-31 | End: 2019-01-31

## 2019-01-31 RX ORDER — LIDOCAINE HYDROCHLORIDE 20 MG/ML
INJECTION, SOLUTION EPIDURAL; INFILTRATION; INTRACAUDAL; PERINEURAL PRN
Status: DISCONTINUED | OUTPATIENT
Start: 2019-01-31 | End: 2019-01-31 | Stop reason: SDUPTHER

## 2019-01-31 RX ORDER — GLYCOPYRROLATE 0.2 MG/ML
INJECTION INTRAMUSCULAR; INTRAVENOUS PRN
Status: DISCONTINUED | OUTPATIENT
Start: 2019-01-31 | End: 2019-01-31 | Stop reason: SDUPTHER

## 2019-01-31 RX ORDER — ROPIVACAINE HYDROCHLORIDE 5 MG/ML
30 INJECTION, SOLUTION EPIDURAL; INFILTRATION; PERINEURAL
Status: COMPLETED | OUTPATIENT
Start: 2019-01-31 | End: 2019-01-31

## 2019-01-31 RX ORDER — MIDAZOLAM HYDROCHLORIDE 1 MG/ML
INJECTION INTRAMUSCULAR; INTRAVENOUS PRN
Status: DISCONTINUED | OUTPATIENT
Start: 2019-01-31 | End: 2019-01-31 | Stop reason: SDUPTHER

## 2019-01-31 RX ORDER — FENTANYL CITRATE 50 UG/ML
INJECTION, SOLUTION INTRAMUSCULAR; INTRAVENOUS PRN
Status: DISCONTINUED | OUTPATIENT
Start: 2019-01-31 | End: 2019-01-31 | Stop reason: SDUPTHER

## 2019-01-31 RX ADMIN — SODIUM CHLORIDE: 9 INJECTION, SOLUTION INTRAVENOUS at 11:13

## 2019-01-31 RX ADMIN — MIDAZOLAM HYDROCHLORIDE 1 MG: 1 INJECTION, SOLUTION INTRAMUSCULAR; INTRAVENOUS at 10:48

## 2019-01-31 RX ADMIN — GLYCOPYRROLATE 0.2 MG: 0.2 INJECTION, SOLUTION INTRAMUSCULAR; INTRAVENOUS at 14:20

## 2019-01-31 RX ADMIN — Medication 10 MG: at 13:10

## 2019-01-31 RX ADMIN — FENTANYL CITRATE 50 MCG: 50 INJECTION, SOLUTION INTRAMUSCULAR; INTRAVENOUS at 10:48

## 2019-01-31 RX ADMIN — SODIUM CHLORIDE: 9 INJECTION, SOLUTION INTRAVENOUS at 13:00

## 2019-01-31 RX ADMIN — PROPOFOL 125 MG: 10 INJECTION, EMULSION INTRAVENOUS at 11:50

## 2019-01-31 RX ADMIN — ONDANSETRON HYDROCHLORIDE 4 MG: 2 INJECTION, SOLUTION INTRAMUSCULAR; INTRAVENOUS at 12:35

## 2019-01-31 RX ADMIN — LIDOCAINE HYDROCHLORIDE 10 ML: 10 INJECTION, SOLUTION INFILTRATION; PERINEURAL at 10:58

## 2019-01-31 RX ADMIN — MIDAZOLAM HYDROCHLORIDE 2 MG: 1 INJECTION, SOLUTION INTRAMUSCULAR; INTRAVENOUS at 11:46

## 2019-01-31 RX ADMIN — MIDAZOLAM HYDROCHLORIDE 0.5 MG: 1 INJECTION, SOLUTION INTRAMUSCULAR; INTRAVENOUS at 10:52

## 2019-01-31 RX ADMIN — FENTANYL CITRATE 25 MCG: 50 INJECTION, SOLUTION INTRAMUSCULAR; INTRAVENOUS at 10:52

## 2019-01-31 RX ADMIN — LIDOCAINE HYDROCHLORIDE 50 MG: 20 INJECTION, SOLUTION EPIDURAL; INFILTRATION; INTRACAUDAL; PERINEURAL at 11:50

## 2019-01-31 RX ADMIN — Medication 5 MG: at 12:35

## 2019-01-31 RX ADMIN — DEXAMETHASONE SODIUM PHOSPHATE 4 MG: 10 INJECTION, SOLUTION INTRAMUSCULAR; INTRAVENOUS at 10:59

## 2019-01-31 RX ADMIN — CEFAZOLIN SODIUM 2 G: 2 SOLUTION INTRAVENOUS at 11:46

## 2019-01-31 RX ADMIN — MIDAZOLAM HYDROCHLORIDE 0.5 MG: 1 INJECTION, SOLUTION INTRAMUSCULAR; INTRAVENOUS at 10:58

## 2019-01-31 RX ADMIN — Medication 3 MG: at 14:20

## 2019-01-31 RX ADMIN — OXYCODONE AND ACETAMINOPHEN 1 TABLET: 5; 325 TABLET ORAL at 15:26

## 2019-01-31 RX ADMIN — DEXAMETHASONE SODIUM PHOSPHATE 10 MG: 4 INJECTION, SOLUTION INTRAMUSCULAR; INTRAVENOUS at 12:35

## 2019-01-31 RX ADMIN — ROCURONIUM BROMIDE 30 MG: 10 SOLUTION INTRAVENOUS at 11:50

## 2019-01-31 RX ADMIN — Medication 10 MG: at 13:58

## 2019-01-31 RX ADMIN — FENTANYL CITRATE 25 MCG: 50 INJECTION, SOLUTION INTRAMUSCULAR; INTRAVENOUS at 10:58

## 2019-01-31 RX ADMIN — ROPIVACAINE HYDROCHLORIDE 30 ML: 5 INJECTION, SOLUTION EPIDURAL; INFILTRATION; PERINEURAL at 10:59

## 2019-01-31 RX ADMIN — FENTANYL CITRATE 50 MCG: 50 INJECTION, SOLUTION INTRAMUSCULAR; INTRAVENOUS at 11:55

## 2019-01-31 RX ADMIN — ROCURONIUM BROMIDE 20 MG: 10 SOLUTION INTRAVENOUS at 12:05

## 2019-01-31 RX ADMIN — FENTANYL CITRATE 50 MCG: 50 INJECTION, SOLUTION INTRAMUSCULAR; INTRAVENOUS at 11:50

## 2019-01-31 ASSESSMENT — PULMONARY FUNCTION TESTS
PIF_VALUE: 26
PIF_VALUE: 22
PIF_VALUE: 24
PIF_VALUE: 24
PIF_VALUE: 23
PIF_VALUE: 24
PIF_VALUE: 23
PIF_VALUE: 23
PIF_VALUE: 26
PIF_VALUE: 27
PIF_VALUE: 27
PIF_VALUE: 23
PIF_VALUE: 24
PIF_VALUE: 23
PIF_VALUE: 24
PIF_VALUE: 23
PIF_VALUE: 25
PIF_VALUE: 26
PIF_VALUE: 27
PIF_VALUE: 18
PIF_VALUE: 27
PIF_VALUE: 24
PIF_VALUE: 23
PIF_VALUE: 27
PIF_VALUE: 24
PIF_VALUE: 20
PIF_VALUE: 27
PIF_VALUE: 27
PIF_VALUE: 15
PIF_VALUE: 27
PIF_VALUE: 28
PIF_VALUE: 24
PIF_VALUE: 24
PIF_VALUE: 1
PIF_VALUE: 15
PIF_VALUE: 27
PIF_VALUE: 27
PIF_VALUE: 24
PIF_VALUE: 3
PIF_VALUE: 27
PIF_VALUE: 23
PIF_VALUE: 22
PIF_VALUE: 23
PIF_VALUE: 27
PIF_VALUE: 27
PIF_VALUE: 26
PIF_VALUE: 27
PIF_VALUE: 13
PIF_VALUE: 23
PIF_VALUE: 23
PIF_VALUE: 28
PIF_VALUE: 24
PIF_VALUE: 27
PIF_VALUE: 10
PIF_VALUE: 20
PIF_VALUE: 24
PIF_VALUE: 28
PIF_VALUE: 23
PIF_VALUE: 24
PIF_VALUE: 27
PIF_VALUE: 25
PIF_VALUE: 27
PIF_VALUE: 29
PIF_VALUE: 26
PIF_VALUE: 28
PIF_VALUE: 27
PIF_VALUE: 24
PIF_VALUE: 23
PIF_VALUE: 27
PIF_VALUE: 24
PIF_VALUE: 22
PIF_VALUE: 15
PIF_VALUE: 19
PIF_VALUE: 27
PIF_VALUE: 27
PIF_VALUE: 26
PIF_VALUE: 25
PIF_VALUE: 3
PIF_VALUE: 2
PIF_VALUE: 27
PIF_VALUE: 18
PIF_VALUE: 27
PIF_VALUE: 15
PIF_VALUE: 0
PIF_VALUE: 28
PIF_VALUE: 27
PIF_VALUE: 24
PIF_VALUE: 1
PIF_VALUE: 28
PIF_VALUE: 24
PIF_VALUE: 28
PIF_VALUE: 28
PIF_VALUE: 24
PIF_VALUE: 26
PIF_VALUE: 27
PIF_VALUE: 23
PIF_VALUE: 24
PIF_VALUE: 27
PIF_VALUE: 24
PIF_VALUE: 28
PIF_VALUE: 27
PIF_VALUE: 24
PIF_VALUE: 24
PIF_VALUE: 27
PIF_VALUE: 2
PIF_VALUE: 27
PIF_VALUE: 23
PIF_VALUE: 23
PIF_VALUE: 26
PIF_VALUE: 26
PIF_VALUE: 27
PIF_VALUE: 23
PIF_VALUE: 27
PIF_VALUE: 28
PIF_VALUE: 27
PIF_VALUE: 27
PIF_VALUE: 19
PIF_VALUE: 27
PIF_VALUE: 23
PIF_VALUE: 28
PIF_VALUE: 24
PIF_VALUE: 24
PIF_VALUE: 23
PIF_VALUE: 27
PIF_VALUE: 24
PIF_VALUE: 23
PIF_VALUE: 18
PIF_VALUE: 1
PIF_VALUE: 27
PIF_VALUE: 18
PIF_VALUE: 28
PIF_VALUE: 24
PIF_VALUE: 24
PIF_VALUE: 1
PIF_VALUE: 24
PIF_VALUE: 23
PIF_VALUE: 23
PIF_VALUE: 24
PIF_VALUE: 27
PIF_VALUE: 27
PIF_VALUE: 28
PIF_VALUE: 1
PIF_VALUE: 2
PIF_VALUE: 27

## 2019-01-31 ASSESSMENT — PAIN - FUNCTIONAL ASSESSMENT: PAIN_FUNCTIONAL_ASSESSMENT: 0-10

## 2019-01-31 ASSESSMENT — PAIN SCALES - GENERAL
PAINLEVEL_OUTOF10: 0
PAINLEVEL_OUTOF10: 6
PAINLEVEL_OUTOF10: 0

## 2019-02-01 ENCOUNTER — OFFICE VISIT (OUTPATIENT)
Dept: ORTHOPEDIC SURGERY | Age: 69
End: 2019-02-01

## 2019-02-01 VITALS
DIASTOLIC BLOOD PRESSURE: 73 MMHG | TEMPERATURE: 97.5 F | BODY MASS INDEX: 30.23 KG/M2 | HEIGHT: 60 IN | HEART RATE: 64 BPM | WEIGHT: 154 LBS | SYSTOLIC BLOOD PRESSURE: 118 MMHG

## 2019-02-01 DIAGNOSIS — M75.101 TEAR OF RIGHT ROTATOR CUFF, UNSPECIFIED TEAR EXTENT: ICD-10-CM

## 2019-02-01 DIAGNOSIS — Z98.890 S/P ARTHROSCOPY OF RIGHT SHOULDER: Primary | ICD-10-CM

## 2019-02-01 DIAGNOSIS — S43.004D DISLOCATION OF RIGHT SHOULDER JOINT, SUBSEQUENT ENCOUNTER: ICD-10-CM

## 2019-02-01 PROCEDURE — 99024 POSTOP FOLLOW-UP VISIT: CPT | Performed by: ORTHOPAEDIC SURGERY

## 2019-02-08 ENCOUNTER — OFFICE VISIT (OUTPATIENT)
Dept: ORTHOPEDIC SURGERY | Age: 69
End: 2019-02-08

## 2019-02-08 VITALS
WEIGHT: 165 LBS | HEART RATE: 73 BPM | HEIGHT: 60 IN | DIASTOLIC BLOOD PRESSURE: 71 MMHG | SYSTOLIC BLOOD PRESSURE: 112 MMHG | TEMPERATURE: 98 F | BODY MASS INDEX: 32.39 KG/M2

## 2019-02-08 DIAGNOSIS — S43.004D DISLOCATION OF RIGHT SHOULDER JOINT, SUBSEQUENT ENCOUNTER: ICD-10-CM

## 2019-02-08 DIAGNOSIS — M75.101 TEAR OF RIGHT ROTATOR CUFF, UNSPECIFIED TEAR EXTENT: ICD-10-CM

## 2019-02-08 DIAGNOSIS — Z98.890 S/P ARTHROSCOPY OF RIGHT SHOULDER: Primary | ICD-10-CM

## 2019-02-08 PROCEDURE — 99024 POSTOP FOLLOW-UP VISIT: CPT | Performed by: ORTHOPAEDIC SURGERY

## 2019-03-13 ENCOUNTER — OFFICE VISIT (OUTPATIENT)
Dept: ORTHOPEDIC SURGERY | Age: 69
End: 2019-03-13

## 2019-03-13 VITALS — TEMPERATURE: 97.8 F | HEIGHT: 63 IN | WEIGHT: 151 LBS | BODY MASS INDEX: 26.75 KG/M2

## 2019-03-13 DIAGNOSIS — Z98.890 S/P ARTHROSCOPY OF RIGHT SHOULDER: Primary | ICD-10-CM

## 2019-03-13 DIAGNOSIS — S43.004D DISLOCATION OF RIGHT SHOULDER JOINT, SUBSEQUENT ENCOUNTER: ICD-10-CM

## 2019-03-13 DIAGNOSIS — M75.101 TEAR OF RIGHT ROTATOR CUFF, UNSPECIFIED TEAR EXTENT: ICD-10-CM

## 2019-03-13 PROCEDURE — 99024 POSTOP FOLLOW-UP VISIT: CPT | Performed by: ORTHOPAEDIC SURGERY

## 2019-03-18 ENCOUNTER — EVALUATION (OUTPATIENT)
Dept: PHYSICAL THERAPY | Age: 69
End: 2019-03-18
Payer: MEDICARE

## 2019-03-18 DIAGNOSIS — M62.511 MUSCLE WASTING AND ATROPHY, NOT ELSEWHERE CLASSIFIED, RIGHT SHOULDER: ICD-10-CM

## 2019-03-18 DIAGNOSIS — M25.511 RIGHT SHOULDER PAIN, UNSPECIFIED CHRONICITY: Primary | ICD-10-CM

## 2019-03-18 DIAGNOSIS — M25.611 DECREASED RANGE OF MOTION OF RIGHT SHOULDER: ICD-10-CM

## 2019-03-18 PROCEDURE — G8985 CARRY GOAL STATUS: HCPCS | Performed by: PHYSICAL THERAPIST

## 2019-03-18 PROCEDURE — 97110 THERAPEUTIC EXERCISES: CPT | Performed by: PHYSICAL THERAPIST

## 2019-03-18 PROCEDURE — 97140 MANUAL THERAPY 1/> REGIONS: CPT | Performed by: PHYSICAL THERAPIST

## 2019-03-18 PROCEDURE — G8984 CARRY CURRENT STATUS: HCPCS | Performed by: PHYSICAL THERAPIST

## 2019-03-18 PROCEDURE — 97162 PT EVAL MOD COMPLEX 30 MIN: CPT | Performed by: PHYSICAL THERAPIST

## 2019-03-21 ENCOUNTER — TREATMENT (OUTPATIENT)
Dept: PHYSICAL THERAPY | Age: 69
End: 2019-03-21
Payer: MEDICARE

## 2019-03-21 DIAGNOSIS — M25.611 DECREASED RANGE OF MOTION OF RIGHT SHOULDER: ICD-10-CM

## 2019-03-21 DIAGNOSIS — M25.511 RIGHT SHOULDER PAIN, UNSPECIFIED CHRONICITY: Primary | ICD-10-CM

## 2019-03-21 DIAGNOSIS — M62.511 MUSCLE WASTING AND ATROPHY, NOT ELSEWHERE CLASSIFIED, RIGHT SHOULDER: ICD-10-CM

## 2019-03-21 PROCEDURE — 97112 NEUROMUSCULAR REEDUCATION: CPT | Performed by: PHYSICAL THERAPIST

## 2019-03-21 PROCEDURE — 97110 THERAPEUTIC EXERCISES: CPT | Performed by: PHYSICAL THERAPIST

## 2019-03-21 PROCEDURE — 97140 MANUAL THERAPY 1/> REGIONS: CPT | Performed by: PHYSICAL THERAPIST

## 2019-03-22 ENCOUNTER — TREATMENT (OUTPATIENT)
Dept: PHYSICAL THERAPY | Age: 69
End: 2019-03-22
Payer: MEDICARE

## 2019-03-22 DIAGNOSIS — M62.511 MUSCLE WASTING AND ATROPHY, NOT ELSEWHERE CLASSIFIED, RIGHT SHOULDER: ICD-10-CM

## 2019-03-22 DIAGNOSIS — S62.304D OPEN DISPLACED FRACTURE OF FOURTH METACARPAL BONE OF RIGHT HAND WITH ROUTINE HEALING, UNSPECIFIED PORTION OF METACARPAL, SUBSEQUENT ENCOUNTER: ICD-10-CM

## 2019-03-22 DIAGNOSIS — M25.611 DECREASED RANGE OF MOTION OF RIGHT SHOULDER: ICD-10-CM

## 2019-03-22 DIAGNOSIS — S43.004D DISLOCATION OF RIGHT SHOULDER JOINT, SUBSEQUENT ENCOUNTER: ICD-10-CM

## 2019-03-22 DIAGNOSIS — M25.511 RIGHT SHOULDER PAIN, UNSPECIFIED CHRONICITY: Primary | ICD-10-CM

## 2019-03-22 DIAGNOSIS — M62.541 MUSCLE WASTING AND ATROPHY, NOT ELSEWHERE CLASSIFIED, RIGHT HAND: ICD-10-CM

## 2019-03-22 DIAGNOSIS — M25.641 JOINT STIFFNESS OF HAND, RIGHT: ICD-10-CM

## 2019-03-22 PROCEDURE — 97150 GROUP THERAPEUTIC PROCEDURES: CPT | Performed by: PHYSICAL THERAPIST

## 2019-03-22 PROCEDURE — 97140 MANUAL THERAPY 1/> REGIONS: CPT | Performed by: PHYSICAL THERAPIST

## 2019-03-26 ENCOUNTER — TREATMENT (OUTPATIENT)
Dept: PHYSICAL THERAPY | Age: 69
End: 2019-03-26
Payer: MEDICARE

## 2019-03-26 DIAGNOSIS — S62.304D OPEN DISPLACED FRACTURE OF FOURTH METACARPAL BONE OF RIGHT HAND WITH ROUTINE HEALING, UNSPECIFIED PORTION OF METACARPAL, SUBSEQUENT ENCOUNTER: Primary | ICD-10-CM

## 2019-03-26 DIAGNOSIS — M62.541 MUSCLE WASTING AND ATROPHY, NOT ELSEWHERE CLASSIFIED, RIGHT HAND: ICD-10-CM

## 2019-03-26 DIAGNOSIS — M25.511 RIGHT SHOULDER PAIN, UNSPECIFIED CHRONICITY: ICD-10-CM

## 2019-03-26 DIAGNOSIS — M25.641 JOINT STIFFNESS OF HAND, RIGHT: ICD-10-CM

## 2019-03-26 PROCEDURE — 97112 NEUROMUSCULAR REEDUCATION: CPT | Performed by: PHYSICAL THERAPIST

## 2019-03-26 PROCEDURE — 97140 MANUAL THERAPY 1/> REGIONS: CPT | Performed by: PHYSICAL THERAPIST

## 2019-03-26 PROCEDURE — 97110 THERAPEUTIC EXERCISES: CPT | Performed by: PHYSICAL THERAPIST

## 2019-03-27 ENCOUNTER — TREATMENT (OUTPATIENT)
Dept: PHYSICAL THERAPY | Age: 69
End: 2019-03-27
Payer: MEDICARE

## 2019-03-27 DIAGNOSIS — M62.541 MUSCLE WASTING AND ATROPHY, NOT ELSEWHERE CLASSIFIED, RIGHT HAND: ICD-10-CM

## 2019-03-27 DIAGNOSIS — M25.641 JOINT STIFFNESS OF HAND, RIGHT: ICD-10-CM

## 2019-03-27 DIAGNOSIS — M25.611 DECREASED RANGE OF MOTION OF RIGHT SHOULDER: ICD-10-CM

## 2019-03-27 DIAGNOSIS — S62.304D OPEN DISPLACED FRACTURE OF FOURTH METACARPAL BONE OF RIGHT HAND WITH ROUTINE HEALING, UNSPECIFIED PORTION OF METACARPAL, SUBSEQUENT ENCOUNTER: Primary | ICD-10-CM

## 2019-03-27 DIAGNOSIS — M25.511 RIGHT SHOULDER PAIN, UNSPECIFIED CHRONICITY: ICD-10-CM

## 2019-03-27 DIAGNOSIS — M62.511 MUSCLE WASTING AND ATROPHY, NOT ELSEWHERE CLASSIFIED, RIGHT SHOULDER: ICD-10-CM

## 2019-03-27 PROCEDURE — 97112 NEUROMUSCULAR REEDUCATION: CPT | Performed by: PHYSICAL THERAPIST

## 2019-03-27 PROCEDURE — 97140 MANUAL THERAPY 1/> REGIONS: CPT | Performed by: PHYSICAL THERAPIST

## 2019-04-02 ENCOUNTER — TREATMENT (OUTPATIENT)
Dept: PHYSICAL THERAPY | Age: 69
End: 2019-04-02
Payer: MEDICARE

## 2019-04-02 DIAGNOSIS — S62.304D OPEN DISPLACED FRACTURE OF FOURTH METACARPAL BONE OF RIGHT HAND WITH ROUTINE HEALING, UNSPECIFIED PORTION OF METACARPAL, SUBSEQUENT ENCOUNTER: Primary | ICD-10-CM

## 2019-04-02 DIAGNOSIS — M62.541 MUSCLE WASTING AND ATROPHY, NOT ELSEWHERE CLASSIFIED, RIGHT HAND: ICD-10-CM

## 2019-04-02 DIAGNOSIS — S43.004D DISLOCATION OF RIGHT SHOULDER JOINT, SUBSEQUENT ENCOUNTER: ICD-10-CM

## 2019-04-02 DIAGNOSIS — M25.641 JOINT STIFFNESS OF HAND, RIGHT: ICD-10-CM

## 2019-04-02 DIAGNOSIS — M25.511 RIGHT SHOULDER PAIN, UNSPECIFIED CHRONICITY: ICD-10-CM

## 2019-04-02 DIAGNOSIS — M25.611 DECREASED RANGE OF MOTION OF RIGHT SHOULDER: ICD-10-CM

## 2019-04-02 DIAGNOSIS — M62.511 MUSCLE WASTING AND ATROPHY, NOT ELSEWHERE CLASSIFIED, RIGHT SHOULDER: ICD-10-CM

## 2019-04-02 PROCEDURE — 97140 MANUAL THERAPY 1/> REGIONS: CPT

## 2019-04-02 PROCEDURE — 97110 THERAPEUTIC EXERCISES: CPT
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
## 2019-04-04 ENCOUNTER — TREATMENT (OUTPATIENT)
Dept: PHYSICAL THERAPY | Age: 69
End: 2019-04-04
Payer: MEDICARE

## 2019-04-04 DIAGNOSIS — M25.511 RIGHT SHOULDER PAIN, UNSPECIFIED CHRONICITY: ICD-10-CM

## 2019-04-04 DIAGNOSIS — S62.304D OPEN DISPLACED FRACTURE OF FOURTH METACARPAL BONE OF RIGHT HAND WITH ROUTINE HEALING, UNSPECIFIED PORTION OF METACARPAL, SUBSEQUENT ENCOUNTER: Primary | ICD-10-CM

## 2019-04-04 DIAGNOSIS — S43.004D DISLOCATION OF RIGHT SHOULDER JOINT, SUBSEQUENT ENCOUNTER: ICD-10-CM

## 2019-04-04 DIAGNOSIS — M62.541 MUSCLE WASTING AND ATROPHY, NOT ELSEWHERE CLASSIFIED, RIGHT HAND: ICD-10-CM

## 2019-04-04 DIAGNOSIS — M62.511 MUSCLE WASTING AND ATROPHY, NOT ELSEWHERE CLASSIFIED, RIGHT SHOULDER: ICD-10-CM

## 2019-04-04 DIAGNOSIS — M25.641 JOINT STIFFNESS OF HAND, RIGHT: ICD-10-CM

## 2019-04-04 DIAGNOSIS — M25.611 DECREASED RANGE OF MOTION OF RIGHT SHOULDER: ICD-10-CM

## 2019-04-04 PROCEDURE — 97140 MANUAL THERAPY 1/> REGIONS: CPT

## 2019-04-09 ENCOUNTER — TREATMENT (OUTPATIENT)
Dept: PHYSICAL THERAPY | Age: 69
End: 2019-04-09
Payer: MEDICARE

## 2019-04-09 DIAGNOSIS — M25.511 RIGHT SHOULDER PAIN, UNSPECIFIED CHRONICITY: ICD-10-CM

## 2019-04-09 DIAGNOSIS — M25.641 JOINT STIFFNESS OF HAND, RIGHT: ICD-10-CM

## 2019-04-09 DIAGNOSIS — M62.511 MUSCLE WASTING AND ATROPHY, NOT ELSEWHERE CLASSIFIED, RIGHT SHOULDER: ICD-10-CM

## 2019-04-09 DIAGNOSIS — S62.304D OPEN DISPLACED FRACTURE OF FOURTH METACARPAL BONE OF RIGHT HAND WITH ROUTINE HEALING, UNSPECIFIED PORTION OF METACARPAL, SUBSEQUENT ENCOUNTER: Primary | ICD-10-CM

## 2019-04-09 DIAGNOSIS — M25.611 DECREASED RANGE OF MOTION OF RIGHT SHOULDER: ICD-10-CM

## 2019-04-09 DIAGNOSIS — S43.004D DISLOCATION OF RIGHT SHOULDER JOINT, SUBSEQUENT ENCOUNTER: ICD-10-CM

## 2019-04-09 DIAGNOSIS — M62.541 MUSCLE WASTING AND ATROPHY, NOT ELSEWHERE CLASSIFIED, RIGHT HAND: ICD-10-CM

## 2019-04-09 PROCEDURE — 97140 MANUAL THERAPY 1/> REGIONS: CPT

## 2019-04-09 PROCEDURE — 97112 NEUROMUSCULAR REEDUCATION: CPT
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
## 2019-04-11 ENCOUNTER — TREATMENT (OUTPATIENT)
Dept: PHYSICAL THERAPY | Age: 69
End: 2019-04-11
Payer: MEDICARE

## 2019-04-11 DIAGNOSIS — M62.511 MUSCLE WASTING AND ATROPHY, NOT ELSEWHERE CLASSIFIED, RIGHT SHOULDER: ICD-10-CM

## 2019-04-11 DIAGNOSIS — M25.611 DECREASED RANGE OF MOTION OF RIGHT SHOULDER: ICD-10-CM

## 2019-04-11 DIAGNOSIS — M62.541 MUSCLE WASTING AND ATROPHY, NOT ELSEWHERE CLASSIFIED, RIGHT HAND: ICD-10-CM

## 2019-04-11 DIAGNOSIS — M25.641 JOINT STIFFNESS OF HAND, RIGHT: ICD-10-CM

## 2019-04-11 DIAGNOSIS — S62.304D OPEN DISPLACED FRACTURE OF FOURTH METACARPAL BONE OF RIGHT HAND WITH ROUTINE HEALING, UNSPECIFIED PORTION OF METACARPAL, SUBSEQUENT ENCOUNTER: Primary | ICD-10-CM

## 2019-04-11 DIAGNOSIS — S43.004D DISLOCATION OF RIGHT SHOULDER JOINT, SUBSEQUENT ENCOUNTER: ICD-10-CM

## 2019-04-11 DIAGNOSIS — M25.511 RIGHT SHOULDER PAIN, UNSPECIFIED CHRONICITY: ICD-10-CM

## 2019-04-11 PROCEDURE — 97140 MANUAL THERAPY 1/> REGIONS: CPT

## 2019-04-11 PROCEDURE — 97150 GROUP THERAPEUTIC PROCEDURES: CPT

## 2019-04-11 PROCEDURE — 97110 THERAPEUTIC EXERCISES: CPT
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
## 2019-04-16 ENCOUNTER — TREATMENT (OUTPATIENT)
Dept: PHYSICAL THERAPY | Age: 69
End: 2019-04-16
Payer: MEDICARE

## 2019-04-16 DIAGNOSIS — M62.511 MUSCLE WASTING AND ATROPHY, NOT ELSEWHERE CLASSIFIED, RIGHT SHOULDER: ICD-10-CM

## 2019-04-16 DIAGNOSIS — M25.511 RIGHT SHOULDER PAIN, UNSPECIFIED CHRONICITY: ICD-10-CM

## 2019-04-16 DIAGNOSIS — M25.611 DECREASED RANGE OF MOTION OF RIGHT SHOULDER: ICD-10-CM

## 2019-04-16 DIAGNOSIS — S43.004D DISLOCATION OF RIGHT SHOULDER JOINT, SUBSEQUENT ENCOUNTER: Primary | ICD-10-CM

## 2019-04-16 PROCEDURE — 97112 NEUROMUSCULAR REEDUCATION: CPT | Performed by: PHYSICAL THERAPIST

## 2019-04-16 PROCEDURE — 97110 THERAPEUTIC EXERCISES: CPT | Performed by: PHYSICAL THERAPIST

## 2019-04-16 PROCEDURE — 97140 MANUAL THERAPY 1/> REGIONS: CPT | Performed by: PHYSICAL THERAPIST

## 2019-04-16 NOTE — PROGRESS NOTES
PHYSICAL THERAPY DAILY TREATMENT NOTE    Date:  2019   Patient: Mady Brito  : 1950  MRN: [id number]  Physician: Elisha Jama MD  [address] [place] [address]       Dx: Dislocation of right shoulder joint, subsequent encounter [S43.004D]    Treatment Dx:    Visit Diagnoses       Codes    Right shoulder pain, unspecified chronicity     M25.511    Muscle wasting and atrophy, not elsewhere classified, right shoulder     M62.511    Decreased range of motion of right shoulder     M25.611            Area treated:  R shoulder    SUBJECTIVE     Pain:  [] Yes  [] No Location:   Pain Rating: (0-10 scale) /10    Daily Comments: Sore/stiff. Dressing continues to be modified independence. Pull over shirts are getting easier. C/o continued finger stiffness. OBJECTIVE     See rehab sheet for today's treatment    Time In:  11:25a            Time Out: 12:20             Visit #: 11    Treatment Charges:   Mins Units   Modalities       Ther Exercise   15 1   Neuromuscular Re-Education   10 1   Thera Activities       Manual Therapy   25 2   ADL/Home Mgt       Gait Training       Group Therapy       Non-Billable Service Time       Other     Total Time/Units 50 4     [] Group therapy/ non-billed for portions of treatment that were considered simultaneous activities per certain provider guidelines. [] Pre/post/intraservice treatment measurements included in treatment:    []   Ther Ex                                                                                                                          []   Manual therapy                                                                                                           []   Other:    Palpation:  PROM:  SOC:  flexion: 117, Abduction: 75; IR 45; ER 50; following manual therapy:  flexion: 127; Abduction 180; IR and ER about the same    Strength:  Joint Integrity: guarded end-feels    ASSESSMENT       [x] Progressing toward goals.     [] No change  [] Patient required verbal/tactile cueing for prompting/correction for:  [] Patient demonstrates ability to progress intensity for the following therapeutic procedures per rehab sheet. [] Patient demonstrates independence in procedures indicated on rehab sheet and will      be discharged to HEP. [] Tolerated today's therapy session with:  [x] Comment:  Improved dressing skills. Good ROM response to manual therapy. I think she will start progressing at a faster pace. Finger joints are stiff in R hand and seems musculotendinous. Pt. Education:  [x] Yes  [] No                               []   Reviewed Prior HEP/Ed       [x]  Instructed in and practiced updated HEP. []  Other:    Method of Education: [x] Verbal  [x] Demo-(tendon gliding of fingers 20x, 2-3x/day  [] Written    Comprehension of Education:  [] Verbalizes understanding. [] Demonstrates understanding. [] Needs review. [] Demonstrates/verbalizes HEP/Ed previously given. PLAN     [] Re-evaluate next session. [x] Continue per plan of care. [] Progress to discharge/HEP over the next ____ visits.   [] Hold skilled PT d/t:    [] Discharge from skilled PT services                [] Advance:    as tolerated  [] Discontinue:     due to:     [] Monitor between session response to:      Electronically signed by:  Nicci Feliciano, [address] [place] [address]

## 2019-04-18 ENCOUNTER — TREATMENT (OUTPATIENT)
Dept: PHYSICAL THERAPY | Age: 69
End: 2019-04-18
Payer: MEDICARE

## 2019-04-18 DIAGNOSIS — S62.304D OPEN DISPLACED FRACTURE OF FOURTH METACARPAL BONE OF RIGHT HAND WITH ROUTINE HEALING, UNSPECIFIED PORTION OF METACARPAL, SUBSEQUENT ENCOUNTER: ICD-10-CM

## 2019-04-18 DIAGNOSIS — M25.511 RIGHT SHOULDER PAIN, UNSPECIFIED CHRONICITY: ICD-10-CM

## 2019-04-18 DIAGNOSIS — S43.004D DISLOCATION OF RIGHT SHOULDER JOINT, SUBSEQUENT ENCOUNTER: Primary | ICD-10-CM

## 2019-04-18 DIAGNOSIS — M25.611 DECREASED RANGE OF MOTION OF RIGHT SHOULDER: ICD-10-CM

## 2019-04-18 DIAGNOSIS — M62.511 MUSCLE WASTING AND ATROPHY, NOT ELSEWHERE CLASSIFIED, RIGHT SHOULDER: ICD-10-CM

## 2019-04-18 DIAGNOSIS — M25.641 JOINT STIFFNESS OF HAND, RIGHT: ICD-10-CM

## 2019-04-18 DIAGNOSIS — M62.541 MUSCLE WASTING AND ATROPHY, NOT ELSEWHERE CLASSIFIED, RIGHT HAND: ICD-10-CM

## 2019-04-18 PROCEDURE — 97140 MANUAL THERAPY 1/> REGIONS: CPT

## 2019-04-18 PROCEDURE — 97110 THERAPEUTIC EXERCISES: CPT
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
## 2019-04-23 ENCOUNTER — TREATMENT (OUTPATIENT)
Dept: PHYSICAL THERAPY | Age: 69
End: 2019-04-23
Payer: MEDICARE

## 2019-04-23 DIAGNOSIS — M62.511 MUSCLE WASTING AND ATROPHY, NOT ELSEWHERE CLASSIFIED, RIGHT SHOULDER: ICD-10-CM

## 2019-04-23 DIAGNOSIS — S43.004D DISLOCATION OF RIGHT SHOULDER JOINT, SUBSEQUENT ENCOUNTER: Primary | ICD-10-CM

## 2019-04-23 DIAGNOSIS — M25.511 RIGHT SHOULDER PAIN, UNSPECIFIED CHRONICITY: ICD-10-CM

## 2019-04-23 DIAGNOSIS — M25.611 DECREASED RANGE OF MOTION OF RIGHT SHOULDER: ICD-10-CM

## 2019-04-23 PROCEDURE — G8984 CARRY CURRENT STATUS: HCPCS | Performed by: PHYSICAL THERAPIST

## 2019-04-23 PROCEDURE — G8985 CARRY GOAL STATUS: HCPCS | Performed by: PHYSICAL THERAPIST

## 2019-04-23 PROCEDURE — 97140 MANUAL THERAPY 1/> REGIONS: CPT | Performed by: PHYSICAL THERAPIST

## 2019-04-23 PROCEDURE — 97110 THERAPEUTIC EXERCISES: CPT | Performed by: PHYSICAL THERAPIST

## 2019-04-23 PROCEDURE — 97112 NEUROMUSCULAR REEDUCATION: CPT | Performed by: PHYSICAL THERAPIST

## 2019-04-23 PROCEDURE — 97164 PT RE-EVAL EST PLAN CARE: CPT | Performed by: PHYSICAL THERAPIST

## 2019-04-23 NOTE — PROGRESS NOTES
RE-EVALUATION: [x]       DISCHARGE: []     Date:  2019   Patient: Trisha Gipson  : 1950  MRN: [id number]  Physician: Chyna Brewer MD  [address] [place] [address]        Medical Diagnosis: Dislocation of right shoulder joint, subsequent encounter [S43.004D]  Treatment Diagnosis:   Visit Diagnoses       Codes    Right shoulder pain, unspecified chronicity     M25.511    Muscle wasting and atrophy, not elsewhere classified, right shoulder     M62.511    Decreased range of motion of right shoulder     M25.611              SUBJECTIVE:       Past Medical History:   Diagnosis Date    AC (acromioclavicular) arthritis     right shoulder    Diverticulitis     Esophagitis     GERD (gastroesophageal reflux disease)     Hiatal hernia     Hypertension     Hypothyroid     IBS (irritable bowel syndrome)     Impingement syndrome of right shoulder     right shoulder    PONV (postoperative nausea and vomiting)     Pre-operative clearance     medical and neuro for OR 2019.  SDH (subdural hematoma) (HCC)     h/o  follows with Dr Evelin Kinsey     Past Surgical History:   Procedure Laterality Date    ABDOMEN SURGERY      ABDOMINOPLASTY      BRAIN AVM REPAIR Left     BREAST REDUCTION SURGERY      BUNIONECTOMY Bilateral     COLONOSCOPY      FRACTURE SURGERY      OVARY REMOVAL Bilateral     RHINOPLASTY      SHOULDER ARTHROSCOPY Right 2015    SA decomp w/Michelle resect of dist clavicle/Inj for post-op pain HARISH Cotter MD    SHOULDER ARTHROSCOPY Right 2019    RIGHT SHOULDER ARTHROSCOPY ROTATOR CUFF REPAIR, SUBSCAPULAR REPAIR, BICEPS TENODESIS + ISB [facility] since onset of treatment: Improving pain. more independent dressing.       Pain:  Current 0/10 Best  0/10  Worst 5/10    Type/Quality/Location/Behavior:  Intermitttent and more occasional episodes of pain. Current ADL/Functional Status: Unable to use RUE. Modified independence dressing. Unable to wash hair. Sleeping comfortably in recliner. Not comfortable in bed. Unable to drive    Occupation: Retired  ; Status:    Outcome Measure: PT G-Codes  Functional Assessment Tool Used: QDASH  Score: 61%  Functional Limitation: Carrying, moving and handling objects  Carrying, Moving and Handling Objects Current Status (): At least 60 percent but less than 80 percent impaired, limited or restricted  Carrying, Moving and Handling Objects Goal Status (): At least 20 percent but less than 40 percent impaired, limited or restricted    Precautions:  post-op    OBJECTIVE:       Todays Treatment:  See rehab sheet    Time In:  10: 25            Time Out: 11:33             Visit #: 12    Treatment Charges:   Mins Units   Re-Evaluation   10 1   Therapeutic Exercise   15 1   Manual Therapy   30 2   Ther Activities       Neuromuscular Re-Education   10 1   ADL/Home Training       Gait Training     Group Therapy     Modalities     Non-Billable Service Time       Other     Total Time/Units 65 5     [] Group therapy/Non-billed for portions of treatment that were considered simultaneous activities per certain provider guidelines.       Inspection:  Standing  Scapulo-humeral Rhythm: [] Normal  [x] Irregular      Observations:  MIldly depressed R scapula    Palpation: Tender to palpation at Latisimus, posterior deltoid, infraspinatus    Joint Integrity: muscular end-feels, glenohumeral stiffness           Joint/Motion Active  Right ROM  Left   Passive  Right ROM  Left   Strength  Right Strength  Left     Shoulder Flexion 70     95     2       Shoulder Extension  °  °                 °                °           Shoulder Abduction 40° °   80°  °   2       Shoulder ER ° °   35  deg °   NT       Shoulder IR L2° °   50° at 80 deg abd °   2       Scapular Elevation ° °   ° °   2+ 5     Scapular Retraction ° °   ° °   NT       Scapular Protraction ° °   ° °              Elbow Flexion WNL           3       Elbow Extension -7                   Forearm Supination 70                   Forearm Pronation                     Wrist Extension                     Wrist Flexion                     Wrist Radial Deviation ° °   ° °           Wrist Ulnar Deviation ° °   ° °                Neurological: neg      ASSESSMENT     Comments:  Improving A/PROM of shoulder and elbow. Pain has improved. She seems to be ready to progress to AAROM/AROM activities. PT will wait on clearance from ortho. Rationale for Skilled PT/Adjusted Goals: Patient requires addition time in skilled PT for (   manual therapy, progression of active therapy as allowed by ortho), so patient is able to restore (full AROM and strength for independence in ADLs, self care.)    Physical Therapy Recommendations:  [x] Continue skilled PT for ()                                                                    [] Discharge skilled PT                         [] Hold PT d/t  ()    Skilled PT services required for this patient because of impairments in (  AROM, PROM, strength of RUE     ) resulting in loss of functional  tolerances/independence in (  material handling, self care, driving, sleeping         ). [x] There are no barriers  affecting POC or recovery    [] Barriers to this patient's POC/recovery include (           ). Expected treatment duration is:  (6 weeks)    LTG: (to be met in 12 visits)-Adjusted for time needed to progress through post-op process. 1. ? Pain:  0-2/10-PROGRESSING  2. ? ROM: 140 flexion, 160 abduction, T12 Apley IR-PROGRESSING  3. ? Strength:  4/5 R shoulder strength-PROGRESSING  4. ? Function: Independently dress, groom, material handle for ADLs, sleep comfortably and drive. -PROGRESSING  5.  Independent with Home Exercise Programs          PLAN     Treatment Plan:     [x] Therapeutic Exercise  [] Modalities:  [x] Therapeutic Activity  [] Ultrasound  [] Electrical Stimulation  [] Gait Training   [] Massage/Fascial release    [] Lumbar/Cervical           Traction  [x] Neuromuscular Re-education [] Cold/hotpack [] Iontophoresis: 4 mg/mL  [x] Instruction in HEP             Dexamethasone Sodium  [] Manual Therapy             Phosphate 40-80 mAmin  [] Pain Neuroscience                 [] Vasocompression/ [] Kinesiotaping; cupping            Game Ready   [] Integrative dry needling  [] Aerobic conditioning [] Work/Sport specific activities  []  Medication allergies reviewed for use of    Dexamethasone Sodium Phosphate 4mg/ml     with iontophoresis treatments. Pt is not allergic. Rehab Potential:   [x] Good  [] Fair  [] Poor   Suggested Professional Referral: [x] No  [] Yes:  Barriers to Goal Achievement[de-identified]   [x] No  [] Yes:  Domestic Concerns:     [x] No  [] Yes:    Patient Education:                                       [x] Discussed progress, related physical/functional limitations, treatment plan, outcome potential and updated goals. [x]  Instruct/practiced Home exercise program                                    [] Other:     Method of Education: [x] Verbal  [x] Demo  [] Written    Comprehension of Education:  [x] Verbalizes understanding. [] Demonstrates understanding. [x] Needs Review. [] Demonstrates/verbalizes understanding of HEP/Ed previously given. Frequency:2- 3 x/week for 12 visits     Patient understands diagnosis/prognosis and consents to treatment, plan and goals: [x] Yes   [] No       Electronically signed by: Val Engel [address] Medicare Patients Only     Please sign Physician's Certification and return to: [facility] [address]  [address] [place], [place] [id number]  Dept: [phone]  Dept Fax: [phone] Certification / Comments     Frequency/Duration  / week for  visits. Certification period From:    To:     I have reviewed the Plan of Care established for skilled therapy services and certify that the services are required and that they will be provided while the patient is under my care.     Physician's Comments/Revisions:                 Physician's Printed Name:                                                                      [de-identified] Signature:                                                          Date:

## 2019-04-24 ENCOUNTER — OFFICE VISIT (OUTPATIENT)
Dept: ORTHOPEDIC SURGERY | Age: 69
End: 2019-04-24

## 2019-04-24 VITALS
SYSTOLIC BLOOD PRESSURE: 133 MMHG | DIASTOLIC BLOOD PRESSURE: 75 MMHG | BODY MASS INDEX: 25.61 KG/M2 | HEIGHT: 64 IN | HEART RATE: 49 BPM | WEIGHT: 150 LBS

## 2019-04-24 DIAGNOSIS — M75.101 TEAR OF RIGHT ROTATOR CUFF, UNSPECIFIED TEAR EXTENT: ICD-10-CM

## 2019-04-24 DIAGNOSIS — Z98.890 S/P ARTHROSCOPY OF RIGHT SHOULDER: Primary | ICD-10-CM

## 2019-04-24 DIAGNOSIS — S43.004D DISLOCATION OF RIGHT SHOULDER JOINT, SUBSEQUENT ENCOUNTER: ICD-10-CM

## 2019-04-24 PROCEDURE — 99024 POSTOP FOLLOW-UP VISIT: CPT | Performed by: ORTHOPAEDIC SURGERY

## 2019-04-24 NOTE — PROGRESS NOTES
Post Operative Visit     Surgery:   1. Right shoulder arthroscopy, rotator cuff repair involving subscapularis, supraspinatus, infraspinatus.    2. Biceps tenodesis. 3.  Subacromial debridement and bursectomy   4.  Extensive lysis of adhesions   Date: 1/31/19      Subjective:    Lashawn Willard is here for follow up visit s/p above procedure. She is doing well. She has been working with PT. She is having improvement in her pain. She is still fairly stiff    Controlled Substances Monitoring:        Physical Exam:    Height: 5' 4\" (1.626 m), Weight: 150 lb (68 kg), BP: 133/75    On exam in the upright position, I can forward elevate her about 90°. She can also do this actively. In the supine position passively I can elevate her about 120°. She does have stiffness but soft endpoint. She is able to belly press with only mild decreased motion. She is able to her arm and empty can position. Internal rotation is to about L1      Imaging: No new imaging today    Assessment and Plan:  She is a little 3 months out from repair of right shoulder massive rotator cuff tear  Overall she is doing well. We discussed the importance of continuing to progress with range of motion. She will continue PT. She can discontinue her sling. She should avoid any lifting.   Follow up in 6 weeks    Kelsie Ramires MD  Orthopaedic Surgery   4/24/19  10:27 AM

## 2019-04-29 ENCOUNTER — TREATMENT (OUTPATIENT)
Dept: PHYSICAL THERAPY | Age: 69
End: 2019-04-29
Payer: MEDICARE

## 2019-04-29 DIAGNOSIS — M25.611 DECREASED RANGE OF MOTION OF RIGHT SHOULDER: ICD-10-CM

## 2019-04-29 DIAGNOSIS — S62.304D OPEN DISPLACED FRACTURE OF FOURTH METACARPAL BONE OF RIGHT HAND WITH ROUTINE HEALING, UNSPECIFIED PORTION OF METACARPAL, SUBSEQUENT ENCOUNTER: ICD-10-CM

## 2019-04-29 DIAGNOSIS — M25.641 JOINT STIFFNESS OF HAND, RIGHT: ICD-10-CM

## 2019-04-29 DIAGNOSIS — S43.004D DISLOCATION OF RIGHT SHOULDER JOINT, SUBSEQUENT ENCOUNTER: Primary | ICD-10-CM

## 2019-04-29 DIAGNOSIS — M62.541 MUSCLE WASTING AND ATROPHY, NOT ELSEWHERE CLASSIFIED, RIGHT HAND: ICD-10-CM

## 2019-04-29 DIAGNOSIS — M25.511 RIGHT SHOULDER PAIN, UNSPECIFIED CHRONICITY: ICD-10-CM

## 2019-04-29 DIAGNOSIS — M62.511 MUSCLE WASTING AND ATROPHY, NOT ELSEWHERE CLASSIFIED, RIGHT SHOULDER: ICD-10-CM

## 2019-04-29 PROCEDURE — 97112 NEUROMUSCULAR REEDUCATION: CPT

## 2019-04-29 PROCEDURE — 97140 MANUAL THERAPY 1/> REGIONS: CPT

## 2019-04-29 PROCEDURE — 97110 THERAPEUTIC EXERCISES: CPT
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
## 2019-05-01 ENCOUNTER — TREATMENT (OUTPATIENT)
Dept: PHYSICAL THERAPY | Age: 69
End: 2019-05-01
Payer: MEDICARE

## 2019-05-01 DIAGNOSIS — M62.511 MUSCLE WASTING AND ATROPHY, NOT ELSEWHERE CLASSIFIED, RIGHT SHOULDER: ICD-10-CM

## 2019-05-01 DIAGNOSIS — M25.611 DECREASED RANGE OF MOTION OF RIGHT SHOULDER: ICD-10-CM

## 2019-05-01 DIAGNOSIS — S43.004D DISLOCATION OF RIGHT SHOULDER JOINT, SUBSEQUENT ENCOUNTER: Primary | ICD-10-CM

## 2019-05-01 DIAGNOSIS — M62.541 MUSCLE WASTING AND ATROPHY, NOT ELSEWHERE CLASSIFIED, RIGHT HAND: ICD-10-CM

## 2019-05-01 DIAGNOSIS — S62.304D OPEN DISPLACED FRACTURE OF FOURTH METACARPAL BONE OF RIGHT HAND WITH ROUTINE HEALING, UNSPECIFIED PORTION OF METACARPAL, SUBSEQUENT ENCOUNTER: ICD-10-CM

## 2019-05-01 DIAGNOSIS — M25.641 JOINT STIFFNESS OF HAND, RIGHT: ICD-10-CM

## 2019-05-01 DIAGNOSIS — M25.511 RIGHT SHOULDER PAIN, UNSPECIFIED CHRONICITY: ICD-10-CM

## 2019-05-01 PROCEDURE — 97140 MANUAL THERAPY 1/> REGIONS: CPT | Performed by: PHYSICAL THERAPIST

## 2019-05-01 PROCEDURE — 97112 NEUROMUSCULAR REEDUCATION: CPT | Performed by: PHYSICAL THERAPIST

## 2019-05-01 PROCEDURE — 97110 THERAPEUTIC EXERCISES: CPT | Performed by: PHYSICAL THERAPIST

## 2019-05-01 NOTE — PROGRESS NOTES
PHYSICAL THERAPY DAILY TREATMENT NOTE    Date:  2019   Patient: Lashawn Willard  : 1950  MRN: [id number]  Physician: Dr. Nicole Armstrong       Dx: Dislocation of right shoulder joint, subsequent encounter [S43.004D]    Treatment Dx:    Visit Diagnoses       Codes    Right shoulder pain, unspecified chronicity     M25.511    Muscle wasting and atrophy, not elsewhere classified, right shoulder     M62.511    Decreased range of motion of right shoulder     M25.611    Open displaced fracture of fourth metacarpal bone of right hand with routine healing, unspecified portion of metacarpal, subsequent encounter     S62.304D    Muscle wasting and atrophy, not elsewhere classified, right hand     M62.541    Joint stiffness of hand, right     M25.641            Area treated: R shoulder     SUBJECTIVE     Pain:  [] Yes  [] No Location:   Pain Rating: (0-10 scale) /10    Daily Comments: Pt reports she felt worked after last visit but not overly painful. Outcomes:     OBJECTIVE     See rehab sheet for today's treatment    Time In: 11:30 a            Time Out: 12:30 p            Visit #:14    Treatment Charges:   Mins Units   Modalities       Ther Exercise   21 1   Neuromuscular Re-Education   14 1   Thera Activities       Manual Therapy   25 2   ADL/Home Mgt       Gait Training       Group Therapy       Non-Billable Service Time       Other     Total Time/Units 60 4     [] Group therapy/ non-billed for portions of treatment that were considered simultaneous activities per certain provider guidelines.       [] Pre/post/intraservice treatment measurements included in treatment:    []   Ther Ex                                                                                                                          []   Manual therapy                                                                                                           []   Other:    Palpation:  ROM:  Strength:  Joint Integrity:    ASSESSMENT       [] Progressing toward goals. [] No change  [] Patient required verbal/tactile cueing for prompting/correction for:  [] Patient demonstrates ability to progress intensity for the following therapeutic procedures per rehab sheet. [] Patient demonstrates independence in procedures indicated on rehab sheet and will      be discharged to HEP. [] Tolerated today's therapy session with:  [x] Comment:  Due to progression last visit continued with POC as noted on treatment card. Pt reports soreness but no severe pain following session. Invasive Procedure Time Out:     Correct Patient -2 indentifiers:  [] Yes    Medical history, Contraindications, Review of Medications:  [] Yes    Location Confirmed:  [] Yes    Patient Agreement on Location: [] Yes    Informed Consent: Written    Pt. Education:  [] Yes  [] No                               []   Reviewed Prior HEP/Ed       []  Instructed in and practiced updated HEP. []  Other:    Method of Education: [] Verbal  [] Demo  [] Written    Comprehension of Education:  [] Verbalizes understanding. [] Demonstrates understanding. [] Needs review. [] Demonstrates/verbalizes HEP/Ed previously given. PLAN         [] Re-evaluate next session. [x] Continue per plan of care. [] Progress to discharge/HEP over the next ____ visits.   [] Hold skilled PT d/t:    [] Discharge from skilled PT services                [x] Advance:    as tolerated  [] Discontinue:     due to:     [] Monitor between session response to:      Electronically signed by:  Dianna Brown [phone]

## 2019-05-02 ENCOUNTER — TREATMENT (OUTPATIENT)
Dept: PHYSICAL THERAPY | Age: 69
End: 2019-05-02
Payer: MEDICARE

## 2019-05-02 DIAGNOSIS — M25.511 RIGHT SHOULDER PAIN, UNSPECIFIED CHRONICITY: ICD-10-CM

## 2019-05-02 DIAGNOSIS — M62.541 MUSCLE WASTING AND ATROPHY, NOT ELSEWHERE CLASSIFIED, RIGHT HAND: ICD-10-CM

## 2019-05-02 DIAGNOSIS — M25.611 DECREASED RANGE OF MOTION OF RIGHT SHOULDER: ICD-10-CM

## 2019-05-02 DIAGNOSIS — S62.304D OPEN DISPLACED FRACTURE OF FOURTH METACARPAL BONE OF RIGHT HAND WITH ROUTINE HEALING, UNSPECIFIED PORTION OF METACARPAL, SUBSEQUENT ENCOUNTER: ICD-10-CM

## 2019-05-02 DIAGNOSIS — M62.511 MUSCLE WASTING AND ATROPHY, NOT ELSEWHERE CLASSIFIED, RIGHT SHOULDER: ICD-10-CM

## 2019-05-02 DIAGNOSIS — M25.641 JOINT STIFFNESS OF HAND, RIGHT: ICD-10-CM

## 2019-05-02 DIAGNOSIS — S43.004D DISLOCATION OF RIGHT SHOULDER JOINT, SUBSEQUENT ENCOUNTER: Primary | ICD-10-CM

## 2019-05-02 PROCEDURE — 97110 THERAPEUTIC EXERCISES: CPT

## 2019-05-02 PROCEDURE — 97530 THERAPEUTIC ACTIVITIES: CPT

## 2019-05-02 PROCEDURE — 97140 MANUAL THERAPY 1/> REGIONS: CPT
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
## 2019-05-06 ENCOUNTER — TREATMENT (OUTPATIENT)
Dept: PHYSICAL THERAPY | Age: 69
End: 2019-05-06
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
## 2019-05-09 ENCOUNTER — TREATMENT (OUTPATIENT)
Dept: PHYSICAL THERAPY | Age: 69
End: 2019-05-09
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
## 2019-05-14 ENCOUNTER — TREATMENT (OUTPATIENT)
Dept: PHYSICAL THERAPY | Age: 69
End: 2019-05-14
Payer: MEDICARE

## 2019-05-14 DIAGNOSIS — M25.511 RIGHT SHOULDER PAIN, UNSPECIFIED CHRONICITY: ICD-10-CM

## 2019-05-14 DIAGNOSIS — M62.511 MUSCLE WASTING AND ATROPHY, NOT ELSEWHERE CLASSIFIED, RIGHT SHOULDER: ICD-10-CM

## 2019-05-14 DIAGNOSIS — S43.004D DISLOCATION OF RIGHT SHOULDER JOINT, SUBSEQUENT ENCOUNTER: Primary | ICD-10-CM

## 2019-05-14 DIAGNOSIS — M25.611 DECREASED RANGE OF MOTION OF RIGHT SHOULDER: ICD-10-CM

## 2019-05-14 PROCEDURE — 97140 MANUAL THERAPY 1/> REGIONS: CPT | Performed by: PHYSICAL THERAPIST

## 2019-05-14 PROCEDURE — 97112 NEUROMUSCULAR REEDUCATION: CPT | Performed by: PHYSICAL THERAPIST

## 2019-05-14 PROCEDURE — 97164 PT RE-EVAL EST PLAN CARE: CPT | Performed by: PHYSICAL THERAPIST

## 2019-05-14 PROCEDURE — 97110 THERAPEUTIC EXERCISES: CPT | Performed by: PHYSICAL THERAPIST

## 2019-05-14 NOTE — PROGRESS NOTES
RE-EVALUATION: [x]         DISCHARGE: [x]     Date:  2019   Patient: Katherine Souza  : 1950  MRN: [id number]  Physician: Saurav Thibodeaux MD  [address] [facility]        Medical Diagnosis: Dislocation of right shoulder joint, subsequent encounter [S43.004D]  Treatment Diagnosis:   Visit Diagnoses       Codes    Right shoulder pain, unspecified chronicity     M25.511    Muscle wasting and atrophy, not elsewhere classified, right shoulder     M62.511    Decreased range of motion of right shoulder     M25.611              SUBJECTIVE:       Past Medical History:   Diagnosis Date    AC (acromioclavicular) arthritis     right shoulder    Diverticulitis     Esophagitis     GERD (gastroesophageal reflux disease)     Hiatal hernia     Hypertension     Hypothyroid     IBS (irritable bowel syndrome)     Impingement syndrome of right shoulder     right shoulder    PONV (postoperative nausea and vomiting)     Pre-operative clearance     medical and neuro for OR 2019.  SDH (subdural hematoma) (HCC)     h/o  follows with Dr Etta Cash     Past Surgical History:   Procedure Laterality Date    ABDOMEN SURGERY      ABDOMINOPLASTY      BRAIN AVM REPAIR Left     BREAST REDUCTION SURGERY      BUNIONECTOMY Bilateral     COLONOSCOPY      FRACTURE SURGERY      OVARY REMOVAL Bilateral     RHINOPLASTY      SHOULDER ARTHROSCOPY Right 2015    SA decomp w/Michelle resect of dist clavicle/Inj for post-op pain HARISH Cotter MD    SHOULDER ARTHROSCOPY Right 2019    RIGHT SHOULDER ARTHROSCOPY ROTATOR CUFF REPAIR, SUBSCAPULAR REPAIR, BICEPS TENODESIS + ISB performed by Saurav Thibodeaux MD at [facility] [facility] since onset of treatment: Pain improved. Pain:  Current 3/10 Best  0/10  Worst 7/10    Type/Quality/Location/Behavior:  Intermittent pain in the shoulder and upper arm.   If she is doing active assistive exercise arm overhead in supine position will feel a stiffness from the right side of the trunk, down though the RUE. Overhead reach can provoke tingling down her arm, non-specifically. Current ADL/Functional Status: Modified independence, increased time dressing. Unable to lift with RUE, other than food to mouth, cup of beverage. Can carry one dish, but cannot be full of any food. Can carry a light grocery bag at the side. Occupation: Retired  ; Status:    Outcome Measure:  QDASH: 40/55    Precautions:    OBJECTIVE:       Trisha Douse Treatment:  See rehab sheet    Time In: 9:35a            Time Out: 10:50a             Visit #: 18    Treatment Charges:   Mins Units   Re-Evaluation   10 1   Therapeutic Exercise   20 1   Manual Therapy   25 2   Ther Activities       Neuromuscular Re-Education   12 1   ADL/Home Training       Gait Training     Group Therapy     Modalities     Non-Billable Service Time       Other     Total Time/Units 67 5     [] Group therapy/Non-billed for portions of treatment that were considered simultaneous activities per certain provider guidelines. Inspection:  Standing  Scapulo-humeral Rhythm: [] Normal  [x] Irregular    Thoracic:                            [x] Increased Kyphosis [] Decreased Kyphosis    Observations: Scap shrug with abduction and flexion. Palpation: Tender to palpation at bicep and long head bicep tendon.   Levator, upper trap        Joint/Motion Active  Right ROM  Left   Passive  Right ROM  Left   Strength  Right Strength  Left     Shoulder Flexion 87     139     2+       Shoulder Extension  °  °                 °                °           Shoulder Abduction 75° °   95°  °   2+       Shoulder ER ° °   64  deg °   NT       Shoulder IR L2° °   50° at 80 deg abd °   3       Scapular Elevation ° °   ° °   2+ 5     Scapular Retraction ° °   ° °   NT       Scapular Protraction ° °   ° °              Elbow Flexion WNL           3+       Elbow Extension -7                    Neurological:         ASSESSMENT     Comments: Patient continues to make progress. She is limited by post-surgical restrictions, AROM, PROM, strength and function restrictions. D/t patient wishes, she will transfer to another center for PT. Physical Therapy Recommendations:  [] Continue skilled PT for ()                                                                    [x] Discharge skilled PT                         [] Hold PT d/t  ()      1. LTG: Good progress, not met. PLAN       Rehab Potential:   [x] Good  [] Fair  [] Poor   Suggested Professional Referral: [x] No  [] Yes:  Barriers to Goal Achievement[de-identified]   [x] No  [] Yes:  Domestic Concerns:     [x] No  [] Yes:    Patient Education:                                       [x] Discussed progress, related physical/functional limitations, treatment plan, outcome potential and updated goals. [x]  Instruct/practiced Home exercise program                                    [] Other:     Method of Education: [x] Verbal  [x] Demo  [] Written    Comprehension of Education:  [] Verbalizes understanding. [x] Demonstrates understanding. [] Needs Review. [] Demonstrates/verbalizes understanding of HEP/Ed previously given. Patient understands diagnosis/prognosis and consents to treatment, plan and goals: [x] Yes   [] No       Discharge Due To: Transfer to another Center    Electronically signed by: Yumiko Dooley [address] Medicare Patients Only     Please sign Physician's Certification and return to: [address]  [address] [place] [address]  Dept: [phone]  Dept Fax: [phone] Certification / Comments     Frequency/Duration  / week for  visits. Certification period From:    To:     I have reviewed the Plan of Care established for skilled therapy services and certify that the services are required and that they will be provided while the patient is under my care.     Physician's Comments/Revisions:                 Physician's Printed Name:                                                                      [de-identified] Signature:                                                          Date:

## 2019-06-05 ENCOUNTER — OFFICE VISIT (OUTPATIENT)
Dept: ORTHOPEDIC SURGERY | Age: 69
End: 2019-06-05
Payer: MEDICARE

## 2019-06-05 VITALS
SYSTOLIC BLOOD PRESSURE: 131 MMHG | HEIGHT: 64 IN | HEART RATE: 63 BPM | DIASTOLIC BLOOD PRESSURE: 73 MMHG | WEIGHT: 155 LBS | BODY MASS INDEX: 26.46 KG/M2

## 2019-06-05 DIAGNOSIS — S43.004D DISLOCATION OF RIGHT SHOULDER JOINT, SUBSEQUENT ENCOUNTER: ICD-10-CM

## 2019-06-05 DIAGNOSIS — Z98.890 S/P ARTHROSCOPY OF RIGHT SHOULDER: Primary | ICD-10-CM

## 2019-06-05 DIAGNOSIS — M75.101 TEAR OF RIGHT ROTATOR CUFF, UNSPECIFIED TEAR EXTENT: ICD-10-CM

## 2019-06-05 PROCEDURE — 99213 OFFICE O/P EST LOW 20 MIN: CPT | Performed by: ORTHOPAEDIC SURGERY

## 2019-06-05 RX ORDER — ROSUVASTATIN CALCIUM 10 MG/1
10 TABLET, COATED ORAL DAILY
Refills: 2 | COMMUNITY
Start: 2019-05-10 | End: 2021-01-28

## 2019-06-05 NOTE — PROGRESS NOTES
Post Operative Visit     Surgery:   1. Right shoulder arthroscopy, rotator cuff repair involving subscapularis, supraspinatus, infraspinatus.    2. Biceps tenodesis. 3.  Subacromial debridement and bursectomy   4.  Extensive lysis of adhesions   Date: 1/31/19      Subjective:    Millie Cruz is here for follow up visit s/p above procedure. She is doing well. She has been progressing slowly with physical therapy    Controlled Substances Monitoring:        Physical Exam:    Height: 5' 4\" (1.626 m), Weight: 155 lb (70.3 kg), BP: 131/73    On exam of the shoulder, incisions are healed. Her deltoid muscle is functioning. She is able to forward elevate about 100°. Passively I can't increases to 130° with mild stiffness. Internal rotation is to the right hip. Belly press test is difficult to assess due to stiffness. Biceps contour is normal      Imaging: No new imaging today. Previous imaging reviewed    Assessment and Plan:  Status post right shoulder massive rotator cuff repair, biceps tenodesis  We discussed her shoulder. Given the extent of her tear at think she is doing well all things considered. She continues to make progress with therapy although slowly. This is anticipated. She was reassured today. She will continue with physical therapy.   Follow up in 3 months    Claudell Narrow, MD  Orthopaedic Surgery   6/5/19  3:41 PM

## 2019-08-07 LAB
ALBUMIN SERPL-MCNC: NORMAL G/DL
ALP BLD-CCNC: NORMAL U/L
ALT SERPL-CCNC: NORMAL U/L
ANION GAP SERPL CALCULATED.3IONS-SCNC: NORMAL MMOL/L
AST SERPL-CCNC: NORMAL U/L
BILIRUB SERPL-MCNC: NORMAL MG/DL
BUN BLDV-MCNC: NORMAL MG/DL
CALCIUM SERPL-MCNC: NORMAL MG/DL
CHLORIDE BLD-SCNC: NORMAL MMOL/L
CHOLESTEROL, TOTAL: NORMAL
CHOLESTEROL/HDL RATIO: NORMAL
CO2: NORMAL
CREAT SERPL-MCNC: NORMAL MG/DL
GFR CALCULATED: NORMAL
GLUCOSE BLD-MCNC: NORMAL MG/DL
HDLC SERPL-MCNC: NORMAL MG/DL
LDL CHOLESTEROL CALCULATED: NORMAL
POTASSIUM SERPL-SCNC: NORMAL MMOL/L
SODIUM BLD-SCNC: NORMAL MMOL/L
TOTAL PROTEIN: NORMAL
TRIGL SERPL-MCNC: NORMAL MG/DL
VLDLC SERPL CALC-MCNC: NORMAL MG/DL

## 2019-09-04 ENCOUNTER — OFFICE VISIT (OUTPATIENT)
Dept: ORTHOPEDIC SURGERY | Age: 69
End: 2019-09-04
Payer: MEDICARE

## 2019-09-04 VITALS
WEIGHT: 160 LBS | DIASTOLIC BLOOD PRESSURE: 73 MMHG | HEIGHT: 64 IN | SYSTOLIC BLOOD PRESSURE: 140 MMHG | BODY MASS INDEX: 27.31 KG/M2 | HEART RATE: 52 BPM

## 2019-09-04 DIAGNOSIS — S43.004D DISLOCATION OF RIGHT SHOULDER JOINT, SUBSEQUENT ENCOUNTER: ICD-10-CM

## 2019-09-04 DIAGNOSIS — Z98.890 S/P ARTHROSCOPY OF RIGHT SHOULDER: Primary | ICD-10-CM

## 2019-09-04 PROCEDURE — 99213 OFFICE O/P EST LOW 20 MIN: CPT | Performed by: ORTHOPAEDIC SURGERY

## 2019-09-11 DIAGNOSIS — Z98.890 S/P ARTHROSCOPY OF RIGHT SHOULDER: Primary | ICD-10-CM

## 2019-11-01 LAB
FOLATE: NORMAL
VITAMIN B-12: NORMAL

## 2020-01-08 ENCOUNTER — OFFICE VISIT (OUTPATIENT)
Dept: ORTHOPEDIC SURGERY | Age: 70
End: 2020-01-08
Payer: MEDICARE

## 2020-01-08 VITALS
BODY MASS INDEX: 28.51 KG/M2 | SYSTOLIC BLOOD PRESSURE: 115 MMHG | HEART RATE: 62 BPM | WEIGHT: 167 LBS | HEIGHT: 64 IN | DIASTOLIC BLOOD PRESSURE: 61 MMHG

## 2020-01-08 PROCEDURE — 99213 OFFICE O/P EST LOW 20 MIN: CPT | Performed by: ORTHOPAEDIC SURGERY

## 2020-01-08 RX ORDER — SERTRALINE HYDROCHLORIDE 25 MG/1
TABLET, FILM COATED ORAL
COMMUNITY
Start: 2019-12-16 | End: 2020-11-25 | Stop reason: SDUPTHER

## 2020-01-23 LAB
CHOLESTEROL, TOTAL: NORMAL
CHOLESTEROL/HDL RATIO: NORMAL
HDLC SERPL-MCNC: NORMAL MG/DL
LDL CHOLESTEROL CALCULATED: NORMAL
TRIGL SERPL-MCNC: NORMAL MG/DL
VLDLC SERPL CALC-MCNC: NORMAL MG/DL

## 2020-03-06 LAB

## 2020-03-19 VITALS
HEIGHT: 63 IN | SYSTOLIC BLOOD PRESSURE: 128 MMHG | WEIGHT: 169.5 LBS | HEART RATE: 75 BPM | DIASTOLIC BLOOD PRESSURE: 80 MMHG | TEMPERATURE: 98.4 F | BODY MASS INDEX: 30.03 KG/M2

## 2020-03-19 RX ORDER — RANITIDINE 150 MG/1
150 TABLET ORAL 2 TIMES DAILY
COMMUNITY
End: 2020-08-28 | Stop reason: ALTCHOICE

## 2020-05-22 VITALS
DIASTOLIC BLOOD PRESSURE: 60 MMHG | WEIGHT: 169 LBS | TEMPERATURE: 98.4 F | HEIGHT: 63 IN | BODY MASS INDEX: 29.95 KG/M2 | HEART RATE: 66 BPM | SYSTOLIC BLOOD PRESSURE: 112 MMHG

## 2020-07-27 ENCOUNTER — HOSPITAL ENCOUNTER (OUTPATIENT)
Age: 70
Discharge: HOME | End: 2020-07-29
Payer: MEDICARE

## 2020-07-27 ENCOUNTER — NURSE ONLY (OUTPATIENT)
Dept: FAMILY MEDICINE CLINIC | Age: 70
End: 2020-07-27

## 2020-07-27 VITALS — BODY MASS INDEX: 31.04 KG/M2 | WEIGHT: 175.2 LBS | TEMPERATURE: 97.3 F | HEIGHT: 63 IN

## 2020-07-27 PROCEDURE — 84439 ASSAY OF FREE THYROXINE: CPT

## 2020-07-27 PROCEDURE — 85025 COMPLETE CBC W/AUTO DIFF WBC: CPT

## 2020-07-27 PROCEDURE — 84443 ASSAY THYROID STIM HORMONE: CPT

## 2020-07-27 PROCEDURE — 80053 COMPREHEN METABOLIC PANEL: CPT

## 2020-07-27 PROCEDURE — 80061 LIPID PANEL: CPT

## 2020-07-28 LAB
ALBUMIN SERPL-MCNC: 4.4 G/DL (ref 3.5–5.2)
ALP BLD-CCNC: 87 U/L (ref 35–104)
ALT SERPL-CCNC: 13 U/L (ref 0–32)
ANION GAP SERPL CALCULATED.3IONS-SCNC: 16 MMOL/L (ref 7–16)
AST SERPL-CCNC: 20 U/L (ref 0–31)
BASOPHILS ABSOLUTE: 0.04 E9/L (ref 0–0.2)
BASOPHILS RELATIVE PERCENT: 0.9 % (ref 0–2)
BILIRUB SERPL-MCNC: 0.5 MG/DL (ref 0–1.2)
BUN BLDV-MCNC: 19 MG/DL (ref 8–23)
CALCIUM SERPL-MCNC: 9.8 MG/DL (ref 8.6–10.2)
CHLORIDE BLD-SCNC: 103 MMOL/L (ref 98–107)
CHOLESTEROL, FASTING: 133 MG/DL (ref 0–199)
CO2: 24 MMOL/L (ref 22–29)
CREAT SERPL-MCNC: 1.2 MG/DL (ref 0.5–1)
EOSINOPHILS ABSOLUTE: 0.11 E9/L (ref 0.05–0.5)
EOSINOPHILS RELATIVE PERCENT: 2.4 % (ref 0–6)
GFR AFRICAN AMERICAN: 54
GFR NON-AFRICAN AMERICAN: 45 ML/MIN/1.73
GLUCOSE BLD-MCNC: 82 MG/DL (ref 74–99)
HCT VFR BLD CALC: 47.2 % (ref 34–48)
HDLC SERPL-MCNC: 57 MG/DL
HEMOGLOBIN: 14.6 G/DL (ref 11.5–15.5)
IMMATURE GRANULOCYTES #: 0.01 E9/L
IMMATURE GRANULOCYTES %: 0.2 % (ref 0–5)
LDL CHOLESTEROL CALCULATED: 56 MG/DL (ref 0–99)
LYMPHOCYTES ABSOLUTE: 2.08 E9/L (ref 1.5–4)
LYMPHOCYTES RELATIVE PERCENT: 44.5 % (ref 20–42)
MCH RBC QN AUTO: 29.2 PG (ref 26–35)
MCHC RBC AUTO-ENTMCNC: 30.9 % (ref 32–34.5)
MCV RBC AUTO: 94.4 FL (ref 80–99.9)
MONOCYTES ABSOLUTE: 0.46 E9/L (ref 0.1–0.95)
MONOCYTES RELATIVE PERCENT: 9.9 % (ref 2–12)
NEUTROPHILS ABSOLUTE: 1.97 E9/L (ref 1.8–7.3)
NEUTROPHILS RELATIVE PERCENT: 42.1 % (ref 43–80)
PDW BLD-RTO: 13.7 FL (ref 11.5–15)
PLATELET # BLD: 233 E9/L (ref 130–450)
PMV BLD AUTO: 9.9 FL (ref 7–12)
POTASSIUM SERPL-SCNC: 3.7 MMOL/L (ref 3.5–5)
RBC # BLD: 5 E12/L (ref 3.5–5.5)
SODIUM BLD-SCNC: 143 MMOL/L (ref 132–146)
T4 FREE: 1.03 NG/DL (ref 0.93–1.7)
TOTAL PROTEIN: 7.1 G/DL (ref 6.4–8.3)
TRIGLYCERIDE, FASTING: 99 MG/DL (ref 0–149)
TSH SERPL DL<=0.05 MIU/L-ACNC: 2.54 UIU/ML (ref 0.27–4.2)
VLDLC SERPL CALC-MCNC: 20 MG/DL
WBC # BLD: 4.7 E9/L (ref 4.5–11.5)

## 2020-08-28 ENCOUNTER — OFFICE VISIT (OUTPATIENT)
Dept: FAMILY MEDICINE CLINIC | Age: 70
End: 2020-08-28
Payer: MEDICARE

## 2020-08-28 PROBLEM — I10 ESSENTIAL HYPERTENSION: Status: ACTIVE | Noted: 2020-08-28

## 2020-08-28 PROBLEM — K21.9 GASTROESOPHAGEAL REFLUX DISEASE WITHOUT ESOPHAGITIS: Status: ACTIVE | Noted: 2020-08-28

## 2020-08-28 PROBLEM — E78.2 MIXED HYPERLIPIDEMIA: Status: ACTIVE | Noted: 2020-08-28

## 2020-08-28 PROCEDURE — 99213 OFFICE O/P EST LOW 20 MIN: CPT | Performed by: FAMILY MEDICINE

## 2020-08-28 RX ORDER — NITROFURANTOIN 25; 75 MG/1; MG/1
100 CAPSULE ORAL 2 TIMES DAILY
Qty: 20 CAPSULE | Refills: 0 | Status: SHIPPED | OUTPATIENT
Start: 2020-08-28 | End: 2020-09-07

## 2020-08-28 RX ORDER — B-COMPLEX WITH VITAMIN C
1 TABLET ORAL DAILY
COMMUNITY

## 2020-08-28 RX ORDER — LEVOTHYROXINE SODIUM 25 MCG
25 TABLET ORAL DAILY
COMMUNITY
End: 2020-11-25 | Stop reason: SDUPTHER

## 2020-08-28 RX ORDER — PHENAZOPYRIDINE HYDROCHLORIDE 100 MG/1
100 TABLET, FILM COATED ORAL 3 TIMES DAILY PRN
Qty: 15 TABLET | Refills: 1 | Status: SHIPPED
Start: 2020-08-28 | End: 2022-05-04

## 2020-08-28 RX ORDER — TRIAMTERENE AND HYDROCHLOROTHIAZIDE 37.5; 25 MG/1; MG/1
1 TABLET ORAL DAILY
Qty: 90 TABLET | Refills: 1 | Status: SHIPPED
Start: 2020-08-28 | End: 2021-05-11 | Stop reason: SDUPTHER

## 2020-08-28 RX ORDER — PANTOPRAZOLE SODIUM 40 MG/1
40 TABLET, DELAYED RELEASE ORAL DAILY
Qty: 90 TABLET | Refills: 1 | Status: SHIPPED
Start: 2020-08-28 | End: 2021-01-05 | Stop reason: SDUPTHER

## 2020-08-28 ASSESSMENT — ENCOUNTER SYMPTOMS
SORE THROAT: 0
CONSTIPATION: 0
DIARRHEA: 0
RECTAL PAIN: 0
COUGH: 0
CHEST TIGHTNESS: 0
SHORTNESS OF BREATH: 0
RHINORRHEA: 0
VOICE CHANGE: 0
VOMITING: 0
NAUSEA: 0
ABDOMINAL DISTENTION: 0
PHOTOPHOBIA: 0
APNEA: 0
SINUS PAIN: 0
FACIAL SWELLING: 0
ABDOMINAL PAIN: 0
WHEEZING: 0

## 2020-08-28 ASSESSMENT — PATIENT HEALTH QUESTIONNAIRE - PHQ9
2. FEELING DOWN, DEPRESSED OR HOPELESS: 0
1. LITTLE INTEREST OR PLEASURE IN DOING THINGS: 0
SUM OF ALL RESPONSES TO PHQ QUESTIONS 1-9: 0
SUM OF ALL RESPONSES TO PHQ9 QUESTIONS 1 & 2: 0
SUM OF ALL RESPONSES TO PHQ QUESTIONS 1-9: 0
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
## 2020-09-30 ENCOUNTER — OFFICE VISIT (OUTPATIENT)
Dept: ORTHOPEDIC SURGERY | Age: 70
End: 2020-09-30
Payer: MEDICARE

## 2020-09-30 VITALS — BODY MASS INDEX: 31.01 KG/M2 | TEMPERATURE: 96.6 F | WEIGHT: 175 LBS | HEIGHT: 63 IN

## 2020-09-30 PROCEDURE — 99214 OFFICE O/P EST MOD 30 MIN: CPT | Performed by: ORTHOPAEDIC SURGERY

## 2020-09-30 NOTE — PROGRESS NOTES
Follow Up Visit     Almita Henderson returns today for follow up visit regarding right shoulder pain. Patient has history of right shoulder arthroscopy and rotator cuff repair in January 2019. She was last seen in January 2020 and was doing very well. She reports that 2 weeks ago she was playing pickle ball and started experiencing pain. She reports that symptoms have not been improving over the past 2 weeks. And is concerned that she may have re-torn her rotator cuff. Physical Exam:     Height: 5' 3\" (1.6 m), Weight: 175 lb (79.4 kg)    General: Alert and oriented x3, no acute distress  Cardiovascular/pulmonary: No labored breathing, peripheral perfusion intact  Musculoskeletal:    Right shoulder range of motion is 90/50/L1. Pain is present with mild weakness on Jobes and O'Briens exams. Speeds exam is intact. Belly press exam is intact. No swelling or deformity visualized on exam today. Mild tenderness over the greater tuberosity to palpation. Controlled Substances Monitoring:      Imaging:  No new imaging obtained today. Assessment: Right shoulder pain with history of massive rotator cuff repair almost 2 years ago      Plan: Today we discussed her right shoulder. Patient is 2 weeks out from reporting onset of symptoms after playing pickle ball. She has a loss of range of motion, when compared to her last visit. We discussed starting home exercises to work on range of motion and strengthening. If symptoms are not improving within the next 2 weeks she is instructed to call the office and an MRI of her right shoulder will be scheduled. Patient is to follow-up in 6 weeks. Patient verbalizes understanding. [facility]   9/30/20  3:36 PM    Staff Addendum    I have seen and evaluated the patient and agree with the assessment and plan as documented by Ned Longoria CNP.  I have performed the key components of the history and physical examination and concur with the findings and plan, and have made changes where appropriate/necessary.           Deloris Ferraro MD  [facility]

## 2020-10-09 RX ORDER — ALPRAZOLAM 1 MG/1
1 TABLET ORAL 2 TIMES DAILY PRN
Qty: 60 TABLET | Refills: 2 | Status: SHIPPED
Start: 2020-10-09 | End: 2022-04-18 | Stop reason: SDUPTHER

## 2020-10-29 ENCOUNTER — HOSPITAL ENCOUNTER (OUTPATIENT)
Age: 70
Discharge: HOME | End: 2020-10-31
Payer: MEDICARE

## 2020-10-29 ENCOUNTER — OFFICE VISIT (OUTPATIENT)
Dept: FAMILY MEDICINE CLINIC | Age: 70
End: 2020-10-29
Payer: MEDICARE

## 2020-10-29 VITALS
HEIGHT: 63 IN | BODY MASS INDEX: 31.63 KG/M2 | HEART RATE: 61 BPM | TEMPERATURE: 97.7 F | OXYGEN SATURATION: 98 % | DIASTOLIC BLOOD PRESSURE: 76 MMHG | WEIGHT: 178.5 LBS | SYSTOLIC BLOOD PRESSURE: 122 MMHG

## 2020-10-29 LAB
BILIRUBIN, POC: NEGATIVE
BLOOD URINE, POC: NEGATIVE
CLARITY, POC: NORMAL
COLOR, POC: NORMAL
GLUCOSE URINE, POC: NORMAL
KETONES, POC: NEGATIVE
LEUKOCYTE EST, POC: NORMAL
NITRITE, POC: POSITIVE
PH, POC: 6.5
PROTEIN, POC: NEGATIVE
SPECIFIC GRAVITY, POC: 1.02
UROBILINOGEN, POC: NORMAL

## 2020-10-29 PROCEDURE — 87088 URINE BACTERIA CULTURE: CPT

## 2020-10-29 PROCEDURE — 81002 URINALYSIS NONAUTO W/O SCOPE: CPT | Performed by: CLINICAL NURSE SPECIALIST

## 2020-10-29 PROCEDURE — 99212 OFFICE O/P EST SF 10 MIN: CPT | Performed by: CLINICAL NURSE SPECIALIST

## 2020-10-29 RX ORDER — CIPROFLOXACIN 500 MG/1
500 TABLET, FILM COATED ORAL 2 TIMES DAILY
Qty: 20 TABLET | Refills: 0 | Status: SHIPPED | OUTPATIENT
Start: 2020-10-29 | End: 2020-11-08

## 2020-10-29 RX ORDER — FLUCONAZOLE 150 MG/1
150 TABLET ORAL ONCE
Qty: 1 TABLET | Refills: 1 | Status: SHIPPED | OUTPATIENT
Start: 2020-10-29 | End: 2020-10-29

## 2020-10-29 ASSESSMENT — ENCOUNTER SYMPTOMS
GASTROINTESTINAL NEGATIVE: 1
ALLERGIC/IMMUNOLOGIC NEGATIVE: 1
RESPIRATORY NEGATIVE: 1
EYES NEGATIVE: 1

## 2020-10-29 NOTE — PROGRESS NOTES
Subjective:      Patient ID: Artem Beaver is a 72 y.o. female. Patient presented to the office today with a chief complaint of a urinary tract infection. Patient stated that she was treated recently with macrobid approximately a month ago and is still having symptoms. Patient stated that she is on pyridium. Patient stated that her symptoms worsened about 2 days ago and she felt \"crappy\" and had a headache. Patient complains of feeling very tired and is having urinary frequency. Patient denies abdominal or flank pain or dysuria but stated that she has an awareness of her urethra and dribbles when she urinates. Patient stated that she has a past medical history of bladder infections. Review of Systems   Constitutional: Positive for fatigue. HENT: Negative. Eyes: Negative. Respiratory: Negative. Cardiovascular: Negative. Gastrointestinal: Negative. Endocrine: Negative. Genitourinary: Positive for difficulty urinating and frequency. Musculoskeletal: Negative. Skin: Negative. Allergic/Immunologic: Negative. Neurological: Positive for headaches. Hematological: Negative. Psychiatric/Behavioral: Negative. Objective:   Physical Exam  Vitals signs and nursing note reviewed. Constitutional:       General: She is not in acute distress. Appearance: Normal appearance. She is normal weight. She is not ill-appearing, toxic-appearing or diaphoretic. HENT:      Head: Normocephalic. Right Ear: Tympanic membrane, ear canal and external ear normal. There is no impacted cerumen. Left Ear: Tympanic membrane, ear canal and external ear normal. There is no impacted cerumen. Nose: Nose normal. No congestion or rhinorrhea. Mouth/Throat:      Mouth: Mucous membranes are moist.      Pharynx: Oropharynx is clear. No oropharyngeal exudate or posterior oropharyngeal erythema. Eyes:      General: No scleral icterus. Right eye: No discharge. Left eye: No discharge. Extraocular Movements: Extraocular movements intact. Conjunctiva/sclera: Conjunctivae normal.      Pupils: Pupils are equal, round, and reactive to light. Neck:      Musculoskeletal: Normal range of motion and neck supple. No neck rigidity or muscular tenderness. Vascular: No carotid bruit. Cardiovascular:      Rate and Rhythm: Normal rate and regular rhythm. Pulses: Normal pulses. Heart sounds: Normal heart sounds. No murmur. No friction rub. No gallop. Pulmonary:      Effort: Pulmonary effort is normal. No respiratory distress. Breath sounds: Normal breath sounds. No stridor. No wheezing, rhonchi or rales. Chest:      Chest wall: No tenderness. Abdominal:      General: Abdomen is flat. Bowel sounds are normal. There is no distension. Palpations: Abdomen is soft. There is no mass. Tenderness: There is no abdominal tenderness. There is no right CVA tenderness, left CVA tenderness, guarding or rebound. Hernia: No hernia is present. Musculoskeletal: Normal range of motion. General: No swelling, tenderness, deformity or signs of injury. Right lower leg: No edema. Left lower leg: No edema. Lymphadenopathy:      Cervical: No cervical adenopathy. Skin:     General: Skin is warm and dry. Capillary Refill: Capillary refill takes less than 2 seconds. Coloration: Skin is not jaundiced or pale. Findings: No bruising, erythema, lesion or rash. Neurological:      General: No focal deficit present. Mental Status: She is alert and oriented to person, place, and time. Cranial Nerves: No cranial nerve deficit. Sensory: No sensory deficit. Motor: No weakness. Coordination: Coordination normal.      Gait: Gait normal.      Deep Tendon Reflexes: Reflexes normal.   Psychiatric:         Mood and Affect: Mood normal.         Behavior: Behavior normal.         Thought Content:  Thought content

## 2020-10-30 ENCOUNTER — OFFICE VISIT (OUTPATIENT)
Dept: FAMILY MEDICINE CLINIC | Age: 70
End: 2020-10-30
Payer: MEDICARE

## 2020-10-30 VITALS
WEIGHT: 178.8 LBS | SYSTOLIC BLOOD PRESSURE: 115 MMHG | DIASTOLIC BLOOD PRESSURE: 70 MMHG | BODY MASS INDEX: 35.1 KG/M2 | TEMPERATURE: 98.1 F | HEIGHT: 60 IN

## 2020-10-30 PROCEDURE — G0439 PPPS, SUBSEQ VISIT: HCPCS | Performed by: NURSE PRACTITIONER

## 2020-10-30 ASSESSMENT — PATIENT HEALTH QUESTIONNAIRE - PHQ9
SUM OF ALL RESPONSES TO PHQ QUESTIONS 1-9: 0
1. LITTLE INTEREST OR PLEASURE IN DOING THINGS: 0
SUM OF ALL RESPONSES TO PHQ QUESTIONS 1-9: 0
SUM OF ALL RESPONSES TO PHQ QUESTIONS 1-9: 0
2. FEELING DOWN, DEPRESSED OR HOPELESS: 0
SUM OF ALL RESPONSES TO PHQ9 QUESTIONS 1 & 2: 0

## 2020-10-30 NOTE — PROGRESS NOTES
(FIORICET, ESGIC) -40 MG per tablet Take 2 tablets by mouth every 4 hours as needed for Headaches Yes Deloris Chyna B Capal, DO   Estradiol-Norethindrone Acet (ACTIVELLA) 0.5-0.1 MG TABS Take 1 tablet by mouth daily Yes Historical Provider, MD   b complex vitamins capsule Take 1 capsule by mouth daily Ld 1/28/2019 Yes Historical Provider, MD   vitamin D3 (CHOLECALCIFEROL) 400 units TABS tablet Take 400 Units by mouth daily Ld 1/28/2019 Yes Historical Provider, MD   amLODIPine (NORVASC) 5 MG tablet Take 5 mg by mouth daily  Yes Historical Provider, MD   hyoscyamine (ANASPAZ;LEVSIN) 0.125 MG tablet Take 0.125 mg by mouth every 8 hours as needed for Cramping Yes Historical Provider, MD   multivitamin [facility]) per tablet Take 1 tablet by mouth daily Ld 1/28/2019 Yes Historical Provider, MD         Past Medical History:   Diagnosis Date    AC (acromioclavicular) arthritis     right shoulder    Anxiety     Diverticulitis     DJD (degenerative joint disease)     Esophagitis     GERD (gastroesophageal reflux disease)     Headache     Hiatal hernia     Hyperlipidemia     Hypertension     Hypothyroid     Hypothyroid     IBS (irritable bowel syndrome)     Impingement syndrome of right shoulder     right shoulder    PONV (postoperative nausea and vomiting)     Pre-operative clearance     medical and neuro for OR 1/31/2019.  SDH (subdural hematoma) (HCC)     h/o  follows with Dr Ximena Ortiz       Past Surgical History:   Procedure Laterality Date    ABDOMEN SURGERY      ABDOMINOPLASTY      BRAIN AVM REPAIR Left     BREAST REDUCTION SURGERY      BUNIONECTOMY Bilateral     COLONOSCOPY      COSMETIC SURGERY  2015    FACELIFT    FRACTURE SURGERY      OVARY REMOVAL Bilateral     RHINOPLASTY      SHOULDER ARTHROSCOPY Right 12/18/2015    SA decomp w/Michelle resect of dist clavicle/Inj for post-op pain HARISH Cotter MD    SHOULDER ARTHROSCOPY Right 1/31/2019    RIGHT SHOULDER ARTHROSCOPY ROTATOR CUFF REPAIR, SUBSCAPULAR REPAIR, BICEPS TENODESIS + ISB performed by Opal Coughlin MD at [address] ENDOSCOPY      VASCULAR SURGERY           Family History   Problem Relation Age of Onset    Prostate Cancer Father     Breast Cancer Sister        Briseyda (Including outside providers/suppliers regularly involved in providing care):   Patient Care Team:  Tadeo Oden DO as PCP - General (Family Medicine)  Tadeo Oden DO as PCP [facility]  DO Meche Robledo DO as Consulting Physician (Family Medicine)    Wt Readings from Last 3 Encounters:   10/30/20 178 lb 12.8 oz (81.1 kg)   10/29/20 178 lb 8 oz (81 kg)   09/30/20 175 lb (79.4 kg)     Vitals:    10/30/20 1039   BP: 115/70   Site: Left Upper Arm   Position: Sitting   Cuff Size: Large Adult   Temp: 98.1 °F (36.7 °C)   TempSrc: Temporal   Weight: 178 lb 12.8 oz (81.1 kg)   Height: 5' (1.524 m)     Body mass index is 34.92 kg/m². Based upon direct observation of the patient, evaluation of cognition reveals recent and remote memory intact.     General Appearance: alert and oriented to person, place and time, well developed and well- nourished, in no acute distress  Skin: warm and dry, no rash or erythema  Head: normocephalic and atraumatic  Eyes: pupils equal, round, and reactive to light, extraocular eye movements intact, conjunctivae normal  ENT: tympanic membrane, external ear and ear canal normal bilaterally, nose without deformity, nasal mucosa and turbinates normal without polyps  Neck: supple and non-tender without mass, no thyromegaly or thyroid nodules, no cervical lymphadenopathy  Pulmonary/Chest: clear to auscultation bilaterally- no wheezes, rales or rhonchi, normal air movement, no respiratory distress  Cardiovascular: normal rate, regular rhythm, normal S1 and S2, no murmurs, rubs, clicks, or gallops, distal pulses intact, no carotid bruits  Abdomen: soft, non-tender, non-distended, normal bowel sounds, no masses or organomegaly  Extremities: no cyanosis, clubbing or edema  Musculoskeletal: normal range of motion, no joint swelling, deformity or tenderness  Neurologic: reflexes normal and symmetric, no cranial nerve deficit, gait, coordination and speech normal      Patient's complete Health Risk Assessment and screening values have been reviewed and are found in Flowsheets. The following problems were reviewed today and where indicated follow up appointments were made and/or referrals ordered. Positive Risk Factor Screenings with Interventions:     Health Habits/Nutrition:     Body mass index: (!) 34.92  Health Habits/Nutrition Interventions:  · Inadequate physical activity:  educational materials provided to promote increased physical activity  · Nutritional issues:  educational materials for healthy, well-balanced diet provided    Personalized Preventive Plan   Current Health Maintenance Status  Immunization History   Administered Date(s) Administered    PPD Test 09/06/2018    Tdap (Boostrix, Adacel) 05/07/2020        Health Maintenance   Topic Date Due    Hepatitis C screen  1950    Shingles Vaccine (1 of 2) 06/22/2001    Pneumococcal 65+ years Vaccine (1 of 1 - PPSV23) 06/22/2016    Annual Wellness Visit (AWV)  06/23/2019    Breast cancer screen  08/26/2020    Flu vaccine (1) 09/01/2020    Lipid screen  07/27/2021    Potassium monitoring  07/27/2021    Creatinine monitoring  07/27/2021    Colon cancer screen colonoscopy  03/06/2028    DTaP/Tdap/Td vaccine (2 - Td) 05/07/2030    DEXA (modify frequency per FRAX score)  Completed    Hepatitis A vaccine  Aged Out    Hepatitis B vaccine  Aged Out    Hib vaccine  Aged Out    Meningococcal (ACWY) vaccine  Aged Out     Recommendations for foc.us Due: see orders and patient instructions/AVS.  .   Recommended screening schedule for the next 5-10 years is provided to the patient in written form:

## 2020-10-30 NOTE — PATIENT INSTRUCTIONS
Body Mass Index: Care Instructions  Your Care Instructions     Body mass index (BMI) can help you see if your weight is raising your risk for health problems. It uses a formula to compare how much you weigh with how tall you are. · A BMI lower than 18.5 is considered underweight. · A BMI between 18.5 and 24.9 is considered healthy. · A BMI between 25 and 29.9 is considered overweight. A BMI of 30 or higher is considered obese. If your BMI is in the normal range, it means that you have a lower risk for weight-related health problems. If your BMI is in the overweight or obese range, you may be at increased risk for weight-related health problems, such as high blood pressure, heart disease, stroke, arthritis or joint pain, and diabetes. If your BMI is in the underweight range, you may be at increased risk for health problems such as fatigue, lower protection (immunity) against illness, muscle loss, bone loss, hair loss, and hormone problems. BMI is just one measure of your risk for weight-related health problems. You may be at higher risk for health problems if you are not active, you eat an unhealthy diet, or you drink too much alcohol or use tobacco products. Follow-up care is a key part of your treatment and safety. Be sure to make and go to all appointments, and call your doctor if you are having problems. It's also a good idea to know your test results and keep a list of the medicines you take. How can you care for yourself at home? · Practice healthy eating habits. This includes eating plenty of fruits, vegetables, whole grains, lean protein, and low-fat dairy. · If your doctor recommends it, get more exercise. Walking is a good choice. Bit by bit, increase the amount you walk every day. Try for at least 30 minutes on most days of the week. · Do not smoke. Smoking can increase your risk for health problems. If you need help quitting, talk to your doctor about stop-smoking programs and medicines. These can increase your chances of quitting for good. · Limit alcohol to 2 drinks a day for men and 1 drink a day for women. Too much alcohol can cause health problems. If you have a BMI higher than 25  · Your doctor may do other tests to check your risk for weight-related health problems. This may include measuring the distance around your waist. A waist measurement of more than 40 inches in men or 35 inches in women can increase the risk of weight-related health problems. · Talk with your doctor about steps you can take to stay healthy or improve your health. You may need to make lifestyle changes to lose weight and stay healthy, such as changing your diet and getting regular exercise. If you have a BMI lower than 18.5  · Your doctor may do other tests to check your risk for health problems. · Talk with your doctor about steps you can take to stay healthy or improve your health. You may need to make lifestyle changes to gain or maintain weight and stay healthy, such as getting more healthy foods in your diet and doing exercises to build muscle. Where can you learn more? Go to https://FansUniteeliz.[employer]. org and sign in to your PulseSocks account. Enter S176 in the Flavours box to learn more about \"Body Mass Index: Care Instructions. \"     If you do not have an account, please click on the \"Sign Up Now\" link. Current as of: December 11, 2019               Content Version: 12.6  © 8301-2200 [employer]. Care instructions adapted under license by [facility] ([facility]). If you have questions about a medical condition or this instruction, always ask your healthcare professional. [place] [address] any warranty or liability for your use of this information. Eating Healthy Foods: Care Instructions  Your Care Instructions     Eating healthy foods can help lower your risk for disease.  Healthy food gives you energy and keeps your heart strong, your brain active, your muscles working, and your bones strong. A healthy diet includes a variety of foods from the basic food groups: grains, vegetables, fruits, milk and milk products, and meat and beans. Some people may eat more of their favorite foods from only one food group and, as a result, miss getting the nutrients they need. So, it is important to pay attention not only to what you eat but also to what you are missing from your diet. You can eat a healthy, balanced diet by making a few small changes. Follow-up care is a key part of your treatment and safety. Be sure to make and go to all appointments, and call your doctor if you are having problems. It's also a good idea to know your test results and keep a list of the medicines you take. How can you care for yourself at home? Look at what you eat  · Keep a food diary for a week or two and record everything you eat or drink. Track the number of servings you eat from each food group. · For a balanced diet every day, eat a variety of:  ? 6 or more ounce-equivalents of grains, such as cereals, breads, crackers, rice, or pasta, every day. An ounce-equivalent is 1 slice of bread, 1 cup of ready-to-eat cereal, or ½ cup of cooked rice, cooked pasta, or cooked cereal.  ? 2½ cups of vegetables, especially:  § Dark-green vegetables such as broccoli and spinach. § Orange vegetables such as carrots and sweet potatoes. § Dry beans (such as schumacher and kidney beans) and peas (such as lentils). ? 2 cups of fresh, frozen, or canned fruit. A small apple or 1 banana or orange equals 1 cup. ? 3 cups of nonfat or low-fat milk, yogurt, or other milk products. ? 5½ ounces of meat and beans, such as chicken, fish, lean meat, beans, nuts, and seeds. One egg, 1 tablespoon of peanut butter, ½ ounce nuts or seeds, or ¼ cup of cooked beans equals 1 ounce of meat. · Learn how to read food labels for serving sizes and ingredients.  Fast-food and convenience-food meals often contain few or no fruits or vegetables. Make sure you eat some fruits and vegetables to make the meal more nutritious. · Look at your food diary. For each food group, add up what you have eaten and then divide the total by the number of days. This will give you an idea of how much you are eating from each food group. See if you can find some ways to change your diet to make it more healthy. Start small  · Do not try to make dramatic changes to your diet all at once. You might feel that you are missing out on your favorite foods and then be more likely to fail. · Start slowly, and gradually change your habits. Try some of the following:  ? Use whole wheat bread instead of white bread. ? Use nonfat or low-fat milk instead of whole milk. ? Eat brown rice instead of white rice, and eat whole wheat pasta instead of white-flour pasta. ? Try low-fat cheeses and low-fat yogurt. ? Add more fruits and vegetables to meals and have them for snacks. ? Add lettuce, tomato, cucumber, and onion to sandwiches. ? Add fruit to yogurt and cereal.  Enjoy food  · You can still eat your favorite foods. You just may need to eat less of them. If your favorite foods are high in fat, salt, and sugar, limit how often you eat them, but do not cut them out entirely. · Eat a wide variety of foods. Make healthy choices when eating out  · The type of restaurant you choose can help you make healthy choices. Even fast-food chains are now offering more low-fat or healthier choices on the menu. · Choose smaller portions, or take half of your meal home. · When eating out, try:  ? A veggie pizza with a whole wheat crust or grilled chicken (instead of sausage or pepperoni). ? Pasta with roasted vegetables, grilled chicken, or marinara sauce instead of cream sauce. ? A vegetable wrap or grilled chicken wrap. ? Broiled or poached food instead of fried or breaded items.   Make healthy choices easy  · Buy packaged, prewashed, ready-to-eat fresh vegetables and fruits, such as baby carrots, salad mixes, and chopped or shredded broccoli and cauliflower. · Buy packaged, presliced fruits, such as melon or pineapple. · Choose 100% fruit or vegetable juice instead of soda. Limit juice intake to 4 to 6 oz (½ to ¾ cup) a day. · Blend low-fat yogurt, fruit juice, and canned or frozen fruit to make a smoothie for breakfast or a snack. Where can you learn more? Go to https://[employer]ar[employer]oleb.eSight. org and sign in to your Wear account. Enter E422 in the TheraVida box to learn more about \"Eating Healthy Foods: Care Instructions. \"     If you do not have an account, please click on the \"Sign Up Now\" link. Current as of: August 22, 2019               Content Version: 12.6  © 8229-9145 [employer]. Care instructions adapted under license by [facility] ([facility]). If you have questions about a medical condition or this instruction, always ask your healthcare professional. [place] [address] any warranty or liability for your use of this information. Personalized Preventive Plan for Opal Kevin - 10/30/2020  Medicare offers a range of preventive health benefits. Some of the tests and screenings are paid in full while other may be subject to a deductible, co-insurance, and/or copay. Some of these benefits include a comprehensive review of your medical history including lifestyle, illnesses that may run in your family, and various assessments and screenings as appropriate. After reviewing your medical record and screening and assessments performed today your provider may have ordered immunizations, labs, imaging, and/or referrals for you. A list of these orders (if applicable) as well as your Preventive Care list are included within your After Visit Summary for your review.     Other Preventive Recommendations:    · A preventive eye exam performed by an eye specialist is recommended every 1-2 years to screen for glaucoma; cataracts, macular degeneration, and other eye disorders. · A preventive dental visit is recommended every 6 months. · Try to get at least 150 minutes of exercise per week or 10,000 steps per day on a pedometer . · Order or download the FREE \"Exercise & Physical Activity: Your Everyday Guide\" from [employer]. Call 2-509.334.3452 or search [employer] online. · You need 1043-3645 mg of calcium and 1220-5066 IU of vitamin D per day. It is possible to meet your calcium requirement with diet alone, but a vitamin D supplement is usually necessary to meet this goal.  · When exposed to the sun, use a sunscreen that protects against both UVA and UVB radiation with an SPF of 30 or greater. Reapply every 2 to 3 hours or after sweating, drying off with a towel, or swimming. · Always wear a seat belt when traveling in a car. Always wear a helmet when riding a bicycle or motorcycle.

## 2020-11-01 LAB — URINE CULTURE, ROUTINE: NORMAL

## 2020-11-13 NOTE — PROGRESS NOTES
Pt called stating that she would like to proceed with the MRI, pain is not subsiding. MRI ordered. Will call the office once testing are done.

## 2020-11-24 ENCOUNTER — NURSE ONLY (OUTPATIENT)
Dept: FAMILY MEDICINE CLINIC | Age: 70
End: 2020-11-24
Payer: MEDICARE

## 2020-11-24 ENCOUNTER — HOSPITAL ENCOUNTER (OUTPATIENT)
Dept: MRI IMAGING | Age: 70
Discharge: HOME | End: 2020-11-26
Payer: MEDICARE

## 2020-11-24 VITALS — TEMPERATURE: 97.2 F

## 2020-11-24 DIAGNOSIS — E03.9 HYPOTHYROIDISM, UNSPECIFIED TYPE: ICD-10-CM

## 2020-11-24 DIAGNOSIS — I10 ESSENTIAL HYPERTENSION: ICD-10-CM

## 2020-11-24 DIAGNOSIS — E78.2 MIXED HYPERLIPIDEMIA: ICD-10-CM

## 2020-11-24 LAB
ALBUMIN SERPL-MCNC: 4.5 G/DL (ref 3.5–5.2)
ALP BLD-CCNC: 89 U/L (ref 35–104)
ALT SERPL-CCNC: 16 U/L (ref 0–32)
ANION GAP SERPL CALCULATED.3IONS-SCNC: 15 MMOL/L (ref 7–16)
AST SERPL-CCNC: 19 U/L (ref 0–31)
BASOPHILS ABSOLUTE: 0.07 E9/L (ref 0–0.2)
BASOPHILS RELATIVE PERCENT: 1.4 % (ref 0–2)
BILIRUB SERPL-MCNC: 0.5 MG/DL (ref 0–1.2)
BUN BLDV-MCNC: 18 MG/DL (ref 8–23)
CALCIUM SERPL-MCNC: 10 MG/DL (ref 8.6–10.2)
CHLORIDE BLD-SCNC: 102 MMOL/L (ref 98–107)
CHOLESTEROL, TOTAL: 139 MG/DL (ref 0–199)
CO2: 25 MMOL/L (ref 22–29)
CREAT SERPL-MCNC: 1.2 MG/DL (ref 0.5–1)
EOSINOPHILS ABSOLUTE: 0.12 E9/L (ref 0.05–0.5)
EOSINOPHILS RELATIVE PERCENT: 2.4 % (ref 0–6)
GFR AFRICAN AMERICAN: 54
GFR NON-AFRICAN AMERICAN: 44 ML/MIN/1.73
GLUCOSE BLD-MCNC: 86 MG/DL (ref 74–99)
HCT VFR BLD CALC: 47.3 % (ref 34–48)
HDLC SERPL-MCNC: 61 MG/DL
HEMOGLOBIN: 14.8 G/DL (ref 11.5–15.5)
IMMATURE GRANULOCYTES #: 0 E9/L
IMMATURE GRANULOCYTES %: 0 % (ref 0–5)
LDL CHOLESTEROL CALCULATED: 56 MG/DL (ref 0–99)
LYMPHOCYTES ABSOLUTE: 2.27 E9/L (ref 1.5–4)
LYMPHOCYTES RELATIVE PERCENT: 44.8 % (ref 20–42)
MCH RBC QN AUTO: 28.1 PG (ref 26–35)
MCHC RBC AUTO-ENTMCNC: 31.3 % (ref 32–34.5)
MCV RBC AUTO: 89.9 FL (ref 80–99.9)
MONOCYTES ABSOLUTE: 0.46 E9/L (ref 0.1–0.95)
MONOCYTES RELATIVE PERCENT: 9.1 % (ref 2–12)
NEUTROPHILS ABSOLUTE: 2.15 E9/L (ref 1.8–7.3)
NEUTROPHILS RELATIVE PERCENT: 42.3 % (ref 43–80)
PDW BLD-RTO: 13.2 FL (ref 11.5–15)
PLATELET # BLD: 258 E9/L (ref 130–450)
PMV BLD AUTO: 10.3 FL (ref 7–12)
POTASSIUM SERPL-SCNC: 3.7 MMOL/L (ref 3.5–5)
RBC # BLD: 5.26 E12/L (ref 3.5–5.5)
SODIUM BLD-SCNC: 142 MMOL/L (ref 132–146)
T4 FREE: 1.17 NG/DL (ref 0.93–1.7)
TOTAL PROTEIN: 7.4 G/DL (ref 6.4–8.3)
TRIGL SERPL-MCNC: 111 MG/DL (ref 0–149)
TSH SERPL DL<=0.05 MIU/L-ACNC: 2.81 UIU/ML (ref 0.27–4.2)
VLDLC SERPL CALC-MCNC: 22 MG/DL
WBC # BLD: 5.1 E9/L (ref 4.5–11.5)

## 2020-11-24 PROCEDURE — 36415 COLL VENOUS BLD VENIPUNCTURE: CPT | Performed by: FAMILY MEDICINE

## 2020-11-24 PROCEDURE — 73221 MRI JOINT UPR EXTREM W/O DYE: CPT

## 2020-11-25 ENCOUNTER — OFFICE VISIT (OUTPATIENT)
Dept: FAMILY MEDICINE CLINIC | Age: 70
End: 2020-11-25
Payer: MEDICARE

## 2020-11-25 VITALS
OXYGEN SATURATION: 97 % | WEIGHT: 178 LBS | BODY MASS INDEX: 34.95 KG/M2 | HEART RATE: 61 BPM | DIASTOLIC BLOOD PRESSURE: 80 MMHG | TEMPERATURE: 97.8 F | HEIGHT: 60 IN | SYSTOLIC BLOOD PRESSURE: 120 MMHG | RESPIRATION RATE: 18 BRPM

## 2020-11-25 PROCEDURE — 99214 OFFICE O/P EST MOD 30 MIN: CPT | Performed by: FAMILY MEDICINE

## 2020-11-25 RX ORDER — AMLODIPINE BESYLATE 5 MG/1
5 TABLET ORAL DAILY
Qty: 90 TABLET | Refills: 1 | Status: SHIPPED
Start: 2020-11-25 | End: 2021-08-27 | Stop reason: SDUPTHER

## 2020-11-25 RX ORDER — SERTRALINE HYDROCHLORIDE 25 MG/1
25 TABLET, FILM COATED ORAL DAILY
Qty: 90 TABLET | Refills: 1 | Status: SHIPPED
Start: 2020-11-25 | End: 2021-05-11 | Stop reason: SDUPTHER

## 2020-11-25 RX ORDER — LEVOTHYROXINE SODIUM 25 MCG
25 TABLET ORAL DAILY
Qty: 90 TABLET | Refills: 1 | Status: SHIPPED
Start: 2020-11-25 | End: 2021-05-11 | Stop reason: SDUPTHER

## 2020-11-25 ASSESSMENT — ENCOUNTER SYMPTOMS
NAUSEA: 0
CHEST TIGHTNESS: 0
APNEA: 0
WHEEZING: 0
RHINORRHEA: 0
SORE THROAT: 0
CONSTIPATION: 0
SHORTNESS OF BREATH: 0
RECTAL PAIN: 0
PHOTOPHOBIA: 0
ABDOMINAL DISTENTION: 0
FACIAL SWELLING: 0
BACK PAIN: 0
SINUS PAIN: 1
VOMITING: 0
DIARRHEA: 0
ABDOMINAL PAIN: 0
COUGH: 0
VOICE CHANGE: 0
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
## 2020-12-02 ENCOUNTER — OFFICE VISIT (OUTPATIENT)
Dept: ORTHOPEDIC SURGERY | Age: 70
End: 2020-12-02
Payer: MEDICARE

## 2020-12-02 VITALS — TEMPERATURE: 97.1 F | HEIGHT: 60 IN | WEIGHT: 178 LBS | BODY MASS INDEX: 34.95 KG/M2

## 2020-12-02 PROCEDURE — 20610 DRAIN/INJ JOINT/BURSA W/O US: CPT | Performed by: ORTHOPAEDIC SURGERY

## 2020-12-02 PROCEDURE — 99213 OFFICE O/P EST LOW 20 MIN: CPT | Performed by: ORTHOPAEDIC SURGERY

## 2020-12-02 RX ORDER — LIDOCAINE HYDROCHLORIDE 10 MG/ML
4 INJECTION, SOLUTION INFILTRATION; PERINEURAL ONCE
Status: COMPLETED | OUTPATIENT
Start: 2020-12-02 | End: 2020-12-02

## 2020-12-02 RX ORDER — TRIAMCINOLONE ACETONIDE 40 MG/ML
40 INJECTION, SUSPENSION INTRA-ARTICULAR; INTRAMUSCULAR ONCE
Status: COMPLETED | OUTPATIENT
Start: 2020-12-02 | End: 2020-12-02

## 2020-12-02 RX ADMIN — TRIAMCINOLONE ACETONIDE 40 MG: 40 INJECTION, SUSPENSION INTRA-ARTICULAR; INTRAMUSCULAR at 14:47

## 2020-12-02 RX ADMIN — LIDOCAINE HYDROCHLORIDE 4 ML: 10 INJECTION, SOLUTION INFILTRATION; PERINEURAL at 14:46

## 2021-01-05 RX ORDER — PANTOPRAZOLE SODIUM 40 MG/1
40 TABLET, DELAYED RELEASE ORAL DAILY
Qty: 90 TABLET | Refills: 1 | Status: SHIPPED
Start: 2021-01-05 | End: 2021-08-27 | Stop reason: SDUPTHER

## 2021-01-15 ENCOUNTER — OFFICE VISIT (OUTPATIENT)
Dept: ORTHOPEDIC SURGERY | Age: 71
End: 2021-01-15
Payer: MEDICARE

## 2021-01-15 VITALS — WEIGHT: 178 LBS | BODY MASS INDEX: 34.95 KG/M2 | HEIGHT: 60 IN | TEMPERATURE: 96.9 F

## 2021-01-15 DIAGNOSIS — Z98.890 S/P ARTHROSCOPY OF RIGHT SHOULDER: Primary | ICD-10-CM

## 2021-01-15 PROCEDURE — 99213 OFFICE O/P EST LOW 20 MIN: CPT | Performed by: ORTHOPAEDIC SURGERY

## 2021-01-15 NOTE — PROGRESS NOTES
Follow Up Visit     Reji Butler returns today for follow up visit regarding right shoulder pain. Patient has history of right shoulder arthroscopy and rotator cuff repair in January 2019. Patient had onset of symptoms in September following an injury playing pickle ball. Patient has can continue his pain and some loss of range of motion. She had a cortisone injection 12/2/20, with good improvement. MRI around that time showed some thinning of the supraspinatus but no new tear. She also did some home therapy. She states overall she feels improved, she has minimal issues as far as her range of motion is concerned. Physical Exam:     Height: 5' (1.524 m), Weight: 178 lb (80.7 kg)    General: Alert and oriented x3, no acute distress  Cardiovascular/pulmonary: No labored breathing, peripheral perfusion intact  Musculoskeletal:    Exam of the shoulder shows range of motion about 140/45/T6. There is some mild weakness but no pain with supraspinatus testing. Belly press is intact    Controlled Substances Monitoring:      Imaging:  No new images. Previous images reviewed      Assessment: Status post right shoulder rotator cuff repair 2 years ago, recent onset of pain that has improved with conservative treatment      Plan:   She will continue to progress with home exercises and activity as tolerated. We will see her back in about 3 months to reassess.     Eryn Lang MD  Orthopaedic Surgery   1/15/21  9:29 AM

## 2021-01-28 RX ORDER — ROSUVASTATIN CALCIUM 10 MG/1
TABLET, COATED ORAL
Qty: 90 TABLET | Refills: 1 | Status: SHIPPED
Start: 2021-01-28 | End: 2021-08-27 | Stop reason: SDUPTHER

## 2021-02-15 ENCOUNTER — TELEPHONE (OUTPATIENT)
Dept: FAMILY MEDICINE CLINIC | Age: 71
End: 2021-02-15

## 2021-03-04 ENCOUNTER — IMMUNIZATION (OUTPATIENT)
Dept: PRIMARY CARE CLINIC | Age: 71
End: 2021-03-04
Payer: MEDICARE

## 2021-03-04 PROCEDURE — 91300 COVID-19, PFIZER VACCINE 30MCG/0.3ML DOSE: CPT | Performed by: NURSE PRACTITIONER

## 2021-03-04 PROCEDURE — 0001A COVID-19, PFIZER VACCINE 30MCG/0.3ML DOSE: CPT | Performed by: NURSE PRACTITIONER

## 2021-03-30 ENCOUNTER — IMMUNIZATION (OUTPATIENT)
Dept: PRIMARY CARE CLINIC | Age: 71
End: 2021-03-30
Payer: MEDICARE

## 2021-03-30 PROCEDURE — 91300 COVID-19, PFIZER VACCINE 30MCG/0.3ML DOSE: CPT | Performed by: NURSE PRACTITIONER

## 2021-03-30 PROCEDURE — 0002A COVID-19, PFIZER VACCINE 30MCG/0.3ML DOSE: CPT | Performed by: NURSE PRACTITIONER

## 2021-04-16 ENCOUNTER — OFFICE VISIT (OUTPATIENT)
Dept: ORTHOPEDIC SURGERY | Age: 71
End: 2021-04-16
Payer: MEDICARE

## 2021-04-16 VITALS — HEIGHT: 64 IN | WEIGHT: 178 LBS | BODY MASS INDEX: 30.39 KG/M2

## 2021-04-16 DIAGNOSIS — Z98.890 S/P ARTHROSCOPY OF RIGHT SHOULDER: Primary | ICD-10-CM

## 2021-04-16 PROCEDURE — 20610 DRAIN/INJ JOINT/BURSA W/O US: CPT | Performed by: ORTHOPAEDIC SURGERY

## 2021-04-16 PROCEDURE — 99213 OFFICE O/P EST LOW 20 MIN: CPT | Performed by: ORTHOPAEDIC SURGERY

## 2021-04-16 RX ORDER — TRIAMCINOLONE ACETONIDE 40 MG/ML
40 INJECTION, SUSPENSION INTRA-ARTICULAR; INTRAMUSCULAR ONCE
Status: COMPLETED | OUTPATIENT
Start: 2021-04-16 | End: 2021-04-16

## 2021-04-16 RX ORDER — ALPRAZOLAM 1 MG/1
TABLET ORAL
COMMUNITY
Start: 2021-01-18 | End: 2021-05-24 | Stop reason: SDUPTHER

## 2021-04-16 RX ORDER — LIDOCAINE HYDROCHLORIDE 10 MG/ML
4 INJECTION, SOLUTION INFILTRATION; PERINEURAL ONCE
Status: COMPLETED | OUTPATIENT
Start: 2021-04-16 | End: 2021-04-16

## 2021-04-16 RX ADMIN — TRIAMCINOLONE ACETONIDE 40 MG: 40 INJECTION, SUSPENSION INTRA-ARTICULAR; INTRAMUSCULAR at 13:09

## 2021-04-16 RX ADMIN — LIDOCAINE HYDROCHLORIDE 4 ML: 10 INJECTION, SOLUTION INFILTRATION; PERINEURAL at 13:08

## 2021-04-16 NOTE — PROGRESS NOTES
Follow Up Visit     Layton Bob returns today for follow up visit regarding Status post right shoulder rotator cuff repair 2 years ago, recent onset of pain that has improved with conservative treatment. Treatment thus far has included continue to progress with home exercises and activity as tolerated. She reports symptoms are improved but still having pain and catching in the shoulder. Physical Exam:     No data recorded    General: Alert and oriented x3, no acute distress  Cardiovascular/pulmonary: No labored breathing, peripheral perfusion intact  Musculoskeletal:    Exam of the right shoulder shows full range of motion. There is mild restrictions with belly press test but minimal pain. There is mild weakness and pain with supraspinatus testing. There is some mild tenderness over the anterior shoulder    Controlled Substances Monitoring:      Imaging:  No new images. Previous MRI reviewed showing intact repair of supraspinatus and subscapularis. There is evidence of some full-thickness cartilage loss along the humeral head    Procedure Note:  Right Shoulder steroid injection     The Right shoulder was identified as the injection site. The risk and benefits of a cortisone injection were explained and the patient consented to the injection. Under sterile conditions, the subacromial space was injected from posterior approach with a mixture of 40 mg of Kenalog, 4 cc  1% Lidocaine without complication. A sterile bandage was applied.     Administrations This Visit     lidocaine 1 % injection 4 mL     Admin Date  04/16/2021 Action  Given Dose  4 mL Route  Intra-articular Administered By  Pia Theodore RN          triamcinolone acetonide VIA [facility]) injection 40 mg     Admin Date  04/16/2021 Action  Given Dose  40 mg Route  Intra-articular Administered By  Pia Theodore RN                  Assessment: History of rotator cuff repair right side over 2 years ago, with recent recurrence of pain, MRI showing intact cuff, evidence of some mild arthritic changes      Plan:   Discussed her shoulder again today. She had a steroid shot back in December that helped. She would like to try another one which I think is reasonable. She will keep going on exercises. We will see her back in 3 months to reassess. We discussed potential surgical management options if she continues to have pain.     Pacheco Alberts MD  Orthopaedic Surgery   4/16/21  9:34 AM

## 2021-05-11 ENCOUNTER — NURSE ONLY (OUTPATIENT)
Dept: FAMILY MEDICINE CLINIC | Age: 71
End: 2021-05-11
Payer: MEDICARE

## 2021-05-11 DIAGNOSIS — E78.2 MIXED HYPERLIPIDEMIA: ICD-10-CM

## 2021-05-11 DIAGNOSIS — R53.82 CHRONIC FATIGUE: ICD-10-CM

## 2021-05-11 DIAGNOSIS — M25.561 ACUTE PAIN OF RIGHT KNEE: ICD-10-CM

## 2021-05-11 DIAGNOSIS — I10 ESSENTIAL HYPERTENSION, MALIGNANT: ICD-10-CM

## 2021-05-11 DIAGNOSIS — I10 ESSENTIAL HYPERTENSION: ICD-10-CM

## 2021-05-11 DIAGNOSIS — E78.5 HYPERLIPIDEMIA, UNSPECIFIED HYPERLIPIDEMIA TYPE: ICD-10-CM

## 2021-05-11 DIAGNOSIS — I62.00 SUBDURAL HEMORRHAGE (HCC): ICD-10-CM

## 2021-05-11 DIAGNOSIS — K21.9 GASTROESOPHAGEAL REFLUX DISEASE WITHOUT ESOPHAGITIS: ICD-10-CM

## 2021-05-11 LAB
ALBUMIN SERPL-MCNC: 4.1 G/DL (ref 3.5–5.2)
ALP BLD-CCNC: 74 U/L (ref 35–104)
ALT SERPL-CCNC: 30 U/L (ref 0–32)
ANION GAP SERPL CALCULATED.3IONS-SCNC: 10 MMOL/L (ref 7–16)
AST SERPL-CCNC: 19 U/L (ref 0–31)
BASOPHILS ABSOLUTE: 0.06 E9/L (ref 0–0.2)
BASOPHILS RELATIVE PERCENT: 1 % (ref 0–2)
BILIRUB SERPL-MCNC: 0.6 MG/DL (ref 0–1.2)
BUN BLDV-MCNC: 18 MG/DL (ref 6–23)
CALCIUM SERPL-MCNC: 9.7 MG/DL (ref 8.6–10.2)
CHLORIDE BLD-SCNC: 101 MMOL/L (ref 98–107)
CHOLESTEROL, TOTAL: 144 MG/DL (ref 0–199)
CO2: 27 MMOL/L (ref 22–29)
CREAT SERPL-MCNC: 1 MG/DL (ref 0.5–1)
EOSINOPHILS ABSOLUTE: 0.06 E9/L (ref 0.05–0.5)
EOSINOPHILS RELATIVE PERCENT: 1 % (ref 0–6)
GFR AFRICAN AMERICAN: >60
GFR NON-AFRICAN AMERICAN: 55 ML/MIN/1.73
GLUCOSE BLD-MCNC: 79 MG/DL (ref 74–99)
HCT VFR BLD CALC: 46.9 % (ref 34–48)
HDLC SERPL-MCNC: 63 MG/DL
HEMOGLOBIN: 14.8 G/DL (ref 11.5–15.5)
IMMATURE GRANULOCYTES #: 0.01 E9/L
IMMATURE GRANULOCYTES %: 0.2 % (ref 0–5)
LDL CHOLESTEROL CALCULATED: 67 MG/DL (ref 0–99)
LYMPHOCYTES ABSOLUTE: 2.22 E9/L (ref 1.5–4)
LYMPHOCYTES RELATIVE PERCENT: 36.8 % (ref 20–42)
MCH RBC QN AUTO: 29.4 PG (ref 26–35)
MCHC RBC AUTO-ENTMCNC: 31.6 % (ref 32–34.5)
MCV RBC AUTO: 93.2 FL (ref 80–99.9)
MONOCYTES ABSOLUTE: 0.51 E9/L (ref 0.1–0.95)
MONOCYTES RELATIVE PERCENT: 8.5 % (ref 2–12)
NEUTROPHILS ABSOLUTE: 3.17 E9/L (ref 1.8–7.3)
NEUTROPHILS RELATIVE PERCENT: 52.5 % (ref 43–80)
PDW BLD-RTO: 13.4 FL (ref 11.5–15)
PLATELET # BLD: 256 E9/L (ref 130–450)
PMV BLD AUTO: 9.7 FL (ref 7–12)
POTASSIUM SERPL-SCNC: 4.2 MMOL/L (ref 3.5–5)
RBC # BLD: 5.03 E12/L (ref 3.5–5.5)
SODIUM BLD-SCNC: 138 MMOL/L (ref 132–146)
TOTAL PROTEIN: 7 G/DL (ref 6.4–8.3)
TRIGL SERPL-MCNC: 71 MG/DL (ref 0–149)
TSH SERPL DL<=0.05 MIU/L-ACNC: 1.95 UIU/ML (ref 0.27–4.2)
VLDLC SERPL CALC-MCNC: 14 MG/DL
WBC # BLD: 6 E9/L (ref 4.5–11.5)

## 2021-05-11 PROCEDURE — 99999 PR OFFICE/OUTPT VISIT,PROCEDURE ONLY: CPT | Performed by: FAMILY MEDICINE

## 2021-05-11 PROCEDURE — 36415 COLL VENOUS BLD VENIPUNCTURE: CPT | Performed by: FAMILY MEDICINE

## 2021-05-11 RX ORDER — LEVOTHYROXINE SODIUM 25 MCG
25 TABLET ORAL DAILY
Qty: 90 TABLET | Refills: 1 | Status: SHIPPED
Start: 2021-05-11 | End: 2021-11-01 | Stop reason: SDUPTHER

## 2021-05-11 RX ORDER — SERTRALINE HYDROCHLORIDE 25 MG/1
25 TABLET, FILM COATED ORAL DAILY
Qty: 90 TABLET | Refills: 1 | Status: SHIPPED
Start: 2021-05-11 | End: 2021-11-01 | Stop reason: SDUPTHER

## 2021-05-11 RX ORDER — TRIAMTERENE AND HYDROCHLOROTHIAZIDE 37.5; 25 MG/1; MG/1
1 TABLET ORAL DAILY
Qty: 90 TABLET | Refills: 1 | Status: SHIPPED
Start: 2021-05-11 | End: 2021-11-15 | Stop reason: SDUPTHER

## 2021-05-12 LAB — T4 FREE: 1.28 NG/DL (ref 0.93–1.7)

## 2021-05-17 ENCOUNTER — TELEPHONE (OUTPATIENT)
Dept: FAMILY MEDICINE CLINIC | Age: 71
End: 2021-05-17

## 2021-05-17 NOTE — TELEPHONE ENCOUNTER
Per patient her sister called her and she wanted to know what Purnima's lipid/protein rate was. I informed Veronica Meier I am not sure what that is so I gave her verbal results of her lipid panel done on 05- for her to report to her sister.

## 2021-05-24 ENCOUNTER — OFFICE VISIT (OUTPATIENT)
Dept: FAMILY MEDICINE CLINIC | Age: 71
End: 2021-05-24
Payer: MEDICARE

## 2021-05-24 VITALS
OXYGEN SATURATION: 98 % | DIASTOLIC BLOOD PRESSURE: 80 MMHG | HEIGHT: 64 IN | WEIGHT: 168.4 LBS | TEMPERATURE: 97.3 F | SYSTOLIC BLOOD PRESSURE: 118 MMHG | HEART RATE: 79 BPM | RESPIRATION RATE: 18 BRPM | BODY MASS INDEX: 28.75 KG/M2

## 2021-05-24 DIAGNOSIS — I10 ESSENTIAL HYPERTENSION, MALIGNANT: ICD-10-CM

## 2021-05-24 DIAGNOSIS — I10 ESSENTIAL HYPERTENSION: ICD-10-CM

## 2021-05-24 DIAGNOSIS — K21.9 GASTROESOPHAGEAL REFLUX DISEASE WITHOUT ESOPHAGITIS: ICD-10-CM

## 2021-05-24 DIAGNOSIS — I62.00 SUBDURAL HEMORRHAGE (HCC): ICD-10-CM

## 2021-05-24 DIAGNOSIS — R53.82 CHRONIC FATIGUE: ICD-10-CM

## 2021-05-24 DIAGNOSIS — K58.9 IRRITABLE BOWEL SYNDROME WITHOUT DIARRHEA: ICD-10-CM

## 2021-05-24 DIAGNOSIS — E78.5 HYPERLIPIDEMIA, UNSPECIFIED HYPERLIPIDEMIA TYPE: ICD-10-CM

## 2021-05-24 DIAGNOSIS — F41.9 ANXIETY: Primary | ICD-10-CM

## 2021-05-24 PROCEDURE — 99213 OFFICE O/P EST LOW 20 MIN: CPT | Performed by: FAMILY MEDICINE

## 2021-05-24 RX ORDER — ALPRAZOLAM 1 MG/1
1 TABLET ORAL DAILY
Qty: 30 TABLET | Refills: 0 | Status: SHIPPED
Start: 2021-05-24 | End: 2021-08-27 | Stop reason: SDUPTHER

## 2021-05-24 SDOH — ECONOMIC STABILITY: FOOD INSECURITY: WITHIN THE PAST 12 MONTHS, THE FOOD YOU BOUGHT JUST DIDN'T LAST AND YOU DIDN'T HAVE MONEY TO GET MORE.: NEVER TRUE

## 2021-05-24 SDOH — ECONOMIC STABILITY: FOOD INSECURITY: WITHIN THE PAST 12 MONTHS, YOU WORRIED THAT YOUR FOOD WOULD RUN OUT BEFORE YOU GOT MONEY TO BUY MORE.: NEVER TRUE

## 2021-05-24 ASSESSMENT — ENCOUNTER SYMPTOMS
APNEA: 0
PHOTOPHOBIA: 0
CONSTIPATION: 0
WHEEZING: 0
ABDOMINAL DISTENTION: 0
CHEST TIGHTNESS: 0
VOMITING: 0
FACIAL SWELLING: 0
SINUS PAIN: 0
ABDOMINAL PAIN: 0
SORE THROAT: 0
BACK PAIN: 0
SHORTNESS OF BREATH: 0
RECTAL PAIN: 0
COUGH: 0
NAUSEA: 0
VOICE CHANGE: 0
DIARRHEA: 0
RHINORRHEA: 0

## 2021-05-24 ASSESSMENT — PATIENT HEALTH QUESTIONNAIRE - PHQ9
SUM OF ALL RESPONSES TO PHQ QUESTIONS 1-9: 0
SUM OF ALL RESPONSES TO PHQ9 QUESTIONS 1 & 2: 0
SUM OF ALL RESPONSES TO PHQ QUESTIONS 1-9: 0
SUM OF ALL RESPONSES TO PHQ QUESTIONS 1-9: 0
1. LITTLE INTEREST OR PLEASURE IN DOING THINGS: 0
2. FEELING DOWN, DEPRESSED OR HOPELESS: 0

## 2021-05-24 ASSESSMENT — SOCIAL DETERMINANTS OF HEALTH (SDOH): HOW HARD IS IT FOR YOU TO PAY FOR THE VERY BASICS LIKE FOOD, HOUSING, MEDICAL CARE, AND HEATING?: NOT HARD AT ALL

## 2021-05-24 NOTE — PROGRESS NOTES
21  Christiano Allyn : 1950 Sex: female  Age: 72 y.o. Chief Complaint   Patient presents with    6 Month Follow-Up    Discuss Labs     lab reiew     Medication Refill       Overall check up       Review of Systems   Constitutional: Negative for appetite change, chills, diaphoresis, fatigue, fever and unexpected weight change. HENT: Negative for congestion, dental problem, ear discharge, ear pain, facial swelling, hearing loss, mouth sores, rhinorrhea, sinus pain, sore throat, tinnitus and voice change. Eyes: Negative for photophobia and visual disturbance (no visual changes). Respiratory: Negative for apnea, cough, chest tightness, shortness of breath and wheezing. Cardiovascular: Negative for chest pain and palpitations. Gastrointestinal: Negative for abdominal distention, abdominal pain, constipation, diarrhea, nausea, rectal pain and vomiting. Endocrine: Negative for cold intolerance, heat intolerance and polyuria. Genitourinary: Negative for dysuria, frequency, hematuria and urgency. Musculoskeletal: Negative for arthralgias, back pain, gait problem, joint swelling, myalgias, neck pain and neck stiffness. Skin: Negative for rash and wound. Allergic/Immunologic: Negative for environmental allergies. Neurological: Negative for dizziness, tremors, seizures, syncope, facial asymmetry, speech difficulty, weakness, light-headedness, numbness and headaches. Hematological: Does not bruise/bleed easily. Psychiatric/Behavioral: Negative for agitation, hallucinations and suicidal ideas. The patient is not nervous/anxious. Physical Exam  Vitals reviewed. Constitutional:       General: She is not in acute distress. Appearance: She is not ill-appearing. HENT:      Head: Normocephalic. Right Ear: Tympanic membrane, ear canal and external ear normal. There is no impacted cerumen.       Left Ear: Tympanic membrane, ear canal and external ear normal. There is no impacted cerumen. Nose: No rhinorrhea. Mouth/Throat:      Pharynx: No posterior oropharyngeal erythema. Eyes:      General:         Right eye: No discharge. Left eye: No discharge. Extraocular Movements: Extraocular movements intact. Pupils: Pupils are equal, round, and reactive to light. Neck:      Vascular: No carotid bruit. Cardiovascular:      Rate and Rhythm: Regular rhythm. Heart sounds: Normal heart sounds. No murmur heard. No gallop. Pulmonary:      Breath sounds: Normal breath sounds. No wheezing, rhonchi or rales. Abdominal:      Palpations: Abdomen is soft. There is no mass. Tenderness: There is no abdominal tenderness. There is no guarding or rebound. Hernia: No hernia is present. Musculoskeletal:         General: Normal range of motion. Cervical back: Neck supple. Lymphadenopathy:      Cervical: No cervical adenopathy. Skin:     General: Skin is warm and dry. Findings: No bruising or rash. Neurological:      General: No focal deficit present. Mental Status: She is alert and oriented to person, place, and time. Cranial Nerves: No cranial nerve deficit. Motor: No weakness. Coordination: Coordination normal.      Gait: Gait normal.   Psychiatric:         Mood and Affect: Mood normal.         Behavior: Behavior normal.         Assessment and Plan:  Chiara Lyons was seen today for 6 month follow-up, discuss labs and medication refill. Diagnoses and all orders for this visit:    Anxiety  -     ALPRAZolam (XANAX) 1 MG tablet; Take 1 tablet by mouth daily for 30 days.     Essential hypertension, malignant    Hyperlipidemia, unspecified hyperlipidemia type    Essential hypertension    Subdural hemorrhage (HCC)    Gastroesophageal reflux disease without esophagitis    Irritable bowel syndrome without diarrhea    Chronic fatigue          Discussions/Education provided to patients during visit:  [] Discussed the importance to stop smoking. [] Advised to monitor eating habits. [] Reviewed and discussed Imaging results. [] Reviewed and discussed Lab results. [x] Discussed the importance of drinking plenty of fluids. [x] Cut down on Salt, Caffeine, and Sugar. [x] Continue Medications as Discussed. [x] Communicated with patient any concerns, to phone office. Recheck 3 months   Reviewed meds continue meds       No follow-ups on file.       Seen By:  Jaylene Mckinley DO

## 2021-07-19 ENCOUNTER — OFFICE VISIT (OUTPATIENT)
Dept: ORTHOPEDIC SURGERY | Age: 71
End: 2021-07-19
Payer: MEDICARE

## 2021-07-19 VITALS — WEIGHT: 168 LBS | BODY MASS INDEX: 28.68 KG/M2 | HEIGHT: 64 IN

## 2021-07-19 DIAGNOSIS — M25.511 ACUTE PAIN OF RIGHT SHOULDER: ICD-10-CM

## 2021-07-19 DIAGNOSIS — S43.004D DISLOCATION OF RIGHT SHOULDER JOINT, SUBSEQUENT ENCOUNTER: ICD-10-CM

## 2021-07-19 DIAGNOSIS — Z98.890 S/P ARTHROSCOPY OF RIGHT SHOULDER: Primary | ICD-10-CM

## 2021-07-19 PROCEDURE — 20610 DRAIN/INJ JOINT/BURSA W/O US: CPT | Performed by: ORTHOPAEDIC SURGERY

## 2021-07-19 RX ORDER — TRIAMCINOLONE ACETONIDE 40 MG/ML
40 INJECTION, SUSPENSION INTRA-ARTICULAR; INTRAMUSCULAR ONCE
Status: COMPLETED | OUTPATIENT
Start: 2021-07-19 | End: 2021-07-19

## 2021-07-19 RX ORDER — TRIAMCINOLONE ACETONIDE 1 MG/G
CREAM TOPICAL
COMMUNITY
Start: 2021-06-24 | End: 2022-05-04

## 2021-07-19 RX ORDER — LIDOCAINE HYDROCHLORIDE 10 MG/ML
4 INJECTION, SOLUTION INFILTRATION; PERINEURAL ONCE
Status: COMPLETED | OUTPATIENT
Start: 2021-07-19 | End: 2021-07-19

## 2021-07-19 RX ORDER — PREDNISONE 10 MG/1
TABLET ORAL
COMMUNITY
Start: 2021-07-12 | End: 2022-05-04

## 2021-07-19 RX ADMIN — LIDOCAINE HYDROCHLORIDE 4 ML: 10 INJECTION, SOLUTION INFILTRATION; PERINEURAL at 14:10

## 2021-07-19 RX ADMIN — TRIAMCINOLONE ACETONIDE 40 MG: 40 INJECTION, SUSPENSION INTRA-ARTICULAR; INTRAMUSCULAR at 14:11

## 2021-07-19 NOTE — PROGRESS NOTES
Follow Up Visit     Wallace Galo returns today for follow up visit regarding Status post right shoulder rotator cuff repair 2 years ago. Treatment thus far has included home exercises,and Cortisone injection in right shoulder on 4/16/2021 performed last visit. She reports symptoms are minimally improved. Physical Exam:     No data recorded    General: Alert and oriented x3, no acute distress  Cardiovascular/pulmonary: No labored breathing, peripheral perfusion intact  Musculoskeletal:    Exam of the shoulder shows tenderness over the anterior shoulder. She has good range of motion and good strength with rotator cuff testing. Skin is intact. Controlled Substances Monitoring:      Imaging:  No new images. Previous MRI reviewed showing intact supraspinatus repair, thinning of subscapularis but apparent intact repair    Procedure Note:  Right Shoulder steroid injection     The Right shoulder was identified as the injection site. The risk and benefits of a cortisone injection were explained and the patient consented to the injection. Under sterile conditions, the anterior shoulder over the bicipital groove was injected  with a mixture of 40 mg of Kenalog, 4 cc  1% Lidocaine without complication. A sterile bandage was applied. Administrations This Visit     lidocaine 1 % injection 4 mL     Admin Date  07/19/2021 Action  Given Dose  4 mL Route  Intra-articular Administered By  Addi Ruano ATC          triamcinolone acetonide (KENALOG-40) injection 40 mg     Admin Date  07/19/2021 Action  Given Dose  40 mg Route  Intra-articular Administered By  Addi Ruano ATC                Assessment: 2 years out from right shoulder massive rotator cuff tear from which she initially did well, now with recent pain mainly over the anterior shoulder      Plan:   We discussed her shoulder today. She is mostly tender directly over the lesser tuberosity and bicipital groove.   We discussed trial of steroid injection

## 2021-08-27 ENCOUNTER — OFFICE VISIT (OUTPATIENT)
Dept: FAMILY MEDICINE CLINIC | Age: 71
End: 2021-08-27
Payer: MEDICARE

## 2021-08-27 VITALS
OXYGEN SATURATION: 98 % | TEMPERATURE: 97.7 F | SYSTOLIC BLOOD PRESSURE: 128 MMHG | WEIGHT: 171.8 LBS | HEART RATE: 64 BPM | BODY MASS INDEX: 29.33 KG/M2 | HEIGHT: 64 IN | DIASTOLIC BLOOD PRESSURE: 80 MMHG

## 2021-08-27 DIAGNOSIS — K21.9 GASTROESOPHAGEAL REFLUX DISEASE WITHOUT ESOPHAGITIS: ICD-10-CM
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
## 2021-08-27 DIAGNOSIS — S22.41XS CLOSED FRACTURE OF MULTIPLE RIBS OF RIGHT SIDE, SEQUELA: ICD-10-CM

## 2021-08-27 DIAGNOSIS — I10 ESSENTIAL HYPERTENSION: ICD-10-CM

## 2021-08-27 DIAGNOSIS — D69.2 PURPURA (HCC): Primary | ICD-10-CM

## 2021-08-27 DIAGNOSIS — F41.9 ANXIETY: ICD-10-CM

## 2021-08-27 DIAGNOSIS — I10 ESSENTIAL HYPERTENSION, MALIGNANT: ICD-10-CM

## 2021-08-27 PROCEDURE — 99213 OFFICE O/P EST LOW 20 MIN: CPT | Performed by: FAMILY MEDICINE

## 2021-08-27 PROCEDURE — 36415 COLL VENOUS BLD VENIPUNCTURE: CPT | Performed by: FAMILY MEDICINE

## 2021-08-27 RX ORDER — ALPRAZOLAM 1 MG/1
1 TABLET ORAL DAILY
Qty: 30 TABLET | Refills: 0 | Status: SHIPPED
Start: 2021-08-27 | End: 2021-11-29 | Stop reason: SDUPTHER

## 2021-08-27 RX ORDER — ROSUVASTATIN CALCIUM 10 MG/1
TABLET, COATED ORAL
Qty: 90 TABLET | Refills: 1 | Status: SHIPPED
Start: 2021-08-27 | End: 2022-03-14

## 2021-08-27 RX ORDER — PANTOPRAZOLE SODIUM 40 MG/1
40 TABLET, DELAYED RELEASE ORAL DAILY
Qty: 90 TABLET | Refills: 1 | Status: SHIPPED
Start: 2021-08-27 | End: 2022-03-14

## 2021-08-27 RX ORDER — AMLODIPINE BESYLATE 5 MG/1
5 TABLET ORAL DAILY
Qty: 90 TABLET | Refills: 1 | Status: SHIPPED
Start: 2021-08-27 | End: 2022-03-14
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
## 2021-08-29 ASSESSMENT — ENCOUNTER SYMPTOMS
DIARRHEA: 0
FACIAL SWELLING: 0
VOICE CHANGE: 0
RECTAL PAIN: 0
ABDOMINAL PAIN: 0
WHEEZING: 0
BACK PAIN: 0
RHINORRHEA: 0
SHORTNESS OF BREATH: 0
NAUSEA: 0
SORE THROAT: 0
APNEA: 0
VOMITING: 0
CONSTIPATION: 0
ABDOMINAL DISTENTION: 0
SINUS PAIN: 0
CHEST TIGHTNESS: 0
COUGH: 0
PHOTOPHOBIA: 0

## 2021-09-01 ENCOUNTER — OFFICE VISIT (OUTPATIENT)
Dept: ORTHOPEDIC SURGERY | Age: 71
End: 2021-09-01
Payer: MEDICARE

## 2021-09-01 VITALS — WEIGHT: 171 LBS | HEIGHT: 64 IN | BODY MASS INDEX: 29.19 KG/M2

## 2021-09-01 DIAGNOSIS — Z98.890 S/P ARTHROSCOPY OF RIGHT SHOULDER: Primary | ICD-10-CM

## 2021-09-01 PROCEDURE — 99213 OFFICE O/P EST LOW 20 MIN: CPT | Performed by: ORTHOPAEDIC SURGERY

## 2021-09-01 NOTE — PROGRESS NOTES
Follow Up Visit     Toi Angel returns today for follow up visit regarding status post right shoulder rotator cuff repair 2 years ago Treatment thus far has included surgery, steroid injections. She recently had an injection of the biceps tendon proximally on July 19. She had good relief from this. She has had some return of pain     REVIEW OF SYSTEMS:     Constitutional:  Negative for weight loss, fevers, chills, fatigue  Cardiovascular: Negative for chest pain, palpitations  Pulmonary: Negative for shortness of breath, labored breathing, cough  GI: negative for abdominal pain, nausea, vomitting   MSK: per HPI  Skin: negative for rash, open wounds    All other systems reviewed and are negative       Physical Exam:     No data recorded    General: Alert and oriented x3, no acute distress  Cardiovascular/pulmonary: No labored breathing, peripheral perfusion intact  Musculoskeletal:    Exam of the shoulder shows no reproducible pain today with full range of motion and good strength. She does have some mild tenderness at times with palpation of the anterior shoulder. Belly press test is intact but with mild limits of range of motion at extremes. Also some mild weakness with subscapularis testing    Controlled Substances Monitoring:      Imaging:  No new images. Previous MRIs reviewed showing intact repair, thinning of subscapularis      Assessment: History of massive right rotator cuff repair several years ago, with mild intermittent pain      Plan:   Overall currently she is doing okay. We will continue without any further treatment. We will see her back in 6 weeks. We discussed potential repeat injection at that time.     Abbey Head MD  Orthopaedic Surgery   9/1/21  11:11 AM

## 2021-09-29 ENCOUNTER — TELEPHONE (OUTPATIENT)
Dept: FAMILY MEDICINE CLINIC | Age: 71
End: 2021-09-29

## 2021-09-29 DIAGNOSIS — R23.3 ABNORMAL BRUISING: Primary | ICD-10-CM

## 2021-10-13 ENCOUNTER — OFFICE VISIT (OUTPATIENT)
Dept: ORTHOPEDIC SURGERY | Age: 71
End: 2021-10-13
Payer: MEDICARE

## 2021-10-13 VITALS — WEIGHT: 171 LBS | BODY MASS INDEX: 29.19 KG/M2 | HEIGHT: 64 IN

## 2021-10-13 DIAGNOSIS — S43.004D DISLOCATION OF RIGHT SHOULDER JOINT, SUBSEQUENT ENCOUNTER: ICD-10-CM

## 2021-10-13 DIAGNOSIS — Z98.890 S/P ARTHROSCOPY OF RIGHT SHOULDER: Primary | ICD-10-CM

## 2021-10-13 PROCEDURE — 99213 OFFICE O/P EST LOW 20 MIN: CPT | Performed by: ORTHOPAEDIC SURGERY

## 2021-11-01 RX ORDER — SERTRALINE HYDROCHLORIDE 25 MG/1
25 TABLET, FILM COATED ORAL DAILY
Qty: 90 TABLET | Refills: 1 | Status: SHIPPED
Start: 2021-11-01 | End: 2022-05-04

## 2021-11-01 RX ORDER — LEVOTHYROXINE SODIUM 25 MCG
25 TABLET ORAL DAILY
Qty: 90 TABLET | Refills: 1 | Status: SHIPPED
Start: 2021-11-01 | End: 2022-06-13 | Stop reason: SDUPTHER

## 2021-11-01 NOTE — TELEPHONE ENCOUNTER
Patients last appointment 8/27/2021.   Patients next scheduled appointment   Future Appointments   Date Time Provider Ruth Rose   11/5/2021 10:00 AM KINGS Sales - CNP E. PAL [facility]   11/29/2021 11:00 AM DO EFRAIN Mcgraw [facility]   1/12/2022 10:50 AM Khurram Cid MD BD ORTHO [facility]

## 2021-11-05 ENCOUNTER — OFFICE VISIT (OUTPATIENT)
Dept: FAMILY MEDICINE CLINIC | Age: 71
End: 2021-11-05
Payer: MEDICARE

## 2021-11-05 VITALS
HEART RATE: 82 BPM | SYSTOLIC BLOOD PRESSURE: 110 MMHG | BODY MASS INDEX: 29.41 KG/M2 | HEIGHT: 64 IN | TEMPERATURE: 97.9 F | RESPIRATION RATE: 20 BRPM | DIASTOLIC BLOOD PRESSURE: 80 MMHG | OXYGEN SATURATION: 98 % | WEIGHT: 172.3 LBS

## 2021-11-05 DIAGNOSIS — Z00.00 ROUTINE GENERAL MEDICAL EXAMINATION AT A HEALTH CARE FACILITY: Primary | ICD-10-CM

## 2021-11-05 PROCEDURE — G0439 PPPS, SUBSEQ VISIT: HCPCS | Performed by: NURSE PRACTITIONER

## 2021-11-05 ASSESSMENT — LIFESTYLE VARIABLES
HOW OFTEN DO YOU HAVE A DRINK CONTAINING ALCOHOL: TWO TO THREE TIMES A WEEK
HAS A RELATIVE, FRIEND, DOCTOR, OR ANOTHER HEALTH PROFESSIONAL EXPRESSED CONCERN ABOUT YOUR DRINKING OR SUGGESTED YOU CUT DOWN: 0
AUDIT-C TOTAL SCORE: 8
HOW OFTEN DURING THE LAST YEAR HAVE YOU FOUND THAT YOU WERE NOT ABLE TO STOP DRINKING ONCE YOU HAD STARTED: 3
HOW OFTEN DURING THE LAST YEAR HAVE YOU NEEDED AN ALCOHOLIC DRINK FIRST THING IN THE MORNING TO GET YOURSELF GOING AFTER A NIGHT OF HEAVY DRINKING: NEVER
HOW MANY STANDARD DRINKS CONTAINING ALCOHOL DO YOU HAVE ON A TYPICAL DAY: 2
HOW OFTEN DURING THE LAST YEAR HAVE YOU NEEDED AN ALCOHOLIC DRINK FIRST THING IN THE MORNING TO GET YOURSELF GOING AFTER A NIGHT OF HEAVY DRINKING: 0
HOW OFTEN DURING THE LAST YEAR HAVE YOU FAILED TO DO WHAT WAS NORMALLY EXPECTED FROM YOU BECAUSE OF DRINKING: NEVER
HOW OFTEN DURING THE LAST YEAR HAVE YOU FOUND THAT YOU WERE NOT ABLE TO STOP DRINKING ONCE YOU HAD STARTED: WEEKLY
HOW MANY STANDARD DRINKS CONTAINING ALCOHOL DO YOU HAVE ON A TYPICAL DAY: FIVE OR SIX
HAVE YOU OR SOMEONE ELSE BEEN INJURED AS A RESULT OF YOUR DRINKING: NO
HOW OFTEN DURING THE LAST YEAR HAVE YOU BEEN UNABLE TO REMEMBER WHAT HAPPENED THE NIGHT BEFORE BECAUSE YOU HAD BEEN DRINKING: 0
HOW OFTEN DURING THE LAST YEAR HAVE YOU HAD A FEELING OF GUILT OR REMORSE AFTER DRINKING: NEVER
HOW OFTEN DO YOU HAVE SIX OR MORE DRINKS ON ONE OCCASION: 3
AUDIT-C TOTAL SCORE: 0
HAVE YOU OR SOMEONE ELSE BEEN INJURED AS A RESULT OF YOUR DRINKING: 0
HAS A RELATIVE, FRIEND, DOCTOR, OR ANOTHER HEALTH PROFESSIONAL EXPRESSED CONCERN ABOUT YOUR DRINKING OR SUGGESTED YOU CUT DOWN: NO
AUDIT TOTAL SCORE: 11
HOW OFTEN DURING THE LAST YEAR HAVE YOU BEEN UNABLE TO REMEMBER WHAT HAPPENED THE NIGHT BEFORE BECAUSE YOU HAD BEEN DRINKING: NEVER
HOW OFTEN DURING THE LAST YEAR HAVE YOU HAD A FEELING OF GUILT OR REMORSE AFTER DRINKING: 0
HOW OFTEN DO YOU HAVE SIX OR MORE DRINKS ON ONE OCCASION: WEEKLY
HOW OFTEN DO YOU HAVE A DRINK CONTAINING ALCOHOL: 3
HOW OFTEN DURING THE LAST YEAR HAVE YOU FAILED TO DO WHAT WAS NORMALLY EXPECTED FROM YOU BECAUSE OF DRINKING: 0
AUDIT TOTAL SCORE: 0

## 2021-11-05 ASSESSMENT — PATIENT HEALTH QUESTIONNAIRE - PHQ9
SUM OF ALL RESPONSES TO PHQ QUESTIONS 1-9: 0
1. LITTLE INTEREST OR PLEASURE IN DOING THINGS: 0
1. LITTLE INTEREST OR PLEASURE IN DOING THINGS: 0
SUM OF ALL RESPONSES TO PHQ QUESTIONS 1-9: 0
2. FEELING DOWN, DEPRESSED OR HOPELESS: 0
2. FEELING DOWN, DEPRESSED OR HOPELESS: 0
SUM OF ALL RESPONSES TO PHQ QUESTIONS 1-9: 0
SUM OF ALL RESPONSES TO PHQ QUESTIONS 1-9: 0
SUM OF ALL RESPONSES TO PHQ9 QUESTIONS 1 & 2: 0
SUM OF ALL RESPONSES TO PHQ9 QUESTIONS 1 & 2: 0

## 2021-11-05 NOTE — PROGRESS NOTES
Products (ARNICA EX) Apply topically Yes Historical Provider, MD   Homeopathic Products (ARNICARE ARTHRITIS PO) Take by mouth Yes Historical Provider, MD   Zinc 100 MG TABS Take 1 tablet by mouth daily Yes Historical Provider, MD   phenazopyridine (PYRIDIUM) 100 MG tablet Take 1 tablet by mouth 3 times daily as needed for Pain Yes Lambert Combe, DO   Probiotic Product (PROBIOTIC DAILY PO) Take 1 tablet by mouth daily Yes Historical Provider, MD   butalbital-acetaminophen-caffeine (FIORICET, ESGIC) -40 MG per tablet Take 2 tablets by mouth every 4 hours as needed for Headaches Yes Byrd Lighter B Capal, DO   Estradiol-Norethindrone Acet (ACTIVELLA) 0.5-0.1 MG TABS Take 1 tablet by mouth daily Yes Historical Provider, MD   b complex vitamins capsule Take 1 capsule by mouth daily Ld 1/28/2019 Yes Historical Provider, MD   vitamin D3 (CHOLECALCIFEROL) 400 units TABS tablet Take 400 Units by mouth daily Ld 1/28/2019 Yes Historical Provider, MD   hyoscyamine (ANASPAZ;LEVSIN) 0.125 MG tablet Take 0.125 mg by mouth every 8 hours as needed for Cramping Yes Historical Provider, MD   multivitamin [facility]) per tablet Take 1 tablet by mouth daily Ld 1/28/2019 Yes Historical Provider, MD       Past Medical History:   Diagnosis Date    AC (acromioclavicular) arthritis     right shoulder    Anxiety     Diverticulitis     DJD (degenerative joint disease)     Esophagitis     GERD (gastroesophageal reflux disease)     Headache     Hiatal hernia     Hyperlipidemia     Hypertension     Hypothyroid     Hypothyroid     IBS (irritable bowel syndrome)     Impingement syndrome of right shoulder     right shoulder    PONV (postoperative nausea and vomiting)     Pre-operative clearance     medical and neuro for OR 1/31/2019.     SDH (subdural hematoma) (HCC)     h/o  follows with Dr Alexandr Kessler       Past Surgical History:   Procedure Laterality Date    ABDOMEN SURGERY      ABDOMINOPLASTY      BRAIN AVM REPAIR Left     BREAST REDUCTION SURGERY      BUNIONECTOMY Bilateral     COLONOSCOPY      COSMETIC SURGERY  2015    FACELIFT    FRACTURE SURGERY      OVARY REMOVAL Bilateral     RHINOPLASTY      SHOULDER ARTHROSCOPY Right 12/18/2015    SA decomp w/Michelle resect of dist clavicle/Inj for post-op pain HARISH Cotter MD    SHOULDER ARTHROSCOPY Right 1/31/2019    RIGHT SHOULDER ARTHROSCOPY ROTATOR CUFF REPAIR, SUBSCAPULAR REPAIR, BICEPS TENODESIS + ISB performed by Pa Moyer MD at [address] ENDOSCOPY      VASCULAR SURGERY         Family History   Problem Relation Age of Onset    Prostate Cancer Father     Breast Cancer Sister        CareTeam (Including outside providers/suppliers regularly involved in providing care):   Patient Care Team:  Mimi Rice DO as PCP - General (Family Medicine)  Mimi Rice DO as PCP - [facility] Mercedes Sher Empaneled Provider  DO Merissa Flower DO as Consulting Physician (Family Medicine)    Wt Readings from Last 3 Encounters:   11/05/21 172 lb 4.8 oz (78.2 kg)   10/13/21 171 lb (77.6 kg)   09/01/21 171 lb (77.6 kg)     Vitals:    11/05/21 1024   BP: 110/80   Pulse: 82   Resp: 20   Temp: 97.9 °F (36.6 °C)   TempSrc: Temporal   SpO2: 98%   Weight: 172 lb 4.8 oz (78.2 kg)   Height: 5' 4\" (1.626 m)     Body mass index is 29.58 kg/m². Based upon direct observation of the patient, evaluation of cognition reveals recent and remote memory intact.     General Appearance: alert and oriented to person, place and time, well developed and well- nourished, in no acute distress  Skin: warm and dry, no rash or erythema  Head: normocephalic and atraumatic  Eyes: pupils equal, round, and reactive to light, extraocular eye movements intact, conjunctivae normal  ENT: tympanic membrane, external ear and ear canal normal bilaterally, nose without deformity, nasal mucosa and turbinates normal without polyps  Neck: supple and non-tender without mass, no thyromegaly or thyroid nodules, no cervical lymphadenopathy  Pulmonary/Chest: clear to auscultation bilaterally- no wheezes, rales or rhonchi, normal air movement, no respiratory distress  Cardiovascular: normal rate, regular rhythm, normal S1 and S2, no murmurs, rubs, clicks, or gallops, distal pulses intact, no carotid bruits  Abdomen: soft, non-tender, non-distended, normal bowel sounds, no masses or organomegaly  Extremities: no cyanosis, clubbing or edema  Musculoskeletal: normal range of motion, no joint swelling, deformity or tenderness  Neurologic: reflexes normal and symmetric, no cranial nerve deficit, gait, coordination and speech normal    Patient's complete Health Risk Assessment and screening values have been reviewed and are found in Flowsheets. The following problems were reviewed today and where indicated follow up appointments were made and/or referrals ordered. Positive Risk Factor Screenings with Interventions:         Substance History:  Social History     Tobacco History     Smoking Status  Never Smoker    Smokeless Tobacco Use  Never Used          Alcohol History     Alcohol Use Status  Yes Comment  Occassional          Drug Use     Drug Use Status  No          Sexual Activity     Sexually Active  Yes Partners  Male               Alcohol Screening: Audit-C Score: 8  Total Score: 11    A score of 8 or more is associated with harmful or hazardous drinking. A score of 13 or more in women, and 15 or more in men, is likely to indicate alcohol dependence. Substance Abuse Interventions:  · none    General Health and ACP:  General  In general, how would you say your health is?: Very Good  In the past 7 days, have you experienced any of the following?  New or Increased Pain, New or Increased Fatigue, Loneliness, Social Isolation, Stress or Anger?: (!) New or Increased Pain  Do you get the social and emotional support that you need?: Yes  Do you have a Living Will?: (!) No  Advance Directives Power of [employer] Living Will ACP-Advance Directive ACP-Power of     Not on [place] on 12/25/12 Filed Not on File      General Health Risk Interventions:  · No Living Will: Patient declines ACP discussion/assistance        Personalized Preventive Plan   Current Health Maintenance Status  Immunization History   Administered Date(s) Administered    COVID-19, Adorno Neal, PF, 30mcg/0.3mL 03/04/2021, 03/30/2021    PPD Test 09/06/2018    Tdap (Boostrix, Adacel) 05/07/2020        Health Maintenance   Topic Date Due    Hepatitis C screen  Never done    Shingles Vaccine (1 of 2) Never done    Pneumococcal 65+ years Vaccine (1 of 1 - PPSV23) Never done    Breast cancer screen  08/26/2020    Flu vaccine (1) Never done    COVID-19 Vaccine (3 - Kyree De Jesus booster) 09/30/2021    Annual Wellness Visit (AWV)  10/31/2021    Lipid screen  05/11/2022    Potassium monitoring  05/11/2022    Creatinine monitoring  05/11/2022    Colon cancer screen colonoscopy  03/06/2028    DTaP/Tdap/Td vaccine (2 - Td or Tdap) 05/07/2030    DEXA (modify frequency per FRAX score)  Completed    Hepatitis A vaccine  Aged Out    Hepatitis B vaccine  Aged Out    Hib vaccine  Aged Out    Meningococcal (ACWY) vaccine  Aged Out     Recommendations for [facility] Due: see orders and patient instructions/AVS.  . Recommended screening schedule for the next 5-10 years is provided to the patient in written form: see Patient Instructions/AVS.    Jay Virk was seen today for medicare awv. Diagnoses and all orders for this visit:    Routine general medical examination at a health care facility                   Cardiovascular Disease Risk Counseling: Assessed the patient's risk to develop cardiovascular disease and reviewed main risk factors.    Reviewed steps to reduce disease risk including:   · Quitting tobacco use, reducing amount smoked, or not starting the habit  · Making healthy food choices  · Being physically active and gradualy increasing activity levels   · Reduce weight and determine a healthy BMI goal  · Monitor blood pressure and treat if higher than 140/90 mmHg  · Maintain blood total cholesterol levels under 5 mmol/l or 190 mg/dl  · Maintain LDL cholesterol levels under 3.0 mmol/l or 115 mg/dl   · Control blood glucose levels  · Consider taking aspirin (75 mg daily), once blood pressure is controlled   Provided a follow up plan.   Time spent (minutes): 10

## 2021-11-05 NOTE — PATIENT INSTRUCTIONS
Personalized Preventive Plan for London Fountain - 11/5/2021  Medicare offers a range of preventive health benefits. Some of the tests and screenings are paid in full while other may be subject to a deductible, co-insurance, and/or copay. Some of these benefits include a comprehensive review of your medical history including lifestyle, illnesses that may run in your family, and various assessments and screenings as appropriate. After reviewing your medical record and screening and assessments performed today your provider may have ordered immunizations, labs, imaging, and/or referrals for you. A list of these orders (if applicable) as well as your Preventive Care list are included within your After Visit Summary for your review. Other Preventive Recommendations:    · A preventive eye exam performed by an eye specialist is recommended every 1-2 years to screen for glaucoma; cataracts, macular degeneration, and other eye disorders. · A preventive dental visit is recommended every 6 months. · Try to get at least 150 minutes of exercise per week or 10,000 steps per day on a pedometer . · Order or download the FREE \"Exercise & Physical Activity: Your Everyday Guide\" from [employer]. Call 8-645.884.6314 or search [employer] online. · You need 9118-3094 mg of calcium and 6757-0634 IU of vitamin D per day. It is possible to meet your calcium requirement with diet alone, but a vitamin D supplement is usually necessary to meet this goal.  · When exposed to the sun, use a sunscreen that protects against both UVA and UVB radiation with an SPF of 30 or greater. Reapply every 2 to 3 hours or after sweating, drying off with a towel, or swimming. · Always wear a seat belt when traveling in a car. Always wear a helmet when riding a bicycle or motorcycle.

## 2021-11-15 RX ORDER — TRIAMTERENE AND HYDROCHLOROTHIAZIDE 37.5; 25 MG/1; MG/1
1 TABLET ORAL DAILY
Qty: 90 TABLET | Refills: 1 | Status: SHIPPED
Start: 2021-11-15 | End: 2022-06-03 | Stop reason: SDUPTHER

## 2021-11-15 NOTE — TELEPHONE ENCOUNTER
Patients last appointment 8/27/2021.   Patients next scheduled appointment   Future Appointments   Date Time Provider Ruth Rose   11/29/2021 11:00 AM DO EFRAIN Parker [facility]   1/12/2022 10:50 AM Flash Islas MD [facility] ORTH[facility]   11/7/2022  9:30 AM Amy Berenice Oppenheim, APRN - CNP [facility]

## 2021-11-29 ENCOUNTER — OFFICE VISIT (OUTPATIENT)
Dept: FAMILY MEDICINE CLINIC | Age: 71
End: 2021-11-29
Payer: MEDICARE

## 2021-11-29 VITALS
WEIGHT: 171.3 LBS | BODY MASS INDEX: 29.24 KG/M2 | DIASTOLIC BLOOD PRESSURE: 80 MMHG | SYSTOLIC BLOOD PRESSURE: 126 MMHG | HEIGHT: 64 IN | HEART RATE: 70 BPM | TEMPERATURE: 97.8 F | OXYGEN SATURATION: 98 %

## 2021-11-29 DIAGNOSIS — R23.3 ABNORMAL BRUISING: Primary | ICD-10-CM

## 2021-11-29 DIAGNOSIS — E78.5 HYPERLIPIDEMIA, UNSPECIFIED HYPERLIPIDEMIA TYPE: ICD-10-CM

## 2021-11-29 DIAGNOSIS — E03.9 HYPOTHYROIDISM, UNSPECIFIED TYPE: ICD-10-CM

## 2021-11-29 DIAGNOSIS — F41.9 ANXIETY: ICD-10-CM

## 2021-11-29 DIAGNOSIS — R53.82 CHRONIC FATIGUE: ICD-10-CM

## 2021-11-29 DIAGNOSIS — I10 ESSENTIAL HYPERTENSION, MALIGNANT: ICD-10-CM

## 2021-11-29 PROCEDURE — 99213 OFFICE O/P EST LOW 20 MIN: CPT | Performed by: FAMILY MEDICINE

## 2021-11-29 RX ORDER — ALPRAZOLAM 1 MG/1
1 TABLET ORAL DAILY
Qty: 30 TABLET | Refills: 0 | Status: SHIPPED
Start: 2021-11-29 | End: 2022-01-31 | Stop reason: SDUPTHER

## 2021-11-29 ASSESSMENT — ENCOUNTER SYMPTOMS
RHINORRHEA: 1
RECTAL PAIN: 0
NAUSEA: 0
CHEST TIGHTNESS: 0
COUGH: 0
FACIAL SWELLING: 0
PHOTOPHOBIA: 0
DIARRHEA: 0
SORE THROAT: 0
BACK PAIN: 0
SHORTNESS OF BREATH: 0
VOICE CHANGE: 0
CONSTIPATION: 0
VOMITING: 0
WHEEZING: 0
ABDOMINAL DISTENTION: 0
ABDOMINAL PAIN: 0
APNEA: 0
SINUS PAIN: 0

## 2021-11-29 NOTE — PROGRESS NOTES
21  López Ham : 1950 Sex: female  Age: 80 y.o. Chief Complaint   Patient presents with    Hypothyroidism     Pt is wondering if her meds need adjusted, she states she is always cold and tired        Overall check up   Cold,  tired all the time       Review of Systems   Constitutional: Negative for appetite change, chills, diaphoresis, fatigue, fever and unexpected weight change. HENT: Positive for postnasal drip and rhinorrhea. Negative for congestion, dental problem, ear discharge, ear pain, facial swelling, hearing loss, mouth sores, sinus pain, sore throat, tinnitus and voice change. Eyes: Negative for photophobia and visual disturbance (no visual changes). Respiratory: Negative for apnea, cough, chest tightness, shortness of breath and wheezing. Cardiovascular: Negative for chest pain and palpitations. Gastrointestinal: Negative for abdominal distention, abdominal pain, constipation, diarrhea, nausea, rectal pain and vomiting. Endocrine: Negative for cold intolerance, heat intolerance and polyuria. Genitourinary: Negative for dysuria, frequency, hematuria and urgency. Musculoskeletal: Negative for arthralgias, back pain, gait problem, joint swelling, myalgias, neck pain and neck stiffness. Skin: Negative for rash and wound. Allergic/Immunologic: Negative for environmental allergies. Neurological: Negative for dizziness, tremors, seizures, syncope, facial asymmetry, speech difficulty, weakness, light-headedness, numbness and headaches. Hematological: Bruises/bleeds easily. Psychiatric/Behavioral: Negative for agitation, hallucinations and suicidal ideas. The patient is not nervous/anxious. Physical Exam  Vitals reviewed. Constitutional:       General: She is not in acute distress. Appearance: She is not ill-appearing. Comments: Healthy appearing white female    HENT:      Head: Normocephalic.       Right Ear: Tympanic membrane, ear canal and external ear normal. There is no impacted cerumen. Left Ear: Tympanic membrane, ear canal and external ear normal. There is no impacted cerumen. Nose: No rhinorrhea. Mouth/Throat:      Pharynx: No posterior oropharyngeal erythema. Eyes:      General:         Right eye: No discharge. Left eye: No discharge. Extraocular Movements: Extraocular movements intact. Pupils: Pupils are equal, round, and reactive to light. Neck:      Vascular: No carotid bruit. Cardiovascular:      Rate and Rhythm: Rhythm irregular. Heart sounds: Normal heart sounds. No murmur heard. No gallop. Pulmonary:      Breath sounds: Normal breath sounds. No wheezing, rhonchi or rales. Abdominal:      Palpations: Abdomen is soft. There is no mass. Tenderness: There is no abdominal tenderness. There is no guarding or rebound. Hernia: No hernia is present. Musculoskeletal:         General: Normal range of motion. Cervical back: Neck supple. Comments: SPASM LEFT SHOULDER AND NECK IMPROVED    Lymphadenopathy:      Cervical: No cervical adenopathy. Skin:     General: Skin is warm and dry. Findings: No bruising or rash. Comments: Some bruising on the arms but she has no bruising anywhere else on the body according to her and we check certain areas and there does not seem to be any bruising at all   Neurological:      General: No focal deficit present. Mental Status: She is alert and oriented to person, place, and time. Cranial Nerves: No cranial nerve deficit. Motor: No weakness. Coordination: Coordination normal.      Gait: Gait normal.   Psychiatric:         Mood and Affect: Mood normal.         Behavior: Behavior normal.         Assessment and Plan:  Marivel Knott was seen today for hypothyroidism.     Diagnoses and all orders for this visit:    Abnormal bruising  -     CBC Auto Differential; Future  -     T4, Free; Future  -     TSH without Reflex; Future  -     Sedimentation Rate; Future  -     Lipid Panel; Future  -     Comprehensive Metabolic Panel; Future  -     Iron; Future  -     Vitamin D 25 Hydroxy; Future    Anxiety  -     ALPRAZolam (XANAX) 1 MG tablet; Take 1 tablet by mouth daily for 30 days. -     CBC Auto Differential; Future  -     T4, Free; Future  -     TSH without Reflex; Future  -     Sedimentation Rate; Future  -     Lipid Panel; Future  -     Comprehensive Metabolic Panel; Future  -     Iron; Future  -     Vitamin D 25 Hydroxy; Future    Essential hypertension, malignant  -     CBC Auto Differential; Future  -     T4, Free; Future  -     TSH without Reflex; Future  -     Sedimentation Rate; Future  -     Lipid Panel; Future  -     Comprehensive Metabolic Panel; Future  -     Iron; Future  -     Vitamin D 25 Hydroxy; Future    Hyperlipidemia, unspecified hyperlipidemia type  -     CBC Auto Differential; Future  -     T4, Free; Future  -     TSH without Reflex; Future  -     Sedimentation Rate; Future  -     Lipid Panel; Future  -     Comprehensive Metabolic Panel; Future  -     Iron; Future  -     Vitamin D 25 Hydroxy; Future    Chronic fatigue  -     CBC Auto Differential; Future  -     T4, Free; Future  -     TSH without Reflex; Future  -     Sedimentation Rate; Future  -     Lipid Panel; Future  -     Comprehensive Metabolic Panel; Future  -     Iron; Future  -     Vitamin D 25 Hydroxy; Future    Hypothyroidism, unspecified type  -     CBC Auto Differential; Future  -     T4, Free; Future  -     TSH without Reflex; Future  -     Sedimentation Rate; Future  -     Lipid Panel; Future  -     Comprehensive Metabolic Panel; Future  -     Iron; Future  -     Vitamin D 25 Hydroxy; Future          Discussions/Education provided to patients during visit:  [] Discussed the importance to stop smoking. [] Advised to monitor eating habits. [] Reviewed and discussed Imaging results. [] Reviewed and discussed Lab results.   [] Discussed the importance of drinking plenty of fluids. [] Cut down on Salt, Caffeine, and Sugar. [x] Continue Medications as Discussed. [x] Communicated with patient any concerns, to phone office  3 MONTHS BLOOD WORK AND VISIT . No follow-ups on file.       Seen by:     Ben Chowdhury DO

## 2021-12-17 ENCOUNTER — NURSE ONLY (OUTPATIENT)
Dept: FAMILY MEDICINE CLINIC | Age: 71
End: 2021-12-17
Payer: MEDICARE

## 2021-12-17 DIAGNOSIS — R23.3 ABNORMAL BRUISING: ICD-10-CM

## 2021-12-17 DIAGNOSIS — F41.9 ANXIETY: ICD-10-CM

## 2021-12-17 DIAGNOSIS — R53.82 CHRONIC FATIGUE: ICD-10-CM

## 2021-12-17 DIAGNOSIS — E03.9 HYPOTHYROIDISM, UNSPECIFIED TYPE: ICD-10-CM

## 2021-12-17 DIAGNOSIS — E78.2 MIXED HYPERLIPIDEMIA: ICD-10-CM

## 2021-12-17 DIAGNOSIS — I10 ESSENTIAL HYPERTENSION, MALIGNANT: ICD-10-CM

## 2021-12-17 DIAGNOSIS — E78.5 HYPERLIPIDEMIA, UNSPECIFIED HYPERLIPIDEMIA TYPE: ICD-10-CM

## 2021-12-17 LAB
ALBUMIN SERPL-MCNC: 4.4 G/DL (ref 3.5–5.2)
ALP BLD-CCNC: 87 U/L (ref 35–104)
ALT SERPL-CCNC: 31 U/L (ref 0–32)
ANION GAP SERPL CALCULATED.3IONS-SCNC: 14 MMOL/L (ref 7–16)
AST SERPL-CCNC: 30 U/L (ref 0–31)
BASOPHILS ABSOLUTE: 0.06 E9/L (ref 0–0.2)
BASOPHILS RELATIVE PERCENT: 1.1 % (ref 0–2)
BILIRUB SERPL-MCNC: 0.5 MG/DL (ref 0–1.2)
BUN BLDV-MCNC: 15 MG/DL (ref 6–23)
CALCIUM SERPL-MCNC: 9.6 MG/DL (ref 8.6–10.2)
CHLORIDE BLD-SCNC: 104 MMOL/L (ref 98–107)
CHOLESTEROL, TOTAL: 149 MG/DL (ref 0–199)
CO2: 25 MMOL/L (ref 22–29)
CREAT SERPL-MCNC: 1 MG/DL (ref 0.5–1)
EOSINOPHILS ABSOLUTE: 0.08 E9/L (ref 0.05–0.5)
EOSINOPHILS RELATIVE PERCENT: 1.5 % (ref 0–6)
GFR AFRICAN AMERICAN: >60
GFR NON-AFRICAN AMERICAN: 55 ML/MIN/1.73
GLUCOSE BLD-MCNC: 86 MG/DL (ref 74–99)
HCT VFR BLD CALC: 45.4 % (ref 34–48)
HDLC SERPL-MCNC: 68 MG/DL
HEMOGLOBIN: 14.8 G/DL (ref 11.5–15.5)
IMMATURE GRANULOCYTES #: 0.01 E9/L
IMMATURE GRANULOCYTES %: 0.2 % (ref 0–5)
IRON: 116 MCG/DL (ref 37–145)
LDL CHOLESTEROL CALCULATED: 69 MG/DL (ref 0–99)
LYMPHOCYTES ABSOLUTE: 1.93 E9/L (ref 1.5–4)
LYMPHOCYTES RELATIVE PERCENT: 36.7 % (ref 20–42)
MCH RBC QN AUTO: 29.8 PG (ref 26–35)
MCHC RBC AUTO-ENTMCNC: 32.6 % (ref 32–34.5)
MCV RBC AUTO: 91.3 FL (ref 80–99.9)
MONOCYTES ABSOLUTE: 0.5 E9/L (ref 0.1–0.95)
MONOCYTES RELATIVE PERCENT: 9.5 % (ref 2–12)
NEUTROPHILS ABSOLUTE: 2.68 E9/L (ref 1.8–7.3)
NEUTROPHILS RELATIVE PERCENT: 51 % (ref 43–80)
PDW BLD-RTO: 13.7 FL (ref 11.5–15)
PLATELET # BLD: 266 E9/L (ref 130–450)
PMV BLD AUTO: 9.9 FL (ref 7–12)
POTASSIUM SERPL-SCNC: 3.7 MMOL/L (ref 3.5–5)
RBC # BLD: 4.97 E12/L (ref 3.5–5.5)
SODIUM BLD-SCNC: 143 MMOL/L (ref 132–146)
T4 FREE: 1.28 NG/DL (ref 0.93–1.7)
TOTAL PROTEIN: 6.8 G/DL (ref 6.4–8.3)
TRIGL SERPL-MCNC: 60 MG/DL (ref 0–149)
TSH SERPL DL<=0.05 MIU/L-ACNC: 1.23 UIU/ML (ref 0.27–4.2)
VITAMIN D 25-HYDROXY: 47 NG/ML (ref 30–100)
VLDLC SERPL CALC-MCNC: 12 MG/DL
WBC # BLD: 5.3 E9/L (ref 4.5–11.5)

## 2021-12-17 PROCEDURE — 99999 PR OFFICE/OUTPT VISIT,PROCEDURE ONLY: CPT | Performed by: FAMILY MEDICINE

## 2021-12-17 PROCEDURE — 36415 COLL VENOUS BLD VENIPUNCTURE: CPT | Performed by: FAMILY MEDICINE

## 2021-12-18 LAB — SEDIMENTATION RATE, ERYTHROCYTE: 5 MM/HR (ref 0–20)

## 2021-12-20 ENCOUNTER — HOSPITAL ENCOUNTER (OUTPATIENT)
Age: 71
Discharge: HOME | End: 2021-12-20

## 2021-12-22 ENCOUNTER — HOSPITAL ENCOUNTER (OUTPATIENT)
Age: 71
Discharge: HOME | End: 2021-12-22
Payer: MEDICARE

## 2021-12-22 LAB
COLLAGEN ADENOSINE-5'-DIPHOSPHATE (ADP) TIME: 95 SEC (ref 48–103)
COLLAGEN EPINEPHRINE TIME: 116 SEC (ref 83–176)

## 2021-12-22 PROCEDURE — 85576 BLOOD PLATELET AGGREGATION: CPT

## 2021-12-22 PROCEDURE — 36415 COLL VENOUS BLD VENIPUNCTURE: CPT

## 2021-12-22 PROCEDURE — 84597 ASSAY OF VITAMIN K: CPT

## 2021-12-27 LAB — VITAMIN K1: 0.97 NMOL/L (ref 0.22–4.88)

## 2022-01-03 RX ORDER — ESTRADIOL AND NORETHINDRONE ACETATE .5; .1 MG/1; MG/1
1 TABLET ORAL DAILY
Qty: 90 TABLET | Refills: 1 | Status: SHIPPED
Start: 2022-01-03 | End: 2022-07-11 | Stop reason: SDUPTHER
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
## 2022-01-12 ENCOUNTER — OFFICE VISIT (OUTPATIENT)
Dept: ORTHOPEDIC SURGERY | Age: 72
End: 2022-01-12
Payer: MEDICARE

## 2022-01-12 VITALS — HEIGHT: 64 IN | BODY MASS INDEX: 29.19 KG/M2 | WEIGHT: 171 LBS

## 2022-01-12 DIAGNOSIS — M25.511 ACUTE PAIN OF RIGHT SHOULDER: Primary | ICD-10-CM

## 2022-01-12 DIAGNOSIS — S43.004D DISLOCATION OF RIGHT SHOULDER JOINT, SUBSEQUENT ENCOUNTER: ICD-10-CM

## 2022-01-12 DIAGNOSIS — Z98.890 S/P ARTHROSCOPY OF RIGHT SHOULDER: ICD-10-CM

## 2022-01-12 PROCEDURE — 99213 OFFICE O/P EST LOW 20 MIN: CPT | Performed by: ORTHOPAEDIC SURGERY

## 2022-01-12 NOTE — PROGRESS NOTES
Follow Up Visit     Cristóbal Moreno returns today for follow up visit regarding History of right shoulder massive rotator cuff repair from which she initially did well, now with progression of pain over the past several months. Treatment thus far has included home exercises,and Cortisone injection in right shoulder on 4/16/2021 + 7/19/2021 + 10/13/2021. She reports symptoms are improved. Most recent injection was over the biceps which she feels made a big difference    Physical Exam:     Height: 5' 4\" (1.626 m), Weight: 171 lb (77.6 kg) (per pt)    General: Alert and oriented x3, no acute distress  Cardiovascular/pulmonary: No labored breathing, peripheral perfusion intact  Musculoskeletal:    Hilario of the shoulder shows range of motion 160/45/T8. Good strength with Jud, Speed, [place]'s test.  Mild tenderness over the biceps tendon    Controlled Substances Monitoring:      Imaging:  No new images. Previous images reviewed      Assessment: History of right shoulder massive rotator cuff repair several years ago, with recent new onset of pain that is relieved with recent steroid injection over the biceps tendon      Plan:   We discussed her shoulder today. She is doing much better. She'll continue with normal activities. If her symptoms return or worsen she will see us back we will consider another injection.     Randi Mcdonald MD  Orthopaedic Surgery   1/12/22  11:02 AM

## 2022-01-14 LAB
Lab: NORMAL
REPORT: NORMAL
THIS TEST SENT TO: NORMAL

## 2022-01-31 ENCOUNTER — OFFICE VISIT (OUTPATIENT)
Dept: FAMILY MEDICINE CLINIC | Age: 72
End: 2022-01-31
Payer: MEDICARE

## 2022-01-31 VITALS
WEIGHT: 171.4 LBS | BODY MASS INDEX: 31.54 KG/M2 | TEMPERATURE: 97.3 F | OXYGEN SATURATION: 98 % | HEART RATE: 70 BPM | HEIGHT: 62 IN | RESPIRATION RATE: 18 BRPM | SYSTOLIC BLOOD PRESSURE: 136 MMHG | DIASTOLIC BLOOD PRESSURE: 78 MMHG

## 2022-01-31 DIAGNOSIS — R23.3 ABNORMAL BRUISING: ICD-10-CM

## 2022-01-31 DIAGNOSIS — I60.9 SAH (SUBARACHNOID HEMORRHAGE) (HCC): ICD-10-CM

## 2022-01-31 DIAGNOSIS — S06.5XAA SUBDURAL HEMATOMA: ICD-10-CM

## 2022-01-31 DIAGNOSIS — D69.2 PURPURA (HCC): ICD-10-CM

## 2022-01-31 DIAGNOSIS — E03.9 HYPOTHYROIDISM, UNSPECIFIED TYPE: Primary | ICD-10-CM

## 2022-01-31 DIAGNOSIS — I62.00 SUBDURAL HEMORRHAGE (HCC): ICD-10-CM
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
## 2022-01-31 DIAGNOSIS — S62.304D CLOSED NONDISPLACED FRACTURE OF FOURTH METACARPAL BONE OF RIGHT HAND WITH ROUTINE HEALING, UNSPECIFIED PORTION OF METACARPAL, SUBSEQUENT ENCOUNTER: ICD-10-CM

## 2022-01-31 DIAGNOSIS — E78.5 HYPERLIPIDEMIA, UNSPECIFIED HYPERLIPIDEMIA TYPE: ICD-10-CM

## 2022-01-31 DIAGNOSIS — I10 ESSENTIAL HYPERTENSION, MALIGNANT: ICD-10-CM

## 2022-01-31 DIAGNOSIS — E78.2 MIXED HYPERLIPIDEMIA: ICD-10-CM

## 2022-01-31 DIAGNOSIS — S43.004S DISLOCATION OF RIGHT SHOULDER JOINT, SEQUELA: ICD-10-CM

## 2022-01-31 DIAGNOSIS — F41.9 ANXIETY: ICD-10-CM

## 2022-01-31 DIAGNOSIS — K21.9 GASTROESOPHAGEAL REFLUX DISEASE WITHOUT ESOPHAGITIS: ICD-10-CM

## 2022-01-31 PROCEDURE — 99214 OFFICE O/P EST MOD 30 MIN: CPT | Performed by: FAMILY MEDICINE

## 2022-01-31 RX ORDER — ALPRAZOLAM 1 MG/1
1 TABLET ORAL DAILY
Qty: 30 TABLET | Refills: 1 | Status: SHIPPED | OUTPATIENT
Start: 2022-01-31 | End: 2022-03-02

## 2022-01-31 ASSESSMENT — ENCOUNTER SYMPTOMS
SORE THROAT: 0
RECTAL PAIN: 0
WHEEZING: 0
DIARRHEA: 0
CHEST TIGHTNESS: 0
SHORTNESS OF BREATH: 0
VOMITING: 0
APNEA: 0
COUGH: 0
SINUS PAIN: 0
FACIAL SWELLING: 0
PHOTOPHOBIA: 0
BACK PAIN: 0
VOICE CHANGE: 0
ABDOMINAL DISTENTION: 0
RHINORRHEA: 1
CONSTIPATION: 0
ABDOMINAL PAIN: 0
NAUSEA: 0

## 2022-01-31 ASSESSMENT — PATIENT HEALTH QUESTIONNAIRE - PHQ9
SUM OF ALL RESPONSES TO PHQ QUESTIONS 1-9: 0
2. FEELING DOWN, DEPRESSED OR HOPELESS: 0
SUM OF ALL RESPONSES TO PHQ9 QUESTIONS 1 & 2: 0
SUM OF ALL RESPONSES TO PHQ QUESTIONS 1-9: 0
1. LITTLE INTEREST OR PLEASURE IN DOING THINGS: 0
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
## 2022-03-14 RX ORDER — ROSUVASTATIN CALCIUM 10 MG/1
TABLET, COATED ORAL
Qty: 90 TABLET | Refills: 1 | Status: SHIPPED
Start: 2022-03-14 | End: 2022-08-29

## 2022-03-14 RX ORDER — PANTOPRAZOLE SODIUM 40 MG/1
TABLET, DELAYED RELEASE ORAL
Qty: 90 TABLET | Refills: 1 | Status: SHIPPED
Start: 2022-03-14 | End: 2022-08-29

## 2022-03-14 RX ORDER — AMLODIPINE BESYLATE 5 MG/1
TABLET ORAL
Qty: 90 TABLET | Refills: 0 | Status: SHIPPED
Start: 2022-03-14 | End: 2022-06-03 | Stop reason: SDUPTHER

## 2022-03-14 NOTE — TELEPHONE ENCOUNTER
Patients last appointment 1/31/2022. Patients next scheduled appointment   Future Appointments   Date Time Provider Ruth Rose   4/12/2022  8:00 AM SCHEDULE, RHONA EDWARD [facility]   4/18/2022 10:30 AM DO EFRAIN Merrill [facility]   11/7/2022  9:30 AM Charlette Tamez, APRN - CNP [facility]

## 2022-04-12 ENCOUNTER — NURSE ONLY (OUTPATIENT)
Dept: FAMILY MEDICINE CLINIC | Age: 72
End: 2022-04-12
Payer: MEDICARE

## 2022-04-12 DIAGNOSIS — I10 ESSENTIAL HYPERTENSION: ICD-10-CM

## 2022-04-12 DIAGNOSIS — E78.2 MIXED HYPERLIPIDEMIA: ICD-10-CM

## 2022-04-12 DIAGNOSIS — E03.9 HYPOTHYROIDISM, UNSPECIFIED TYPE: ICD-10-CM

## 2022-04-12 DIAGNOSIS — E78.2 MIXED HYPERLIPIDEMIA: Primary | ICD-10-CM

## 2022-04-12 LAB
BASOPHILS ABSOLUTE: 0.08 E9/L (ref 0–0.2)
BASOPHILS RELATIVE PERCENT: 1.6 % (ref 0–2)
EOSINOPHILS ABSOLUTE: 0.14 E9/L (ref 0.05–0.5)
EOSINOPHILS RELATIVE PERCENT: 2.8 % (ref 0–6)
HCT VFR BLD CALC: 47.6 % (ref 34–48)
HEMOGLOBIN: 14.9 G/DL (ref 11.5–15.5)
IMMATURE GRANULOCYTES #: 0.01 E9/L
IMMATURE GRANULOCYTES %: 0.2 % (ref 0–5)
LYMPHOCYTES ABSOLUTE: 2.1 E9/L (ref 1.5–4)
LYMPHOCYTES RELATIVE PERCENT: 41.4 % (ref 20–42)
MCH RBC QN AUTO: 29.3 PG (ref 26–35)
MCHC RBC AUTO-ENTMCNC: 31.3 % (ref 32–34.5)
MCV RBC AUTO: 93.7 FL (ref 80–99.9)
MONOCYTES ABSOLUTE: 0.56 E9/L (ref 0.1–0.95)
MONOCYTES RELATIVE PERCENT: 11 % (ref 2–12)
NEUTROPHILS ABSOLUTE: 2.18 E9/L (ref 1.8–7.3)
NEUTROPHILS RELATIVE PERCENT: 43 % (ref 43–80)
PDW BLD-RTO: 13.6 FL (ref 11.5–15)
PLATELET # BLD: 264 E9/L (ref 130–450)
PMV BLD AUTO: 9.8 FL (ref 7–12)
RBC # BLD: 5.08 E12/L (ref 3.5–5.5)
WBC # BLD: 5.1 E9/L (ref 4.5–11.5)

## 2022-04-12 PROCEDURE — 99999 PR OFFICE/OUTPT VISIT,PROCEDURE ONLY: CPT | Performed by: FAMILY MEDICINE

## 2022-04-12 PROCEDURE — 36415 COLL VENOUS BLD VENIPUNCTURE: CPT | Performed by: FAMILY MEDICINE

## 2022-04-13 LAB
ALBUMIN SERPL-MCNC: 4.6 G/DL (ref 3.5–5.2)
ALP BLD-CCNC: 82 U/L (ref 35–104)
ALT SERPL-CCNC: 18 U/L (ref 0–32)
ANION GAP SERPL CALCULATED.3IONS-SCNC: 15 MMOL/L (ref 7–16)
AST SERPL-CCNC: 27 U/L (ref 0–31)
BILIRUB SERPL-MCNC: 0.5 MG/DL (ref 0–1.2)
BUN BLDV-MCNC: 18 MG/DL (ref 6–23)
CALCIUM SERPL-MCNC: 10.2 MG/DL (ref 8.6–10.2)
CHLORIDE BLD-SCNC: 103 MMOL/L (ref 98–107)
CHOLESTEROL, TOTAL: 141 MG/DL (ref 0–199)
CO2: 25 MMOL/L (ref 22–29)
CREAT SERPL-MCNC: 1.3 MG/DL (ref 0.5–1)
GFR AFRICAN AMERICAN: 49
GFR NON-AFRICAN AMERICAN: 40 ML/MIN/1.73
GLUCOSE BLD-MCNC: 91 MG/DL (ref 74–99)
HDLC SERPL-MCNC: 71 MG/DL
LDL CHOLESTEROL CALCULATED: 57 MG/DL (ref 0–99)
POTASSIUM SERPL-SCNC: 4.1 MMOL/L (ref 3.5–5)
SODIUM BLD-SCNC: 143 MMOL/L (ref 132–146)
T4 FREE: 1.48 NG/DL (ref 0.93–1.7)
TOTAL PROTEIN: 7.4 G/DL (ref 6.4–8.3)
TRIGL SERPL-MCNC: 66 MG/DL (ref 0–149)
TSH SERPL DL<=0.05 MIU/L-ACNC: 3.05 UIU/ML (ref 0.27–4.2)
VLDLC SERPL CALC-MCNC: 13 MG/DL

## 2022-04-18 ENCOUNTER — OFFICE VISIT (OUTPATIENT)
Dept: FAMILY MEDICINE CLINIC | Age: 72
End: 2022-04-18
Payer: MEDICARE

## 2022-04-18 VITALS
HEART RATE: 65 BPM | OXYGEN SATURATION: 98 % | TEMPERATURE: 97.7 F | SYSTOLIC BLOOD PRESSURE: 136 MMHG | DIASTOLIC BLOOD PRESSURE: 88 MMHG | RESPIRATION RATE: 18 BRPM

## 2022-04-18 DIAGNOSIS — E78.5 HYPERLIPIDEMIA, UNSPECIFIED HYPERLIPIDEMIA TYPE: Primary | ICD-10-CM

## 2022-04-18 DIAGNOSIS — E03.9 HYPOTHYROIDISM, UNSPECIFIED TYPE: ICD-10-CM

## 2022-04-18 DIAGNOSIS — I60.9 SAH (SUBARACHNOID HEMORRHAGE) (HCC): ICD-10-CM

## 2022-04-18 DIAGNOSIS — R53.82 CHRONIC FATIGUE: ICD-10-CM

## 2022-04-18 DIAGNOSIS — E55.9 VITAMIN D DEFICIENCY: ICD-10-CM

## 2022-04-18 DIAGNOSIS — R73.01 IFG (IMPAIRED FASTING GLUCOSE): ICD-10-CM

## 2022-04-18 DIAGNOSIS — F41.9 ANXIETY: ICD-10-CM

## 2022-04-18 DIAGNOSIS — K58.9 IRRITABLE BOWEL SYNDROME WITHOUT DIARRHEA: ICD-10-CM

## 2022-04-18 DIAGNOSIS — M25.512 PAIN IN JOINT OF LEFT SHOULDER: ICD-10-CM

## 2022-04-18 DIAGNOSIS — I10 ESSENTIAL HYPERTENSION, MALIGNANT: ICD-10-CM

## 2022-04-18 PROCEDURE — 99213 OFFICE O/P EST LOW 20 MIN: CPT | Performed by: FAMILY MEDICINE

## 2022-04-18 RX ORDER — SERTRALINE HYDROCHLORIDE 25 MG/1
25 TABLET, FILM COATED ORAL DAILY
Qty: 30 TABLET | Refills: 5 | Status: SHIPPED
Start: 2022-04-18 | End: 2022-10-14 | Stop reason: SDUPTHER

## 2022-04-18 RX ORDER — FLUTICASONE PROPIONATE 50 MCG
2 SPRAY, SUSPENSION (ML) NASAL DAILY
Qty: 16 G | Refills: 5 | Status: SHIPPED | OUTPATIENT
Start: 2022-04-18

## 2022-04-18 RX ORDER — ALPRAZOLAM 1 MG/1
1 TABLET ORAL 2 TIMES DAILY PRN
Qty: 60 TABLET | Refills: 2 | Status: SHIPPED | OUTPATIENT
Start: 2022-04-18 | End: 2022-05-18

## 2022-04-18 ASSESSMENT — ENCOUNTER SYMPTOMS: SINUS PAIN: 1

## 2022-04-18 NOTE — PROGRESS NOTES
22  Ramno Anderson : 1950 Sex: female  Age: 80 y.o. Chief Complaint   Patient presents with    Discuss Labs     3 month follow up, \"blood spots\"    Medication Check       HPI   Still having eccymosis  Overall check up   Review of Systems   Constitutional: Negative for appetite change, chills, diaphoresis, fatigue, fever and unexpected weight change. HENT: Positive for postnasal drip, rhinorrhea and sinus pain. Negative for congestion, dental problem, ear discharge, ear pain, facial swelling, hearing loss, mouth sores, sore throat, tinnitus and voice change. Eyes: Negative for photophobia and visual disturbance (no visual changes). Respiratory: Negative for apnea, cough, chest tightness, shortness of breath and wheezing. Cardiovascular: Negative for chest pain and palpitations. Gastrointestinal: Negative for abdominal distention, abdominal pain, constipation, diarrhea, nausea, rectal pain and vomiting. Endocrine: Negative for cold intolerance, heat intolerance and polyuria. Genitourinary: Negative for dysuria, frequency, hematuria and urgency. Musculoskeletal: Positive for arthralgias. Negative for back pain, gait problem, joint swelling, myalgias, neck pain and neck stiffness. Skin: Positive for rash. Negative for wound. Allergic/Immunologic: Negative for environmental allergies. Neurological: Negative for dizziness, tremors, seizures, syncope, facial asymmetry, speech difficulty, weakness, light-headedness, numbness and headaches. Hematological: Bruises/bleeds easily. Psychiatric/Behavioral: Negative for agitation, hallucinations and suicidal ideas. The patient is not nervous/anxious. Physical Exam  Vitals reviewed. Constitutional:       General: She is not in acute distress. Appearance: She is not ill-appearing. HENT:      Head: Normocephalic. Right Ear: Tympanic membrane, ear canal and external ear normal. There is no impacted cerumen. Left Ear: Tympanic membrane, ear canal and external ear normal. There is no impacted cerumen. Nose: No rhinorrhea. Mouth/Throat:      Pharynx: No posterior oropharyngeal erythema. Comments: Left facial pain   Eyes:      General:         Right eye: No discharge. Left eye: No discharge. Extraocular Movements: Extraocular movements intact. Pupils: Pupils are equal, round, and reactive to light. Neck:      Vascular: No carotid bruit. Cardiovascular:      Rate and Rhythm: Regular rhythm. Heart sounds: Normal heart sounds. No murmur heard. No gallop. Pulmonary:      Breath sounds: Normal breath sounds. No wheezing, rhonchi or rales. Abdominal:      Palpations: Abdomen is soft. There is no mass. Tenderness: There is no abdominal tenderness. There is no guarding or rebound. Hernia: No hernia is present. Musculoskeletal:         General: Normal range of motion. Cervical back: Neck supple. Lymphadenopathy:      Cervical: No cervical adenopathy. Skin:     General: Skin is warm and dry. Findings: No bruising or rash. Neurological:      General: No focal deficit present. Mental Status: She is alert and oriented to person, place, and time. Cranial Nerves: No cranial nerve deficit. Motor: No weakness. Coordination: Coordination normal.      Gait: Gait normal.   Psychiatric:         Mood and Affect: Mood normal.         Behavior: Behavior normal.         Assessment and Plan:  Thomas Tate was seen today for discuss labs and medication check. Diagnoses and all orders for this visit:    Hyperlipidemia, unspecified hyperlipidemia type  -     CBC with Auto Differential; Future  -     Comprehensive Metabolic Panel; Future  -     Hemoglobin A1C; Future  -     Lipid Panel; Future  -     T4, Free; Future  -     TSH; Future  -     Vitamin D 25 Hydroxy; Future    Anxiety  -     ALPRAZolam (XANAX) 1 MG tablet;  Take 1 tablet by mouth 2 times daily as needed for Sleep or Anxiety for up to 30 days. -     CBC with Auto Differential; Future  -     Comprehensive Metabolic Panel; Future  -     Hemoglobin A1C; Future  -     Lipid Panel; Future  -     T4, Free; Future  -     TSH; Future  -     Vitamin D 25 Hydroxy; Future    Essential hypertension, malignant  -     CBC with Auto Differential; Future  -     Comprehensive Metabolic Panel; Future  -     Hemoglobin A1C; Future  -     Lipid Panel; Future  -     T4, Free; Future  -     TSH; Future  -     Vitamin D 25 Hydroxy; Future    Hypothyroidism, unspecified type  -     CBC with Auto Differential; Future  -     Comprehensive Metabolic Panel; Future  -     Hemoglobin A1C; Future  -     Lipid Panel; Future  -     T4, Free; Future  -     TSH; Future  -     Vitamin D 25 Hydroxy; Future    SAH (subarachnoid hemorrhage) (HCC)  -     CBC with Auto Differential; Future  -     Comprehensive Metabolic Panel; Future  -     Hemoglobin A1C; Future  -     Lipid Panel; Future  -     T4, Free; Future  -     TSH; Future  -     Vitamin D 25 Hydroxy; Future    Chronic fatigue  -     CBC with Auto Differential; Future  -     Comprehensive Metabolic Panel; Future  -     Hemoglobin A1C; Future  -     Lipid Panel; Future  -     T4, Free; Future  -     TSH; Future  -     Vitamin D 25 Hydroxy; Future    Irritable bowel syndrome without diarrhea  -     CBC with Auto Differential; Future  -     Comprehensive Metabolic Panel; Future  -     Hemoglobin A1C; Future  -     Lipid Panel; Future  -     T4, Free; Future  -     TSH; Future  -     Vitamin D 25 Hydroxy; Future    Pain in joint of left shoulder  -     CBC with Auto Differential; Future  -     Comprehensive Metabolic Panel; Future  -     Hemoglobin A1C; Future  -     Lipid Panel; Future  -     T4, Free; Future  -     TSH; Future  -     Vitamin D 25 Hydroxy; Future    Vitamin D deficiency  -     CBC with Auto Differential; Future  -     Comprehensive Metabolic Panel;  Future  -     Hemoglobin A1C; Future  -     Lipid Panel; Future  -     T4, Free; Future  -     TSH; Future  -     Vitamin D 25 Hydroxy; Future    IFG (impaired fasting glucose)    Other orders  -     sertraline (ZOLOFT) 25 MG tablet; Take 1 tablet by mouth daily  -     fluticasone (FLONASE) 50 MCG/ACT nasal spray; 2 sprays by Each Nostril route daily          Discussions/Education provided to patients during visit:  [] Discussed the importance to stop smoking. [] Advised to monitor eating habits. [] Reviewed and discussed Imaging results. [] Reviewed and discussed Lab results. [x] Discussed the importance of drinking plenty of fluids. [x] Cut down on Salt, Caffeine, and Sugar. [x] Continue Medications as Discussed. [x] Communicated with patient any concerns, to phone office  . scripts given   4  months and labs         No follow-ups on file.       Seen by:     Robyn Betancourt DO

## 2022-05-04 ASSESSMENT — ENCOUNTER SYMPTOMS
FACIAL SWELLING: 0
VOMITING: 0
WHEEZING: 0
PHOTOPHOBIA: 0
APNEA: 0
CHEST TIGHTNESS: 0
VOICE CHANGE: 0
SHORTNESS OF BREATH: 0
SORE THROAT: 0
NAUSEA: 0
CONSTIPATION: 0
RECTAL PAIN: 0
COUGH: 0
RHINORRHEA: 1
DIARRHEA: 0
ABDOMINAL PAIN: 0
ABDOMINAL DISTENTION: 0
BACK PAIN: 0

## 2022-06-03 RX ORDER — AMLODIPINE BESYLATE 5 MG/1
TABLET ORAL
Qty: 90 TABLET | Refills: 1 | Status: SHIPPED
Start: 2022-06-03 | End: 2022-06-03 | Stop reason: SDUPTHER

## 2022-06-03 RX ORDER — AMLODIPINE BESYLATE 5 MG/1
TABLET ORAL
Qty: 90 TABLET | Refills: 1 | Status: SHIPPED | OUTPATIENT
Start: 2022-06-03

## 2022-06-03 RX ORDER — TRIAMTERENE AND HYDROCHLOROTHIAZIDE 37.5; 25 MG/1; MG/1
1 TABLET ORAL DAILY
Qty: 90 TABLET | Refills: 1 | Status: SHIPPED | OUTPATIENT
Start: 2022-06-03

## 2022-06-13 ENCOUNTER — OFFICE VISIT (OUTPATIENT)
Dept: FAMILY MEDICINE CLINIC | Age: 72
End: 2022-06-13
Payer: MEDICARE

## 2022-06-13 VITALS
OXYGEN SATURATION: 97 % | BODY MASS INDEX: 34.1 KG/M2 | WEIGHT: 173.7 LBS | DIASTOLIC BLOOD PRESSURE: 78 MMHG | RESPIRATION RATE: 18 BRPM | TEMPERATURE: 97.7 F | HEIGHT: 60 IN | SYSTOLIC BLOOD PRESSURE: 122 MMHG | HEART RATE: 67 BPM

## 2022-06-13 DIAGNOSIS — J01.91 ACUTE RECURRENT SINUSITIS, UNSPECIFIED LOCATION: Primary | ICD-10-CM

## 2022-06-13 PROCEDURE — 99213 OFFICE O/P EST LOW 20 MIN: CPT | Performed by: NURSE PRACTITIONER

## 2022-06-13 PROCEDURE — 1123F ACP DISCUSS/DSCN MKR DOCD: CPT | Performed by: NURSE PRACTITIONER

## 2022-06-13 RX ORDER — LEVOTHYROXINE SODIUM 25 MCG
25 TABLET ORAL DAILY
Qty: 90 TABLET | Refills: 1 | Status: SHIPPED | OUTPATIENT
Start: 2022-06-13 | End: 2022-12-10

## 2022-06-13 RX ORDER — DOXYCYCLINE HYCLATE 100 MG
100 TABLET ORAL 2 TIMES DAILY
Qty: 20 TABLET | Refills: 0 | Status: SHIPPED | OUTPATIENT
Start: 2022-06-13 | End: 2022-06-23

## 2022-06-13 SDOH — ECONOMIC STABILITY: FOOD INSECURITY: WITHIN THE PAST 12 MONTHS, YOU WORRIED THAT YOUR FOOD WOULD RUN OUT BEFORE YOU GOT MONEY TO BUY MORE.: NEVER TRUE

## 2022-06-13 SDOH — ECONOMIC STABILITY: FOOD INSECURITY: WITHIN THE PAST 12 MONTHS, THE FOOD YOU BOUGHT JUST DIDN'T LAST AND YOU DIDN'T HAVE MONEY TO GET MORE.: NEVER TRUE

## 2022-06-13 ASSESSMENT — ENCOUNTER SYMPTOMS
CHEST TIGHTNESS: 0
RHINORRHEA: 1
SINUS PRESSURE: 1
ABDOMINAL PAIN: 0
EYE REDNESS: 0
RECTAL PAIN: 0
EYE PAIN: 0
FACIAL SWELLING: 0
BLOOD IN STOOL: 0
SINUS PAIN: 0
APNEA: 0
STRIDOR: 0
COLOR CHANGE: 0
WHEEZING: 0
VOMITING: 0
EYE DISCHARGE: 0
CHOKING: 0
COUGH: 1
ABDOMINAL DISTENTION: 0
ANAL BLEEDING: 0
TROUBLE SWALLOWING: 0
BACK PAIN: 0
VOICE CHANGE: 0
SORE THROAT: 0
EYE ITCHING: 0
CONSTIPATION: 0
SHORTNESS OF BREATH: 0
NAUSEA: 0
DIARRHEA: 0
PHOTOPHOBIA: 0

## 2022-06-13 ASSESSMENT — SOCIAL DETERMINANTS OF HEALTH (SDOH): HOW HARD IS IT FOR YOU TO PAY FOR THE VERY BASICS LIKE FOOD, HOUSING, MEDICAL CARE, AND HEATING?: NOT HARD AT ALL
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
## 2022-06-20 ENCOUNTER — OFFICE VISIT (OUTPATIENT)
Dept: FAMILY MEDICINE CLINIC | Age: 72
End: 2022-06-20
Payer: MEDICARE

## 2022-06-20 VITALS
WEIGHT: 173.1 LBS | HEART RATE: 65 BPM | TEMPERATURE: 97.1 F | BODY MASS INDEX: 32.68 KG/M2 | DIASTOLIC BLOOD PRESSURE: 82 MMHG | HEIGHT: 61 IN | SYSTOLIC BLOOD PRESSURE: 124 MMHG | OXYGEN SATURATION: 98 %

## 2022-06-20 DIAGNOSIS — I10 ESSENTIAL HYPERTENSION, MALIGNANT: ICD-10-CM

## 2022-06-20 DIAGNOSIS — E78.5 HYPERLIPIDEMIA, UNSPECIFIED HYPERLIPIDEMIA TYPE: ICD-10-CM

## 2022-06-20 DIAGNOSIS — I10 ESSENTIAL HYPERTENSION: ICD-10-CM

## 2022-06-20 DIAGNOSIS — K58.9 IRRITABLE BOWEL SYNDROME WITHOUT DIARRHEA: ICD-10-CM

## 2022-06-20 DIAGNOSIS — I62.00 SUBDURAL HEMORRHAGE (HCC): ICD-10-CM

## 2022-06-20 DIAGNOSIS — S06.5XAA SUBDURAL HEMATOMA: ICD-10-CM

## 2022-06-20 DIAGNOSIS — R73.01 IFG (IMPAIRED FASTING GLUCOSE): ICD-10-CM

## 2022-06-20 DIAGNOSIS — J01.91 ACUTE RECURRENT SINUSITIS, UNSPECIFIED LOCATION: Primary | ICD-10-CM

## 2022-06-20 DIAGNOSIS — K21.9 GASTROESOPHAGEAL REFLUX DISEASE WITHOUT ESOPHAGITIS: ICD-10-CM

## 2022-06-20 DIAGNOSIS — R53.82 CHRONIC FATIGUE: ICD-10-CM

## 2022-06-20 DIAGNOSIS — E03.9 HYPOTHYROIDISM, UNSPECIFIED TYPE: ICD-10-CM

## 2022-06-20 PROCEDURE — 99213 OFFICE O/P EST LOW 20 MIN: CPT | Performed by: FAMILY MEDICINE

## 2022-06-20 PROCEDURE — 1123F ACP DISCUSS/DSCN MKR DOCD: CPT | Performed by: FAMILY MEDICINE

## 2022-06-20 RX ORDER — AMOXICILLIN AND CLAVULANATE POTASSIUM 875; 125 MG/1; MG/1
1 TABLET, FILM COATED ORAL 2 TIMES DAILY
Qty: 20 TABLET | Refills: 0 | Status: SHIPPED | OUTPATIENT
Start: 2022-06-20 | End: 2022-06-30

## 2022-06-20 RX ORDER — METHYLPREDNISOLONE 4 MG/1
TABLET ORAL
Qty: 1 KIT | Refills: 0 | Status: SHIPPED | OUTPATIENT
Start: 2022-06-20 | End: 2022-06-26

## 2022-06-20 ASSESSMENT — ENCOUNTER SYMPTOMS
PHOTOPHOBIA: 0
COUGH: 1
APNEA: 0
ABDOMINAL DISTENTION: 0
BACK PAIN: 0
VOMITING: 0
SORE THROAT: 0
FACIAL SWELLING: 0
SINUS PRESSURE: 1
CHEST TIGHTNESS: 0
CONSTIPATION: 0
RECTAL PAIN: 0
ABDOMINAL PAIN: 0
SHORTNESS OF BREATH: 0
SINUS PAIN: 1
WHEEZING: 0
NAUSEA: 0
DIARRHEA: 0
RHINORRHEA: 0
VOICE CHANGE: 0

## 2022-06-20 NOTE — PROGRESS NOTES
22  [facility] : 1950 Sex: female  Age: 80 y.o. Chief Complaint   Patient presents with    Cough     wet and dry cough, ear pain but worse on left, lots of drainage, runny nose, head congestion, headache, fatigue, been a week now, she is on doxycycline and finishes that on Wednesday but feeling worse        HPI  As above    Cough ,cogestion ,ears fatgue     Review of Systems   Constitutional: Negative for appetite change, chills, diaphoresis, fatigue, fever and unexpected weight change. HENT: Positive for ear pain (left ), sinus pressure and sinus pain. Negative for congestion, dental problem, ear discharge, facial swelling, hearing loss, mouth sores, rhinorrhea, sore throat, tinnitus and voice change. Eyes: Negative for photophobia and visual disturbance (no visual changes). Respiratory: Positive for cough (productive ). Negative for apnea, chest tightness, shortness of breath and wheezing. Cardiovascular: Negative for chest pain and palpitations. Gastrointestinal: Negative for abdominal distention, abdominal pain, constipation, diarrhea, nausea, rectal pain and vomiting. Endocrine: Negative for cold intolerance, heat intolerance and polyuria. Genitourinary: Negative for dysuria, frequency, hematuria and urgency. Musculoskeletal: Positive for arthralgias. Negative for back pain, gait problem, joint swelling, myalgias, neck pain and neck stiffness. Skin: Negative for rash and wound. Allergic/Immunologic: Negative for environmental allergies. Neurological: Negative for dizziness, tremors, seizures, syncope, facial asymmetry, speech difficulty, weakness, light-headedness, numbness and headaches. Hematological: Does not bruise/bleed easily. Psychiatric/Behavioral: Negative for agitation, hallucinations and suicidal ideas. The patient is not nervous/anxious. Physical Exam  Vitals and nursing note reviewed.    Constitutional:       General: She is not in acute distress. Appearance: Normal appearance. She is normal weight. She is not ill-appearing, toxic-appearing or diaphoretic. HENT:      Head: Normocephalic and atraumatic. Right Ear: Ear canal and external ear normal. There is no impacted cerumen. Left Ear: Ear canal and external ear normal. There is no impacted cerumen. Ears:      Comments: bilater ear slight redded      Nose: Congestion and rhinorrhea present. Mouth/Throat:      Mouth: Mucous membranes are moist.      Pharynx: Oropharynx is clear. Posterior oropharyngeal erythema present. No oropharyngeal exudate. Eyes:      General: No scleral icterus. Right eye: No discharge. Left eye: No discharge. Extraocular Movements: Extraocular movements intact. Conjunctiva/sclera: Conjunctivae normal.      Pupils: Pupils are equal, round, and reactive to light. Neck:      Vascular: No carotid bruit. Cardiovascular:      Rate and Rhythm: Normal rate and regular rhythm. Pulses: Normal pulses. Heart sounds: Normal heart sounds. No murmur heard. No friction rub. No gallop. Pulmonary:      Effort: Pulmonary effort is normal. No respiratory distress. Breath sounds: No stridor. No wheezing, rhonchi or rales. Comments: Coughing   Chest:      Chest wall: No tenderness. Abdominal:      General: Abdomen is flat. Bowel sounds are normal. There is no distension. Palpations: Abdomen is soft. There is no mass. Tenderness: There is no abdominal tenderness. There is no right CVA tenderness, left CVA tenderness, guarding or rebound. Hernia: No hernia is present. Musculoskeletal:         General: No swelling, tenderness, deformity or signs of injury. Normal range of motion. Cervical back: Normal range of motion and neck supple. No rigidity. No muscular tenderness. Right lower leg: No edema. Left lower leg: No edema. Lymphadenopathy:      Cervical: No cervical adenopathy. Skin:     General: Skin is warm and dry. Capillary Refill: Capillary refill takes less than 2 seconds. Coloration: Skin is not jaundiced or pale. Findings: No bruising, erythema, lesion or rash. Neurological:      General: No focal deficit present. Mental Status: She is alert and oriented to person, place, and time. Mental status is at baseline. Cranial Nerves: No cranial nerve deficit. Sensory: No sensory deficit. Motor: No weakness. Coordination: Coordination normal.      Gait: Gait normal.      Deep Tendon Reflexes: Reflexes normal.   Psychiatric:         Mood and Affect: Mood normal.         Behavior: Behavior normal.         Thought Content: Thought content normal.         Judgment: Judgment normal.         Assessment and Plan:  Jackie Nunez was seen today for cough. Diagnoses and all orders for this visit:    Acute recurrent sinusitis, unspecified location    IFG (impaired fasting glucose)    Subdural hematoma (HCC)    Subdural hemorrhage (HCC)    Chronic fatigue    Irritable bowel syndrome without diarrhea    Hyperlipidemia, unspecified hyperlipidemia type    Essential hypertension, malignant    Hypothyroidism, unspecified type    Essential hypertension    Gastroesophageal reflux disease without esophagitis          Discussions/Education provided to patients during visit:  [] Discussed the importance to stop smoking. [] Advised to monitor eating habits. [] Reviewed and discussed Imaging results. [x] Reviewed and discussed Lab results. [x] Discussed the importance of drinking plenty of fluids. [x] Cut down on Salt, Caffeine, and Sugar.  [] Continue Medications as Discussed. [x] Communicated with patient any concerns, to phone office. augmentin 875 bid   Medrol dose pack   Recheck in 2 weeks         No follow-ups on file.       Seen by:     Antonio Carson DO

## 2022-07-05 ENCOUNTER — OFFICE VISIT (OUTPATIENT)
Dept: FAMILY MEDICINE CLINIC | Age: 72
End: 2022-07-05
Payer: MEDICARE

## 2022-07-05 ENCOUNTER — HOSPITAL ENCOUNTER (OUTPATIENT)
Age: 72
Discharge: HOME | End: 2022-07-07
Payer: MEDICARE

## 2022-07-05 ENCOUNTER — HOSPITAL ENCOUNTER (OUTPATIENT)
Dept: GENERAL RADIOLOGY | Age: 72
Discharge: HOME | End: 2022-07-07
Payer: MEDICARE

## 2022-07-05 VITALS
TEMPERATURE: 98.7 F | OXYGEN SATURATION: 98 % | SYSTOLIC BLOOD PRESSURE: 122 MMHG | DIASTOLIC BLOOD PRESSURE: 78 MMHG | HEART RATE: 61 BPM | RESPIRATION RATE: 17 BRPM

## 2022-07-05 DIAGNOSIS — J01.91 ACUTE RECURRENT SINUSITIS, UNSPECIFIED LOCATION: ICD-10-CM

## 2022-07-05 DIAGNOSIS — R05.3 CHRONIC COUGH: ICD-10-CM

## 2022-07-05 DIAGNOSIS — I10 ESSENTIAL HYPERTENSION: ICD-10-CM

## 2022-07-05 DIAGNOSIS — R05.3 CHRONIC COUGH: Primary | ICD-10-CM

## 2022-07-05 PROCEDURE — 71046 X-RAY EXAM CHEST 2 VIEWS: CPT

## 2022-07-05 PROCEDURE — 99213 OFFICE O/P EST LOW 20 MIN: CPT | Performed by: NURSE PRACTITIONER

## 2022-07-05 PROCEDURE — 1123F ACP DISCUSS/DSCN MKR DOCD: CPT | Performed by: NURSE PRACTITIONER

## 2022-07-05 RX ORDER — BENZONATATE 200 MG/1
200 CAPSULE ORAL 3 TIMES DAILY PRN
Qty: 30 CAPSULE | Refills: 0 | Status: SHIPPED
Start: 2022-07-05 | End: 2022-07-15 | Stop reason: SDUPTHER

## 2022-07-05 RX ORDER — AZITHROMYCIN 250 MG/1
250 TABLET, FILM COATED ORAL SEE ADMIN INSTRUCTIONS
Qty: 6 TABLET | Refills: 0 | Status: SHIPPED | OUTPATIENT
Start: 2022-07-05 | End: 2022-07-10

## 2022-07-05 NOTE — PROGRESS NOTES
Chief Complaint   Congestion (cough, headache, left side sore throat, not sleeping well, been going on for 3 weeks)    wet and dry cough, ear pain but worse on left, lots of drainage, runny nose, head congestion, headache, fatigue, been a week now, she is on doxycycline and finishes that on Wednesday but feeling worse   Patient complains of sinus tenderness worse under left eye, cough, nasal congestion, headache. No fever, chills, sweats, ear ache, sore throat. Does have post nasal drainage. No chest congestion, wheezing, or tightness. No GI symptoms. Does not want tested for COVID. History of Present Illness   Source of history provided by:  patient. Phyllis Franz is a 71 y.o. old female who presents to the flu clinic with complaints of Cough  ear pain, and congestion x 3 Weeks. States symptoms have stayed the same since onset. Has been taking none without symptomatic relief. Denies any Shortness of breath. Denies any hx of no history of pneumonia or bronchitis. ROS   Pertinent positives and negatives are stated within HPI, all other systems reviewed and are negative. Past Medical History:  has a past medical history of AC (acromioclavicular) arthritis, Anxiety, Diverticulitis, DJD (degenerative joint disease), Esophagitis, GERD (gastroesophageal reflux disease), Headache, Hiatal hernia, Hyperlipidemia, Hypertension, Hypothyroid, Hypothyroid, IBS (irritable bowel syndrome), Impingement syndrome of right shoulder, PONV (postoperative nausea and vomiting), Pre-operative clearance, and SDH (subdural hematoma) ([facility] [address] Past Surgical History:  has a past surgical history that includes Tonsillectomy; fracture surgery; Abdomen surgery; Shoulder arthroscopy (Right, 12/18/2015); Bunionectomy (Bilateral); vascular surgery; Ovary removal (Bilateral); Breast reduction surgery; Abdominoplasty; Brain AVM surgery (Left); rhinoplasty; Colonoscopy; Upper gastrointestinal endoscopy;  Shoulder arthroscopy (Right, 1/31/2019); and Cosmetic surgery (2015). Social History:  reports that she has never smoked. She has never used smokeless tobacco. She reports current alcohol use. She reports that she does not use drugs. Family History: family history includes Breast Cancer in her sister; Prostate Cancer in her father. Allergies: Albumin (human), Bee venom, Eggs or egg-derived products [eggs or egg-derived products], Norco [hydrocodone-acetaminophen], and Theophyllines    Physical Exam   Vital Signs:  /78 (Site: Left Upper Arm, Position: Sitting, Cuff Size: Large Adult)   Pulse 61   Temp 98.7 °F (37.1 °C) (Temporal)   Resp 17   SpO2 98%    Oxygen Saturation Interpretation: Normal.    Constitutional:  Alert, development consistent with age. NAD. Head:  NC/NT. Airway patent. Ears: TMs Clear bilaterally. Canals without exudate or swelling bilaterally. Mouth: Posterior pharynx with mild erythema and clear postnasal drip. no tonsillar hypertrophy or exudate. Neck:  Normal ROM. Supple. no anterior cervical adenopathy noted. Lungs: CTAB without wheezes, rales, or rhonchi. CV:  Regular rate and rhythm, normal heart sounds, without pathological murmurs, ectopy, gallops, or rubs. Skin:  Normal turgor. Warm, dry, without visible rash. Lymphatic: No lymphangitis or adenopathy noted. Neurological:  Oriented. Motor functions intact. Lab / Imaging Results   (All laboratory and radiology results have been personally reviewed by myself)  Labs:  No results found for this visit on 07/05/22. Imaging: All Radiology results interpreted by Radiologist unless otherwise noted. No results found. Medical Decision Making   Pt non-toxic, in no apparent distress and stable at time of discharge. Assessment/Plan   Salo Bernard was seen today for congestion.     Diagnoses and all orders for this visit:    Chronic cough  Comments:  Sample of Trelegy given 1 puff a day X 14 days   getting a Chest X-Ray and return if not improving  Orders:  -     XR CHEST (2 VW); Future  -     azithromycin (ZITHROMAX) 250 MG tablet; Take 1 tablet by mouth See Admin Instructions for 5 days 500mg on day 1 followed by 250mg on days 2 - 5    Acute recurrent sinusitis, unspecified location    Essential hypertension    Other orders  -     benzonatate (TESSALON) 200 MG capsule; Take 1 capsule by mouth 3 times daily as needed for Cough              Santana Gamma, APRN - CNP    This visit was provided as a focused evaluation during the [place] -19 pandemic/national emergency. A comprehensive review of all previous patient history and testing was not conducted. Pertinent findings were elicited during the visit. *NOTE: This report was transcribed using voice recognition software. Every effort was made to ensure accuracy; however, inadvertent computerized transcription errors may be present.

## 2022-07-05 NOTE — PATIENT INSTRUCTIONS
Patient Education        Cough: Care Instructions  Overview     A cough is your body's response to something that bothers your throat or airways. Many things can cause a cough. You might cough because of a cold or the flu, bronchitis, or asthma. Smoking, postnasal drip, allergies, and stomachacid that backs up into your throat also can cause coughs. A cough is a symptom, not a disease. Most coughs stop when the cause, such as acold, goes away. You can take a few steps at home to cough less and feel better. Follow-up care is a key part of your treatment and safety. Be sure to make and go to all appointments, and call your doctor if you are having problems. It's also a good idea to know your test results and keep alist of the medicines you take. How can you care for yourself at home?  Drink lots of water and other fluids. This helps thin the mucus and soothes a dry or sore throat. Honey or lemon juice in hot water or tea may ease a dry cough.  Take cough medicine as directed by your doctor.  Prop up your head on pillows to help you breathe and ease a dry cough.  Try cough drops or hard candy to soothe a dry or sore throat.  Do not smoke. Avoid secondhand smoke. If you need help quitting, talk to your doctor about stop-smoking programs and medicines. These can increase your chances of quitting for good. When should you call for help? Call 911 anytime you think you may need emergency care. For example, call if:     You have severe trouble breathing. Call your doctor now or seek immediate medical care if:     You cough up blood.      You have new or worse trouble breathing.      You have a new or higher fever.      You have a new rash.    Watch closely for changes in your health, and be sure to contact your doctor if:     You cough more deeply or more often, especially if you notice more mucus or a change in the color of your mucus.      You have new symptoms, such as a sore throat, an earache, or sinus pain.      You do not get better as expected. Where can you learn more? Go to https://chpepiceweb."[employer]. org and sign in to your [employer] account. Enter D279 in the [facility] box to learn more about \"Cough: Care Instructions. \"     If you do not have an account, please click on the \"Sign Up Now\" link. Current as of: March 9, 2022               Content Version: 13.3  © 2006-2022 [employer]. Care instructions adapted under license by [facility] ([facility]). If you have questions about a medical condition or this instruction, always ask your healthcare professional. [place] [address] any warranty or liability for your use of this information. Patient Education        Chronic Cough: Care Instructions  Overview     A cough is your body's response to something that bothers your throat or airways. Many things can cause a cough. You might cough because of a cold or the flu, bronchitis, or asthma. Smoking, postnasal drip, allergies, and stomachacid that backs up into your throat also can cause a cough. A cough can be short-term (acute) or long-term (chronic). A chronic cough lasts more than 8 weeks. A chronic cough is often caused by a long-term problem, suchas asthma. Another cause might be a medicine, such as an ACE inhibitor. A cough is a symptom, not a disease. To treat a chronic cough, you may need to treat the problem that causes it. You can take a few steps at home to coughless and feel better. Some people cough or clear their throat out of habit for no clear reason. Follow-up care is a key part of your treatment and safety. Be sure to make and go to all appointments, and call your doctor if you are having problems. It's also a good idea to know your test results and keep alist of the medicines you take. How can you care for yourself at home?  Drink plenty of water and other fluids. This may help soothe a dry or sore throat.  Honey or lemon juice in hot water or tea may ease a dry cough.  Prop up your head on pillows to help you breathe and ease a cough.  Do not smoke or allow others to smoke around you. Smoke can make a cough worse. If you need help quitting, talk to your doctor about stop-smoking programs and medicines. These can increase your chances of quitting for good.  Avoid exposure to smoke, dust, or other pollutants, or wear a face mask. Check with your doctor or pharmacist to find out which type of face mask will give you the most benefit.  Take cough medicine as directed by your doctor.  Try cough drops or hard candy to sooth a dry or sore throat. Throat clearing  When you have a chronic cough or a disease that may cause this type of cough, you may often feel like you want to clear your throat. This helps bring upmucus. But throat clearing does not always have a cause. Throat clearing can become a habit. The more you do it, the more you feel like you need to do it. But frequent throat clearing can be hard on your vocalcords. It's like slamming them together. To help lessen throat clearing, you can try:   Taking small sips of water.  Not clearing your throat when you feel you need to.  Swallowing hard when you want to clear your throat. You may want to ask your doctor if a medicine that thins mucus would help. When should you call for help? Call 911 anytime you think you may need emergency care. For example, call if:     You have severe trouble breathing. Call your doctor now or seek immediate medical care if:     You cough up blood.      You have new or worse trouble breathing.      You have a new or higher fever. Watch closely for changes in your health, and be sure to contact your doctor if:     You cough more deeply or more often, especially if you notice more mucus or a change in the color of your mucus.      You do not get better as expected. Where can you learn more?   Go to https://chpepiceweb.[employer]. org and sign in to your [employer] account. Enter N826 in the [employer] box to learn more about \"Chronic Cough: Care Instructions. \"     If you do not have an account, please click on the \"Sign Up Now\" link. Current as of: October 6, 2021               Content Version: 13.3  © 2006-2022 [employer]. Care instructions adapted under license by [facility] ([facility]). If you have questions about a medical condition or this instruction, always ask your healthcare professional. [place] [address] any warranty or liability for your use of this information. Patient Education        Sinusitis: Care Instructions  Your Care Instructions     Sinusitis is an infection of the lining of the sinus cavities in your head. Sinusitis often follows a cold. It causes pain and pressure in your head andface. In most cases, sinusitis gets better on its own in 1 to 2 weeks. But some mildsymptoms may last for several weeks. Sometimes antibiotics are needed. Follow-up care is a key part of your treatment and safety. Be sure to make and go to all appointments, and call your doctor if you are having problems. It's also a good idea to know your test results and keep alist of the medicines you take. How can you care for yourself at home?  Take an over-the-counter pain medicine, such as acetaminophen (Tylenol), ibuprofen (Advil, Motrin), or naproxen (Aleve). Read and follow all instructions on the label.  If the doctor prescribed antibiotics, take them as directed. Do not stop taking them just because you feel better. You need to take the full course of antibiotics.  Be careful when taking over-the-counter cold or flu medicines and Tylenol at the same time. Many of these medicines have acetaminophen, which is Tylenol. Read the labels to make sure that you are not taking more than the recommended dose. Too much acetaminophen (Tylenol) can be harmful.    [employer] disclaims any warranty or liability for your use of this information.

## 2022-07-11 ENCOUNTER — NURSE ONLY (OUTPATIENT)
Dept: FAMILY MEDICINE CLINIC | Age: 72
End: 2022-07-11
Payer: MEDICARE

## 2022-07-11 DIAGNOSIS — R53.82 CHRONIC FATIGUE: ICD-10-CM

## 2022-07-11 DIAGNOSIS — M25.512 PAIN IN JOINT OF LEFT SHOULDER: ICD-10-CM

## 2022-07-11 DIAGNOSIS — I10 ESSENTIAL HYPERTENSION, MALIGNANT: ICD-10-CM

## 2022-07-11 DIAGNOSIS — K58.9 IRRITABLE BOWEL SYNDROME WITHOUT DIARRHEA: ICD-10-CM

## 2022-07-11 DIAGNOSIS — F41.9 ANXIETY: ICD-10-CM

## 2022-07-11 DIAGNOSIS — E03.9 HYPOTHYROIDISM, UNSPECIFIED TYPE: ICD-10-CM

## 2022-07-11 DIAGNOSIS — E55.9 VITAMIN D DEFICIENCY: ICD-10-CM

## 2022-07-11 DIAGNOSIS — I60.9 SAH (SUBARACHNOID HEMORRHAGE) (HCC): ICD-10-CM

## 2022-07-11 DIAGNOSIS — E78.5 HYPERLIPIDEMIA, UNSPECIFIED HYPERLIPIDEMIA TYPE: ICD-10-CM

## 2022-07-11 DIAGNOSIS — M25.561 ACUTE PAIN OF RIGHT KNEE: ICD-10-CM

## 2022-07-11 LAB
BASOPHILS ABSOLUTE: 0.06 E9/L (ref 0–0.2)
BASOPHILS RELATIVE PERCENT: 1.1 % (ref 0–2)
EOSINOPHILS ABSOLUTE: 0.12 E9/L (ref 0.05–0.5)
EOSINOPHILS RELATIVE PERCENT: 2.1 % (ref 0–6)
HBA1C MFR BLD: 5.4 % (ref 4–5.6)
HCT VFR BLD CALC: 47.3 % (ref 34–48)
HEMOGLOBIN: 15 G/DL (ref 11.5–15.5)
IMMATURE GRANULOCYTES #: 0 E9/L
IMMATURE GRANULOCYTES %: 0 % (ref 0–5)
LYMPHOCYTES ABSOLUTE: 1.98 E9/L (ref 1.5–4)
LYMPHOCYTES RELATIVE PERCENT: 35.3 % (ref 20–42)
MCH RBC QN AUTO: 29.4 PG (ref 26–35)
MCHC RBC AUTO-ENTMCNC: 31.7 % (ref 32–34.5)
MCV RBC AUTO: 92.7 FL (ref 80–99.9)
MONOCYTES ABSOLUTE: 0.53 E9/L (ref 0.1–0.95)
MONOCYTES RELATIVE PERCENT: 9.4 % (ref 2–12)
NEUTROPHILS ABSOLUTE: 2.92 E9/L (ref 1.8–7.3)
NEUTROPHILS RELATIVE PERCENT: 52.1 % (ref 43–80)
PDW BLD-RTO: 14.2 FL (ref 11.5–15)
PLATELET # BLD: 247 E9/L (ref 130–450)
PMV BLD AUTO: 10.1 FL (ref 7–12)
RBC # BLD: 5.1 E12/L (ref 3.5–5.5)
WBC # BLD: 5.6 E9/L (ref 4.5–11.5)

## 2022-07-11 PROCEDURE — 99999 PR OFFICE/OUTPT VISIT,PROCEDURE ONLY: CPT | Performed by: FAMILY MEDICINE

## 2022-07-11 PROCEDURE — 36415 COLL VENOUS BLD VENIPUNCTURE: CPT | Performed by: FAMILY MEDICINE

## 2022-07-11 RX ORDER — ESTRADIOL AND NORETHINDRONE ACETATE .5; .1 MG/1; MG/1
1 TABLET ORAL DAILY
Qty: 90 TABLET | Refills: 1 | Status: SHIPPED | OUTPATIENT
Start: 2022-07-11 | End: 2023-01-07

## 2022-07-11 NOTE — TELEPHONE ENCOUNTER
Patients last appointment 6/20/2022.   Patients next scheduled appointment   Future Appointments   Date Time Provider Ruth Rose   7/22/2022 10:30 AM DO EFRAIN [facility]   11/7/2022  9:30 AM KINGS Thomas - CNP E. [facility]

## 2022-07-12 LAB
ALBUMIN SERPL-MCNC: 4.4 G/DL (ref 3.5–5.2)
ALP BLD-CCNC: 88 U/L (ref 35–104)
ALT SERPL-CCNC: 18 U/L (ref 0–32)
ANION GAP SERPL CALCULATED.3IONS-SCNC: 17 MMOL/L (ref 7–16)
AST SERPL-CCNC: 24 U/L (ref 0–31)
BILIRUB SERPL-MCNC: 0.8 MG/DL (ref 0–1.2)
BUN BLDV-MCNC: 15 MG/DL (ref 6–23)
CALCIUM SERPL-MCNC: 10.1 MG/DL (ref 8.6–10.2)
CHLORIDE BLD-SCNC: 101 MMOL/L (ref 98–107)
CHOLESTEROL, TOTAL: 147 MG/DL (ref 0–199)
CO2: 23 MMOL/L (ref 22–29)
CREAT SERPL-MCNC: 1.1 MG/DL (ref 0.5–1)
GFR AFRICAN AMERICAN: 59
GFR NON-AFRICAN AMERICAN: 49 ML/MIN/1.73
GLUCOSE BLD-MCNC: 77 MG/DL (ref 74–99)
HDLC SERPL-MCNC: 64 MG/DL
LDL CHOLESTEROL CALCULATED: 65 MG/DL (ref 0–99)
POTASSIUM SERPL-SCNC: 4 MMOL/L (ref 3.5–5)
SODIUM BLD-SCNC: 141 MMOL/L (ref 132–146)
T4 FREE: 1.16 NG/DL (ref 0.93–1.7)
TOTAL PROTEIN: 7.5 G/DL (ref 6.4–8.3)
TRIGL SERPL-MCNC: 92 MG/DL (ref 0–149)
TSH SERPL DL<=0.05 MIU/L-ACNC: 2.55 UIU/ML (ref 0.27–4.2)
VITAMIN D 25-HYDROXY: 42 NG/ML (ref 30–100)
VLDLC SERPL CALC-MCNC: 18 MG/DL

## 2022-07-15 RX ORDER — BENZONATATE 200 MG/1
200 CAPSULE ORAL 3 TIMES DAILY PRN
Qty: 90 CAPSULE | Refills: 1 | Status: SHIPPED | OUTPATIENT
Start: 2022-07-15 | End: 2022-08-14

## 2022-07-15 NOTE — TELEPHONE ENCOUNTER
Patients last appointment 6/20/2022. Patients next scheduled appointment   Future Appointments   Date Time Provider Ruth Rose   7/22/2022 10:30 AM DO EFRAIN [facility]   11/7/2022  9:30 AM KINGS Nice - CNP [facility]      Patient called and stated that she saw Abi Joseph and was given Tessalon for her cough. She still has a cough, only has 3 days left, and has an appointment with Dr. Sally Block on 7/22. Patient is requesting a 90 day supply.

## 2022-07-22 ENCOUNTER — OFFICE VISIT (OUTPATIENT)
Dept: FAMILY MEDICINE CLINIC | Age: 72
End: 2022-07-22
Payer: MEDICARE

## 2022-07-22 VITALS
BODY MASS INDEX: 32.55 KG/M2 | TEMPERATURE: 97.1 F | OXYGEN SATURATION: 97 % | WEIGHT: 172.4 LBS | SYSTOLIC BLOOD PRESSURE: 136 MMHG | HEART RATE: 69 BPM | HEIGHT: 61 IN | DIASTOLIC BLOOD PRESSURE: 78 MMHG

## 2022-07-22 DIAGNOSIS — J40 BRONCHITIS: Primary | ICD-10-CM

## 2022-07-22 DIAGNOSIS — E03.9 ACQUIRED HYPOTHYROIDISM: ICD-10-CM

## 2022-07-22 DIAGNOSIS — R05.3 CHRONIC COUGH: ICD-10-CM

## 2022-07-22 DIAGNOSIS — R53.82 CHRONIC FATIGUE: ICD-10-CM

## 2022-07-22 DIAGNOSIS — E03.9 HYPOTHYROIDISM, UNSPECIFIED TYPE: ICD-10-CM

## 2022-07-22 DIAGNOSIS — E55.9 VITAMIN D DEFICIENCY: ICD-10-CM

## 2022-07-22 DIAGNOSIS — J06.9 VIRAL URI: ICD-10-CM

## 2022-07-22 DIAGNOSIS — J01.91 ACUTE RECURRENT SINUSITIS, UNSPECIFIED LOCATION: ICD-10-CM

## 2022-07-22 DIAGNOSIS — I10 ESSENTIAL HYPERTENSION: ICD-10-CM

## 2022-07-22 DIAGNOSIS — R73.01 IFG (IMPAIRED FASTING GLUCOSE): ICD-10-CM

## 2022-07-22 DIAGNOSIS — M25.512 PAIN IN JOINT OF LEFT SHOULDER: ICD-10-CM

## 2022-07-22 DIAGNOSIS — K21.9 GASTROESOPHAGEAL REFLUX DISEASE WITHOUT ESOPHAGITIS: ICD-10-CM

## 2022-07-22 PROCEDURE — 1123F ACP DISCUSS/DSCN MKR DOCD: CPT | Performed by: FAMILY MEDICINE

## 2022-07-22 PROCEDURE — 96372 THER/PROPH/DIAG INJ SC/IM: CPT | Performed by: FAMILY MEDICINE

## 2022-07-22 PROCEDURE — 99214 OFFICE O/P EST MOD 30 MIN: CPT | Performed by: FAMILY MEDICINE

## 2022-07-22 RX ORDER — DEXAMETHASONE SODIUM PHOSPHATE 10 MG/ML
10 INJECTION INTRAMUSCULAR; INTRAVENOUS ONCE
Status: COMPLETED | OUTPATIENT
Start: 2022-07-22 | End: 2022-07-22

## 2022-07-22 RX ADMIN — DEXAMETHASONE SODIUM PHOSPHATE 10 MG: 10 INJECTION INTRAMUSCULAR; INTRAVENOUS at 12:13

## 2022-07-22 NOTE — PROGRESS NOTES
22  Jalen Chester : 1950 Sex: female  Age: 71 y.o. Chief Complaint   Patient presents with    Discuss Labs    Cough     Still coughing     Other     Got stung yesterday, right hand is swollen        Overall check up persistant cough   R hand sting hornet   R hand contusion   Diverticulosis flare       Review of Systems   Constitutional:  Negative for activity change, appetite change, chills, diaphoresis, fatigue, fever and unexpected weight change. HENT:  Positive for congestion, postnasal drip, rhinorrhea and sinus pressure. Negative for dental problem, drooling, ear discharge, ear pain, facial swelling, hearing loss, mouth sores, nosebleeds, sinus pain, sneezing, sore throat, tinnitus, trouble swallowing and voice change. Eyes:  Negative for photophobia, pain, discharge, redness, itching and visual disturbance. Respiratory:  Positive for cough. Negative for apnea, choking, chest tightness, shortness of breath, wheezing and stridor. Cardiovascular:  Negative for chest pain, palpitations and leg swelling. Gastrointestinal:  Negative for abdominal distention, abdominal pain, anal bleeding, blood in stool, constipation, diarrhea, nausea, rectal pain and vomiting. Endocrine: Negative for cold intolerance, heat intolerance, polydipsia, polyphagia and polyuria. Genitourinary:  Negative for decreased urine volume, difficulty urinating, dysuria, enuresis, flank pain, frequency, genital sores, hematuria and urgency. Musculoskeletal:  Negative for arthralgias, back pain, gait problem, joint swelling, myalgias, neck pain and neck stiffness. Skin:  Negative for color change, pallor, rash and wound. Allergic/Immunologic: Negative for environmental allergies, food allergies and immunocompromised state. Neurological:  Positive for headaches. Negative for dizziness, tremors, seizures, syncope, facial asymmetry, speech difficulty, weakness, light-headedness and numbness.    Hematological: Negative for adenopathy. Does not bruise/bleed easily. Psychiatric/Behavioral:  Negative for agitation, behavioral problems, confusion, decreased concentration, hallucinations, self-injury, sleep disturbance and suicidal ideas. The patient is not nervous/anxious and is not hyperactive. Physical Exam  Vitals and nursing note reviewed. Constitutional:       General: She is not in acute distress. Appearance: Normal appearance. She is normal weight. She is not ill-appearing, toxic-appearing or diaphoretic. HENT:      Head: Normocephalic and atraumatic. Right Ear: Ear canal and external ear normal. There is no impacted cerumen. Left Ear: Ear canal and external ear normal. There is no impacted cerumen. Ears:      Comments: TMs cloudy bilaterally     Nose: Congestion and rhinorrhea present. Mouth/Throat:      Mouth: Mucous membranes are moist.      Pharynx: Oropharynx is clear. Posterior oropharyngeal erythema present. No oropharyngeal exudate. Eyes:      General: No scleral icterus. Right eye: No discharge. Left eye: No discharge. Extraocular Movements: Extraocular movements intact. Conjunctiva/sclera: Conjunctivae normal.      Pupils: Pupils are equal, round, and reactive to light. Neck:      Vascular: No carotid bruit. Cardiovascular:      Rate and Rhythm: Normal rate and regular rhythm. Pulses: Normal pulses. Heart sounds: Normal heart sounds. No murmur heard. No friction rub. No gallop. Pulmonary:      Effort: Pulmonary effort is normal. No respiratory distress. Breath sounds: No stridor. No wheezing, rhonchi or rales. Chest:      Chest wall: No tenderness. Abdominal:      General: Abdomen is flat. Bowel sounds are normal. There is no distension. Palpations: Abdomen is soft. There is no mass. Tenderness: There is no abdominal tenderness. There is no right CVA tenderness, left CVA tenderness, guarding or rebound. Hernia: No hernia is present. Musculoskeletal:         General: No swelling, tenderness, deformity or signs of injury. Normal range of motion. Cervical back: Normal range of motion and neck supple. No rigidity. No muscular tenderness. Right lower leg: No edema. Left lower leg: No edema. Lymphadenopathy:      Cervical: No cervical adenopathy. Skin:     General: Skin is warm and dry. Capillary Refill: Capillary refill takes less than 2 seconds. Coloration: Skin is not jaundiced or pale. Findings: No bruising, erythema, lesion or rash. Neurological:      General: No focal deficit present. Mental Status: She is alert and oriented to person, place, and time. Mental status is at baseline. Cranial Nerves: No cranial nerve deficit. Sensory: No sensory deficit. Motor: No weakness. Coordination: Coordination normal.      Gait: Gait normal.      Deep Tendon Reflexes: Reflexes normal.   Psychiatric:         Mood and Affect: Mood normal.         Behavior: Behavior normal.         Thought Content: Thought content normal.         Judgment: Judgment normal.       Assessment and Plan:  Virginia Simeon was seen today for discuss labs, cough and other. Diagnoses and all orders for this visit:    Bronchitis    Viral URI    Vitamin D deficiency    Gastroesophageal reflux disease without esophagitis    Essential hypertension    IFG (impaired fasting glucose)    Acute recurrent sinusitis, unspecified location    Pain in joint of left shoulder    Hypothyroidism, unspecified type    Acquired hypothyroidism    Chronic fatigue    Chronic cough    Other orders  -     dexamethasone (DECADRON) injection 10 mg        Discussions/Education provided to patients during visit:  [] Discussed the importance to stop smoking. [x] Advised to monitor eating habits. [] Reviewed and discussed Imaging results. [x] Reviewed and discussed Lab results.   [x] Discussed the importance of drinking plenty of fluids. [x] Cut down on Salt, Caffeine, and Sugar.  [] Non Compliant Patient   [x] Communicated with patient any concerns, to phone office. Decided to go ahead and give her a shot of Decadron today so we did. And a remain on her cough medicine            Return in about 3 months (around 10/22/2022).       Seen by:     Chavez Goodman, DO

## 2022-07-27 ENCOUNTER — TELEPHONE (OUTPATIENT)
Dept: FAMILY MEDICINE CLINIC | Age: 72
End: 2022-07-27

## 2022-07-27 DIAGNOSIS — J40 BRONCHITIS: Primary | ICD-10-CM

## 2022-07-27 RX ORDER — METHYLPREDNISOLONE 4 MG/1
TABLET ORAL
Qty: 1 KIT | Refills: 0 | Status: SHIPPED | OUTPATIENT
Start: 2022-07-27 | End: 2022-08-02

## 2022-07-27 RX ORDER — AZITHROMYCIN 250 MG/1
250 TABLET, FILM COATED ORAL SEE ADMIN INSTRUCTIONS
Qty: 6 TABLET | Refills: 0 | Status: SHIPPED | OUTPATIENT
Start: 2022-07-27 | End: 2022-08-01

## 2022-07-27 NOTE — TELEPHONE ENCOUNTER
Inadvertently sent 2 rx to this pharmacy. Pt. Wanted [facility]. I Called [facility] and canceled the 2 rx we sent earlier today(Z-russell ,Medrol dose pack).

## 2022-08-14 ASSESSMENT — ENCOUNTER SYMPTOMS
COLOR CHANGE: 0
SORE THROAT: 0
RECTAL PAIN: 0
COUGH: 1
BACK PAIN: 0
ABDOMINAL DISTENTION: 0
SINUS PAIN: 0
RHINORRHEA: 1
EYE ITCHING: 0
CONSTIPATION: 0
EYE PAIN: 0
VOMITING: 0
ABDOMINAL PAIN: 0
SHORTNESS OF BREATH: 0
CHEST TIGHTNESS: 0
SINUS PRESSURE: 1
NAUSEA: 0
STRIDOR: 0
VOICE CHANGE: 0
TROUBLE SWALLOWING: 0
FACIAL SWELLING: 0
WHEEZING: 0
BLOOD IN STOOL: 0
APNEA: 0
DIARRHEA: 0
EYE DISCHARGE: 0
ANAL BLEEDING: 0
EYE REDNESS: 0
PHOTOPHOBIA: 0
CHOKING: 0

## 2022-08-29 RX ORDER — ROSUVASTATIN CALCIUM 10 MG/1
TABLET, COATED ORAL
Qty: 90 TABLET | Refills: 0 | Status: SHIPPED | OUTPATIENT
Start: 2022-08-29

## 2022-08-29 RX ORDER — PANTOPRAZOLE SODIUM 40 MG/1
TABLET, DELAYED RELEASE ORAL
Qty: 90 TABLET | Refills: 0 | Status: SHIPPED | OUTPATIENT
Start: 2022-08-29

## 2022-10-10 ENCOUNTER — TELEPHONE (OUTPATIENT)
Dept: FAMILY MEDICINE CLINIC | Age: 72
End: 2022-10-10

## 2022-10-10 RX ORDER — AMOXICILLIN AND CLAVULANATE POTASSIUM 875; 125 MG/1; MG/1
1 TABLET, FILM COATED ORAL 2 TIMES DAILY
Qty: 20 TABLET | Refills: 0 | Status: SHIPPED | OUTPATIENT
Start: 2022-10-10 | End: 2022-10-20

## 2022-10-10 NOTE — TELEPHONE ENCOUNTER
Patient called and stated that she has sinus pressure, ear pain, her eye \"feels like it wants to pop out of her head\", headache, and sinus congestion. She is requesting something be called into [facility].

## 2022-10-13 ENCOUNTER — HOSPITAL ENCOUNTER (OUTPATIENT)
Age: 72
Discharge: HOME | End: 2022-10-15
Payer: MEDICARE

## 2022-10-13 ENCOUNTER — HOSPITAL ENCOUNTER (OUTPATIENT)
Dept: GENERAL RADIOLOGY | Age: 72
Discharge: HOME | End: 2022-10-15
Payer: MEDICARE

## 2022-10-13 ENCOUNTER — TELEPHONE (OUTPATIENT)
Dept: FAMILY MEDICINE CLINIC | Age: 72
End: 2022-10-13

## 2022-10-13 DIAGNOSIS — J40 BRONCHITIS: ICD-10-CM

## 2022-10-13 DIAGNOSIS — J40 BRONCHITIS: Primary | ICD-10-CM

## 2022-10-13 PROCEDURE — 71046 X-RAY EXAM CHEST 2 VIEWS: CPT

## 2022-10-13 NOTE — TELEPHONE ENCOUNTER
Pt. Advised chest x-ray order placed. She can go any time to [place] [address] facility. She will go to [facility].

## 2022-10-13 NOTE — TELEPHONE ENCOUNTER
Pt states she is still not feeling well, made an appt with Dr. Skip Polk for Friday @ 945 am, however pt states that her back and chest are not feeling well and wondering if Dr. Skip Polk would like her to get a chest xray today so that he can see results tomorrow?    Please advise

## 2022-10-14 ENCOUNTER — OFFICE VISIT (OUTPATIENT)
Dept: FAMILY MEDICINE CLINIC | Age: 72
End: 2022-10-14
Payer: MEDICARE

## 2022-10-14 VITALS
OXYGEN SATURATION: 97 % | HEART RATE: 67 BPM | DIASTOLIC BLOOD PRESSURE: 68 MMHG | RESPIRATION RATE: 17 BRPM | SYSTOLIC BLOOD PRESSURE: 116 MMHG | TEMPERATURE: 98.5 F

## 2022-10-14 DIAGNOSIS — I10 ESSENTIAL HYPERTENSION: ICD-10-CM

## 2022-10-14 DIAGNOSIS — S06.5XAA SUBDURAL HEMATOMA: ICD-10-CM

## 2022-10-14 DIAGNOSIS — E55.9 VITAMIN D DEFICIENCY: ICD-10-CM

## 2022-10-14 DIAGNOSIS — I62.00 SUBDURAL HEMORRHAGE (HCC): ICD-10-CM

## 2022-10-14 DIAGNOSIS — R52 BODY ACHES: Primary | ICD-10-CM

## 2022-10-14 DIAGNOSIS — E03.9 ACQUIRED HYPOTHYROIDISM: ICD-10-CM

## 2022-10-14 DIAGNOSIS — K58.9 IRRITABLE BOWEL SYNDROME WITHOUT DIARRHEA: ICD-10-CM

## 2022-10-14 DIAGNOSIS — M25.561 ACUTE PAIN OF RIGHT KNEE: ICD-10-CM

## 2022-10-14 DIAGNOSIS — E78.5 HYPERLIPIDEMIA, UNSPECIFIED HYPERLIPIDEMIA TYPE: ICD-10-CM

## 2022-10-14 DIAGNOSIS — I10 ESSENTIAL HYPERTENSION, MALIGNANT: ICD-10-CM

## 2022-10-14 DIAGNOSIS — S62.304D CLOSED NONDISPLACED FRACTURE OF FOURTH METACARPAL BONE OF RIGHT HAND WITH ROUTINE HEALING, UNSPECIFIED PORTION OF METACARPAL, SUBSEQUENT ENCOUNTER: ICD-10-CM

## 2022-10-14 DIAGNOSIS — E03.9 HYPOTHYROIDISM, UNSPECIFIED TYPE: ICD-10-CM

## 2022-10-14 DIAGNOSIS — J02.9 SORE THROAT: ICD-10-CM

## 2022-10-14 DIAGNOSIS — R73.01 IFG (IMPAIRED FASTING GLUCOSE): ICD-10-CM

## 2022-10-14 LAB
INFLUENZA A ANTIBODY: NORMAL
INFLUENZA B ANTIBODY: NORMAL
Lab: ABNORMAL
PERFORMING INSTRUMENT: ABNORMAL
QC PASS/FAIL: ABNORMAL
S PYO AG THROAT QL: NORMAL
SARS-COV-2, POC: DETECTED

## 2022-10-14 PROCEDURE — 87880 STREP A ASSAY W/OPTIC: CPT | Performed by: FAMILY MEDICINE

## 2022-10-14 PROCEDURE — 87426 SARSCOV CORONAVIRUS AG IA: CPT | Performed by: FAMILY MEDICINE

## 2022-10-14 PROCEDURE — 3078F DIAST BP <80 MM HG: CPT | Performed by: FAMILY MEDICINE

## 2022-10-14 PROCEDURE — 99213 OFFICE O/P EST LOW 20 MIN: CPT | Performed by: FAMILY MEDICINE

## 2022-10-14 PROCEDURE — 1123F ACP DISCUSS/DSCN MKR DOCD: CPT | Performed by: FAMILY MEDICINE

## 2022-10-14 PROCEDURE — 3074F SYST BP LT 130 MM HG: CPT | Performed by: FAMILY MEDICINE

## 2022-10-14 PROCEDURE — 87804 INFLUENZA ASSAY W/OPTIC: CPT | Performed by: FAMILY MEDICINE

## 2022-10-14 RX ORDER — ALPRAZOLAM 1 MG/1
TABLET ORAL
COMMUNITY
Start: 2022-10-07

## 2022-10-14 RX ORDER — NIRMATRELVIR AND RITONAVIR 300-100 MG
KIT ORAL
Qty: 30 TABLET | Refills: 0 | Status: SHIPPED | OUTPATIENT
Start: 2022-10-14 | End: 2022-10-19

## 2022-10-14 RX ORDER — SERTRALINE HYDROCHLORIDE 25 MG/1
25 TABLET, FILM COATED ORAL DAILY
Qty: 30 TABLET | Refills: 5 | Status: SHIPPED | OUTPATIENT
Start: 2022-10-14

## 2022-10-14 RX ORDER — PREDNISONE 10 MG/1
10 TABLET ORAL DAILY
Qty: 10 TABLET | Refills: 0 | Status: SHIPPED | OUTPATIENT
Start: 2022-10-14 | End: 2022-10-24

## 2022-10-14 ASSESSMENT — ENCOUNTER SYMPTOMS
BLOOD IN STOOL: 0
CHEST TIGHTNESS: 0
SINUS PRESSURE: 1
VOMITING: 0
WHEEZING: 0
FACIAL SWELLING: 0
PHOTOPHOBIA: 0
APNEA: 0
EYE DISCHARGE: 0
ABDOMINAL PAIN: 0
CONSTIPATION: 0
STRIDOR: 0
COUGH: 1
VOICE CHANGE: 0
NAUSEA: 0
ANAL BLEEDING: 0
BACK PAIN: 0
EYE ITCHING: 0
ABDOMINAL DISTENTION: 0
CHOKING: 0
SORE THROAT: 1
EYE REDNESS: 0
COLOR CHANGE: 0
SINUS PAIN: 0
TROUBLE SWALLOWING: 0
DIARRHEA: 0
SHORTNESS OF BREATH: 0
RHINORRHEA: 1
RECTAL PAIN: 0
EYE PAIN: 0

## 2022-10-14 NOTE — PROGRESS NOTES
10/14/22  Maddison Talamantes : 1950 Sex: female  Age: 71 y.o. Chief Complaint   Patient presents with    Congestion     Sinus congestion, body aches, sore throat, ear pain, dizziness, started  night into Monday       HPI   started body aches   Congestion     Review of Systems   Constitutional:  Negative for activity change, appetite change, chills, diaphoresis, fatigue, fever and unexpected weight change. HENT:  Positive for congestion, postnasal drip, rhinorrhea, sinus pressure and sore throat. Negative for dental problem, drooling, ear discharge, ear pain, facial swelling, hearing loss, mouth sores, nosebleeds, sinus pain, sneezing, tinnitus, trouble swallowing and voice change. Eyes:  Negative for photophobia, pain, discharge, redness, itching and visual disturbance. Respiratory:  Positive for cough. Negative for apnea, choking, chest tightness, shortness of breath, wheezing and stridor. Chest hurts left side    Cardiovascular:  Negative for chest pain, palpitations and leg swelling. Gastrointestinal:  Negative for abdominal distention, abdominal pain, anal bleeding, blood in stool, constipation, diarrhea, nausea, rectal pain and vomiting. Endocrine: Negative for cold intolerance, heat intolerance, polydipsia, polyphagia and polyuria. Genitourinary:  Negative for decreased urine volume, difficulty urinating, dysuria, enuresis, flank pain, frequency, genital sores, hematuria and urgency. Musculoskeletal:  Positive for arthralgias. Negative for back pain, gait problem, joint swelling, myalgias, neck pain and neck stiffness. Skin:  Negative for color change, pallor, rash and wound. Allergic/Immunologic: Negative for environmental allergies, food allergies and immunocompromised state. Neurological:  Positive for headaches. Negative for dizziness, tremors, seizures, syncope, facial asymmetry, speech difficulty, weakness, light-headedness and numbness. Hematological:  Negative for adenopathy. Does not bruise/bleed easily. Psychiatric/Behavioral:  Negative for agitation, behavioral problems, confusion, decreased concentration, hallucinations, self-injury, sleep disturbance and suicidal ideas. The patient is not nervous/anxious and is not hyperactive. Physical Exam  Vitals and nursing note reviewed. Constitutional:       General: She is not in acute distress. Appearance: Normal appearance. She is normal weight. She is ill-appearing. She is not toxic-appearing or diaphoretic. HENT:      Head: Normocephalic and atraumatic. Right Ear: Ear canal and external ear normal. There is no impacted cerumen. Left Ear: Ear canal and external ear normal. There is no impacted cerumen. Ears:      Comments: Left tm infection     Nose: Congestion and rhinorrhea present. Mouth/Throat:      Mouth: Mucous membranes are moist.      Pharynx: Oropharynx is clear. Posterior oropharyngeal erythema present. No oropharyngeal exudate. Eyes:      General: No scleral icterus. Right eye: No discharge. Left eye: No discharge. Extraocular Movements: Extraocular movements intact. Conjunctiva/sclera: Conjunctivae normal.      Pupils: Pupils are equal, round, and reactive to light. Neck:      Vascular: No carotid bruit. Cardiovascular:      Rate and Rhythm: Normal rate and regular rhythm. Pulses: Normal pulses. Heart sounds: Normal heart sounds. No murmur heard. No friction rub. No gallop. Pulmonary:      Effort: Pulmonary effort is normal. No respiratory distress. Breath sounds: No stridor. No wheezing, rhonchi or rales. Chest:      Chest wall: No tenderness. Abdominal:      General: Abdomen is flat. Bowel sounds are normal. There is no distension. Palpations: Abdomen is soft. There is no mass. Tenderness: There is no abdominal tenderness.  There is no right CVA tenderness, left CVA tenderness, guarding or rebound. Hernia: No hernia is present. Musculoskeletal:         General: No swelling, tenderness, deformity or signs of injury. Normal range of motion. Cervical back: Normal range of motion and neck supple. No rigidity. No muscular tenderness. Right lower leg: No edema. Left lower leg: No edema. Lymphadenopathy:      Cervical: No cervical adenopathy. Skin:     General: Skin is warm and dry. Capillary Refill: Capillary refill takes less than 2 seconds. Coloration: Skin is not jaundiced or pale. Findings: No bruising, erythema, lesion or rash. Neurological:      General: No focal deficit present. Mental Status: She is alert and oriented to person, place, and time. Mental status is at baseline. Cranial Nerves: No cranial nerve deficit. Sensory: No sensory deficit. Motor: No weakness. Coordination: Coordination normal.      Gait: Gait normal.      Deep Tendon Reflexes: Reflexes normal.   Psychiatric:         Mood and Affect: Mood normal.         Behavior: Behavior normal.         Thought Content: Thought content normal.         Judgment: Judgment normal.       Assessment and Plan:  Ab Cantu was seen today for congestion.     Diagnoses and all orders for this visit:    Body aches  -     POCT Influenza A/B  -     POCT rapid strep A  -     POCT COVID-19, Antigen    Sore throat  -     POCT rapid strep A  -     POCT COVID-19, Antigen    Essential hypertension    Hypothyroidism, unspecified type    Vitamin D deficiency    Essential hypertension, malignant    Irritable bowel syndrome without diarrhea    Subdural hematoma    Closed nondisplaced fracture of fourth metacarpal bone of right hand with routine healing, unspecified portion of metacarpal, subsequent encounter    Hyperlipidemia, unspecified hyperlipidemia type    Acquired hypothyroidism    IFG (impaired fasting glucose)    Subdural hemorrhage (HCC)    Acute pain of right knee    Other orders  -     sertraline (ZOLOFT) 25 MG tablet; Take 1 tablet by mouth daily  -     nirmatrelvir/ritonavir (PAXLOVID, 300/100,) 20 x 150 MG & 10 x 100MG TBPK; Take 3 tablets (two 150 mg nirmatrelvir and one 100 mg ritonavir tablets) by mouth every 12 hours for 5 days. -     predniSONE (DELTASONE) 10 MG tablet; Take 1 tablet by mouth daily for 10 days        Discussions/Education provided to patients during visit:  [] Discussed the importance to stop smoking. [] Advised to monitor eating habits. [] Reviewed and discussed Imaging results. [x] Reviewed and discussed Lab results. [x] Discussed the importance of drinking plenty of fluids. [x] Cut down on Salt, Caffeine, and Sugar.  [] Non Compliant Patient   [x] Communicated with patient any concerns, to phone office. No follow-ups on file.       Seen by:     Amber Shepherd DO

## 2022-10-31 ENCOUNTER — OFFICE VISIT (OUTPATIENT)
Dept: FAMILY MEDICINE CLINIC | Age: 72
End: 2022-10-31
Payer: MEDICARE

## 2022-10-31 VITALS
DIASTOLIC BLOOD PRESSURE: 74 MMHG | HEART RATE: 60 BPM | RESPIRATION RATE: 17 BRPM | OXYGEN SATURATION: 98 % | BODY MASS INDEX: 31.51 KG/M2 | TEMPERATURE: 97.1 F | HEIGHT: 61 IN | WEIGHT: 166.9 LBS | SYSTOLIC BLOOD PRESSURE: 120 MMHG

## 2022-10-31 DIAGNOSIS — R53.82 CHRONIC FATIGUE: ICD-10-CM

## 2022-10-31 DIAGNOSIS — K58.9 IRRITABLE BOWEL SYNDROME WITHOUT DIARRHEA: ICD-10-CM

## 2022-10-31 DIAGNOSIS — I10 ESSENTIAL HYPERTENSION: ICD-10-CM

## 2022-10-31 DIAGNOSIS — U09.9 POST COVID-19 CONDITION, UNSPECIFIED: Primary | ICD-10-CM

## 2022-10-31 DIAGNOSIS — E03.9 HYPOTHYROIDISM, UNSPECIFIED TYPE: ICD-10-CM

## 2022-10-31 DIAGNOSIS — J01.91 ACUTE RECURRENT SINUSITIS, UNSPECIFIED LOCATION: ICD-10-CM

## 2022-10-31 DIAGNOSIS — I10 ESSENTIAL HYPERTENSION, MALIGNANT: ICD-10-CM

## 2022-10-31 DIAGNOSIS — K21.9 GASTROESOPHAGEAL REFLUX DISEASE WITHOUT ESOPHAGITIS: ICD-10-CM

## 2022-10-31 DIAGNOSIS — E78.2 MIXED HYPERLIPIDEMIA: ICD-10-CM

## 2022-10-31 DIAGNOSIS — R73.01 IFG (IMPAIRED FASTING GLUCOSE): ICD-10-CM

## 2022-10-31 DIAGNOSIS — E55.9 VITAMIN D DEFICIENCY: ICD-10-CM

## 2022-10-31 PROCEDURE — 3078F DIAST BP <80 MM HG: CPT | Performed by: FAMILY MEDICINE

## 2022-10-31 PROCEDURE — 1123F ACP DISCUSS/DSCN MKR DOCD: CPT | Performed by: FAMILY MEDICINE

## 2022-10-31 PROCEDURE — 99213 OFFICE O/P EST LOW 20 MIN: CPT | Performed by: FAMILY MEDICINE

## 2022-10-31 PROCEDURE — 3074F SYST BP LT 130 MM HG: CPT | Performed by: FAMILY MEDICINE

## 2022-10-31 ASSESSMENT — ENCOUNTER SYMPTOMS
NAUSEA: 0
VOICE CHANGE: 0
BACK PAIN: 0
FACIAL SWELLING: 0
RECTAL PAIN: 0
CHEST TIGHTNESS: 0
RHINORRHEA: 1
ABDOMINAL PAIN: 0
WHEEZING: 0
VOMITING: 0
ABDOMINAL DISTENTION: 0
SORE THROAT: 0
COUGH: 0
PHOTOPHOBIA: 0
DIARRHEA: 0
SINUS PAIN: 0
CONSTIPATION: 0
APNEA: 0
SHORTNESS OF BREATH: 0

## 2022-10-31 NOTE — PROGRESS NOTES
10/31/22  Marvin Swain : 1950 Sex: female  Age: 71 y.o. Chief Complaint   Patient presents with    Follow-up     3 month follow up       HPI overall check   Post covid and had a rough time sick for 3 weeks   Still some congestion     Review of Systems   Constitutional:  Negative for appetite change, chills, diaphoresis, fatigue, fever and unexpected weight change. HENT:  Positive for congestion, postnasal drip and rhinorrhea. Negative for dental problem, ear discharge, ear pain, facial swelling, hearing loss, mouth sores, sinus pain, sore throat, tinnitus and voice change. Eyes:  Negative for photophobia and visual disturbance (no visual changes). Respiratory:  Negative for apnea, cough, chest tightness, shortness of breath and wheezing. Cardiovascular:  Negative for chest pain and palpitations. Gastrointestinal:  Negative for abdominal distention, abdominal pain, constipation, diarrhea, nausea, rectal pain and vomiting. Endocrine: Negative for cold intolerance, heat intolerance and polyuria. Genitourinary:  Negative for dysuria, frequency, hematuria and urgency. Musculoskeletal:  Negative for arthralgias, back pain, gait problem, joint swelling, myalgias, neck pain and neck stiffness. Skin:  Negative for rash and wound. Allergic/Immunologic: Negative for environmental allergies. Neurological:  Negative for dizziness, tremors, seizures, syncope, facial asymmetry, speech difficulty, weakness, light-headedness, numbness and headaches. Hematological:  Does not bruise/bleed easily. Psychiatric/Behavioral:  Negative for agitation, hallucinations and suicidal ideas. The patient is not nervous/anxious. Physical Exam  Vitals reviewed. Constitutional:       General: She is not in acute distress. Appearance: She is not ill-appearing. HENT:      Head: Normocephalic. Right Ear: Tympanic membrane, ear canal and external ear normal. There is no impacted cerumen. Left Ear: Tympanic membrane, ear canal and external ear normal. There is no impacted cerumen. Nose: No rhinorrhea. Mouth/Throat:      Pharynx: No posterior oropharyngeal erythema. Eyes:      General:         Right eye: No discharge. Left eye: No discharge. Extraocular Movements: Extraocular movements intact. Pupils: Pupils are equal, round, and reactive to light. Neck:      Vascular: No carotid bruit. Cardiovascular:      Rate and Rhythm: Regular rhythm. Heart sounds: Normal heart sounds. No murmur heard. No gallop. Pulmonary:      Breath sounds: Rhonchi present. No wheezing or rales. Abdominal:      Palpations: Abdomen is soft. There is no mass. Tenderness: There is no abdominal tenderness. There is no guarding or rebound. Hernia: No hernia is present. Musculoskeletal:         General: Normal range of motion. Cervical back: Neck supple. Lymphadenopathy:      Cervical: No cervical adenopathy. Skin:     General: Skin is warm and dry. Findings: No bruising or rash. Neurological:      General: No focal deficit present. Mental Status: She is alert and oriented to person, place, and time. Cranial Nerves: No cranial nerve deficit. Motor: No weakness. Coordination: Coordination normal.      Gait: Gait normal.   Psychiatric:         Mood and Affect: Mood normal.         Behavior: Behavior normal.       Assessment and Plan:  Kane Cartagena was seen today for follow-up.     Diagnoses and all orders for this visit:    Post covid-19 condition, unspecified    Essential hypertension    Vitamin D deficiency    Acute recurrent sinusitis, unspecified location    Hypothyroidism, unspecified type    Chronic fatigue    Essential hypertension, malignant    Irritable bowel syndrome without diarrhea    Mixed hyperlipidemia    IFG (impaired fasting glucose)    Gastroesophageal reflux disease without esophagitis        Discussions/Education provided to patients during visit:  [] Discussed the importance to stop smoking. [] Advised to monitor eating habits. [] Reviewed and discussed Imaging results. [] Reviewed and discussed Lab results. [x] Discussed the importance of drinking plenty of fluids. [x] Cut down on Salt, Caffeine, and Sugar.  [] Non Compliant Patient   [x] Communicated with patient any concerns, to phone office. Continue to take antihistamines as needed  Need labs in January  Needs follow-up visit in January  Discussed the COVID situation with her lasting at least 3 weeks with his COVID and still having some coughing and congestion I feel is running its course and we do not need anything new now but if she recurs or anything like that she should call us. No follow-ups on file.       Seen by:     Mary Kay Dillard, DO

## 2022-11-05 SDOH — HEALTH STABILITY: PHYSICAL HEALTH: ON AVERAGE, HOW MANY DAYS PER WEEK DO YOU ENGAGE IN MODERATE TO STRENUOUS EXERCISE (LIKE A BRISK WALK)?: 3 DAYS

## 2022-11-05 SDOH — HEALTH STABILITY: PHYSICAL HEALTH: ON AVERAGE, HOW MANY MINUTES DO YOU ENGAGE IN EXERCISE AT THIS LEVEL?: 30 MIN

## 2022-11-05 ASSESSMENT — PATIENT HEALTH QUESTIONNAIRE - PHQ9
SUM OF ALL RESPONSES TO PHQ QUESTIONS 1-9: 0
1. LITTLE INTEREST OR PLEASURE IN DOING THINGS: 0
SUM OF ALL RESPONSES TO PHQ9 QUESTIONS 1 & 2: 0
2. FEELING DOWN, DEPRESSED OR HOPELESS: 0
SUM OF ALL RESPONSES TO PHQ QUESTIONS 1-9: 0

## 2022-11-05 ASSESSMENT — LIFESTYLE VARIABLES
HOW OFTEN DO YOU HAVE SIX OR MORE DRINKS ON ONE OCCASION: 1
HOW OFTEN DO YOU HAVE A DRINK CONTAINING ALCOHOL: 3
HOW MANY STANDARD DRINKS CONTAINING ALCOHOL DO YOU HAVE ON A TYPICAL DAY: 1
HOW MANY STANDARD DRINKS CONTAINING ALCOHOL DO YOU HAVE ON A TYPICAL DAY: 1 OR 2
HOW OFTEN DO YOU HAVE A DRINK CONTAINING ALCOHOL: 2-4 TIMES A MONTH

## 2022-11-07 ENCOUNTER — OFFICE VISIT (OUTPATIENT)
Dept: FAMILY MEDICINE CLINIC | Age: 72
End: 2022-11-07
Payer: MEDICARE

## 2022-11-07 ENCOUNTER — HOSPITAL ENCOUNTER (OUTPATIENT)
Dept: GENERAL RADIOLOGY | Age: 72
Discharge: HOME | End: 2022-11-09
Payer: MEDICARE

## 2022-11-07 ENCOUNTER — HOSPITAL ENCOUNTER (OUTPATIENT)
Age: 72
Discharge: HOME | End: 2022-11-09
Payer: MEDICARE

## 2022-11-07 VITALS
OXYGEN SATURATION: 96 % | DIASTOLIC BLOOD PRESSURE: 78 MMHG | WEIGHT: 165 LBS | RESPIRATION RATE: 18 BRPM | SYSTOLIC BLOOD PRESSURE: 120 MMHG | TEMPERATURE: 97.9 F | BODY MASS INDEX: 31.15 KG/M2 | HEART RATE: 80 BPM | HEIGHT: 61 IN

## 2022-11-07 VITALS
SYSTOLIC BLOOD PRESSURE: 120 MMHG | HEART RATE: 80 BPM | TEMPERATURE: 97.9 F | OXYGEN SATURATION: 96 % | DIASTOLIC BLOOD PRESSURE: 78 MMHG | RESPIRATION RATE: 18 BRPM | WEIGHT: 165 LBS | BODY MASS INDEX: 31.15 KG/M2 | HEIGHT: 61 IN

## 2022-11-07 DIAGNOSIS — S06.5XAA SUBDURAL HEMATOMA: ICD-10-CM

## 2022-11-07 DIAGNOSIS — K21.9 GASTROESOPHAGEAL REFLUX DISEASE WITHOUT ESOPHAGITIS: ICD-10-CM

## 2022-11-07 DIAGNOSIS — I10 ESSENTIAL HYPERTENSION: ICD-10-CM

## 2022-11-07 DIAGNOSIS — H57.89 MASS OF EYE, LEFT: Primary | ICD-10-CM

## 2022-11-07 DIAGNOSIS — E78.5 HYPERLIPIDEMIA, UNSPECIFIED HYPERLIPIDEMIA TYPE: ICD-10-CM
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
## 2022-11-07 DIAGNOSIS — R53.82 CHRONIC FATIGUE: ICD-10-CM

## 2022-11-07 DIAGNOSIS — R52 BODY ACHES: ICD-10-CM

## 2022-11-07 DIAGNOSIS — R73.01 IFG (IMPAIRED FASTING GLUCOSE): ICD-10-CM

## 2022-11-07 DIAGNOSIS — I10 ESSENTIAL HYPERTENSION, MALIGNANT: ICD-10-CM

## 2022-11-07 DIAGNOSIS — Z12.31 ENCOUNTER FOR SCREENING MAMMOGRAM FOR BREAST CANCER: ICD-10-CM

## 2022-11-07 DIAGNOSIS — Z12.31 ENCOUNTER FOR SCREENING MAMMOGRAM FOR MALIGNANT NEOPLASM OF BREAST: ICD-10-CM

## 2022-11-07 DIAGNOSIS — E78.2 MIXED HYPERLIPIDEMIA: ICD-10-CM

## 2022-11-07 DIAGNOSIS — Z00.00 MEDICARE ANNUAL WELLNESS VISIT, SUBSEQUENT: Primary | ICD-10-CM

## 2022-11-07 DIAGNOSIS — E03.9 HYPOTHYROIDISM, UNSPECIFIED TYPE: ICD-10-CM

## 2022-11-07 DIAGNOSIS — H57.89 MASS OF EYE, LEFT: ICD-10-CM

## 2022-11-07 DIAGNOSIS — E03.9 ACQUIRED HYPOTHYROIDISM: ICD-10-CM

## 2022-11-07 DIAGNOSIS — R51.9 FACIAL PAIN: ICD-10-CM

## 2022-11-07 DIAGNOSIS — K58.9 IRRITABLE BOWEL SYNDROME WITHOUT DIARRHEA: ICD-10-CM

## 2022-11-07 DIAGNOSIS — S43.004S DISLOCATION OF RIGHT SHOULDER JOINT, SEQUELA: ICD-10-CM

## 2022-11-07 DIAGNOSIS — J01.91 ACUTE RECURRENT SINUSITIS, UNSPECIFIED LOCATION: ICD-10-CM

## 2022-11-07 DIAGNOSIS — E55.9 VITAMIN D DEFICIENCY: ICD-10-CM

## 2022-11-07 DIAGNOSIS — S62.304D CLOSED NONDISPLACED FRACTURE OF FOURTH METACARPAL BONE OF RIGHT HAND WITH ROUTINE HEALING, UNSPECIFIED PORTION OF METACARPAL, SUBSEQUENT ENCOUNTER: ICD-10-CM

## 2022-11-07 PROCEDURE — 3078F DIAST BP <80 MM HG: CPT | Performed by: FAMILY MEDICINE

## 2022-11-07 PROCEDURE — 99214 OFFICE O/P EST MOD 30 MIN: CPT | Performed by: FAMILY MEDICINE

## 2022-11-07 PROCEDURE — 3074F SYST BP LT 130 MM HG: CPT | Performed by: FAMILY MEDICINE

## 2022-11-07 PROCEDURE — 1123F ACP DISCUSS/DSCN MKR DOCD: CPT | Performed by: NURSE PRACTITIONER

## 2022-11-07 PROCEDURE — 1123F ACP DISCUSS/DSCN MKR DOCD: CPT | Performed by: FAMILY MEDICINE

## 2022-11-07 PROCEDURE — 3074F SYST BP LT 130 MM HG: CPT | Performed by: NURSE PRACTITIONER

## 2022-11-07 PROCEDURE — G0439 PPPS, SUBSEQ VISIT: HCPCS | Performed by: NURSE PRACTITIONER

## 2022-11-07 PROCEDURE — 3078F DIAST BP <80 MM HG: CPT | Performed by: NURSE PRACTITIONER

## 2022-11-07 PROCEDURE — 70220 X-RAY EXAM OF SINUSES: CPT

## 2022-11-07 ASSESSMENT — VISUAL ACUITY
OD_CC: 20/25
OS_CC: 20/60

## 2022-11-07 NOTE — PATIENT INSTRUCTIONS
Body Mass Index: Care Instructions  Your Care Instructions     Body mass index (BMI) can help you see if your weight is raising your risk for health problems. It uses a formula to compare how much you weigh with how tall you are. A BMI lower than 18.5 is considered underweight. A BMI between 18.5 and 24.9 is considered healthy. A BMI between 25 and 29.9 is considered overweight. A BMI of 30 or higher is considered obese. If your BMI is in the normal range, it means that you have a lower risk for weight-related health problems. If your BMI is in the overweight or obese range, you may be at increased risk for weight-related health problems, such as high blood pressure, heart disease, stroke, arthritis or joint pain, and diabetes. If your BMI is in the underweight range, you may be at increased risk for health problems such as fatigue, lower protection (immunity) against illness, muscle loss, bone loss, hair loss, and hormone problems. BMI is just one measure of your risk for weight-related health problems. You may be at higher risk for health problems if you are not active, you eat an unhealthy diet, or you drink too much alcohol or use tobacco products. Follow-up care is a key part of your treatment and safety. Be sure to make and go to all appointments, and call your doctor if you are having problems. It's also a good idea to know your test results and keep a list of the medicines you take. How can you care for yourself at home? Practice healthy eating habits. This includes eating plenty of fruits, vegetables, whole grains, lean protein, and low-fat dairy. If your doctor recommends it, get more exercise. Walking is a good choice. Bit by bit, increase the amount you walk every day. Try for at least 30 minutes on most days of the week. Do not smoke. Smoking can increase your risk for health problems. If you need help quitting, talk to your doctor about stop-smoking programs and medicines.  These can increase your chances of quitting for good. Limit alcohol to 2 drinks a day for men and 1 drink a day for women. Too much alcohol can cause health problems. If you have a BMI higher than 25  Your doctor may do other tests to check your risk for weight-related health problems. This may include measuring the distance around your waist. A waist measurement of more than 40 inches in men or 35 inches in women can increase the risk of weight-related health problems. Talk with your doctor about steps you can take to stay healthy or improve your health. You may need to make lifestyle changes to lose weight and stay healthy, such as changing your diet and getting regular exercise. If you have a BMI lower than 18.5  Your doctor may do other tests to check your risk for health problems. Talk with your doctor about steps you can take to stay healthy or improve your health. You may need to make lifestyle changes to gain or maintain weight and stay healthy, such as getting more healthy foods in your diet and doing exercises to build muscle. Where can you learn more? Go to https://WiserTogethereliz.[employer]. org and sign in to your [employer] account. Enter S176 in the [facility] box to learn more about \"Body Mass Index: Care Instructions. \"     If you do not have an account, please click on the \"Sign Up Now\" link. Current as of: December 27, 2021               Content Version: 13.4  © 2006-2022 Healthwise, Incorporated. Care instructions adapted under license by [facility] ([facility]). If you have questions about a medical condition or this instruction, always ask your healthcare professional. [place] [address] any warranty or liability for your use of this information. Eating Healthy Foods: Care Instructions  Your Care Instructions     Eating healthy foods can help lower your risk for disease.  Healthy food gives you energy and keeps your heart strong, your brain active, your muscles working, and your bones strong. A healthy diet includes a variety of foods from the basic food groups: grains, vegetables, fruits, milk and milk products, and meat and beans. Some people may eat more of their favorite foods from only one food group and, as a result, miss getting the nutrients they need. So, it is important to pay attention not only to what you eat but also to what you are missing from your diet. You can eat a healthy, balanced diet by making a few small changes. Follow-up care is a key part of your treatment and safety. Be sure to make and go to all appointments, and call your doctor if you are having problems. It's also a good idea to know your test results and keep a list of the medicines you take. How can you care for yourself at home? Look at what you eat  Keep a food diary for a week or two and record everything you eat or drink. Track the number of servings you eat from each food group. For a balanced diet every day, eat a variety of:  6 or more ounce-equivalents of grains, such as cereals, breads, crackers, rice, or pasta, every day. An ounce-equivalent is 1 slice of bread, 1 cup of ready-to-eat cereal, or ½ cup of cooked rice, cooked pasta, or cooked cereal.  2½ cups of vegetables, especially:  Dark-green vegetables such as broccoli and spinach. Orange vegetables such as carrots and sweet potatoes. Dry beans (such as schumacher and kidney beans) and peas (such as lentils). 2 cups of fresh, frozen, or canned fruit. A small apple or 1 banana or orange equals 1 cup.  3 cups of nonfat or low-fat milk, yogurt, or other milk products. 5½ ounces of meat and beans, such as chicken, fish, lean meat, beans, nuts, and seeds. One egg, 1 tablespoon of peanut butter, ½ ounce nuts or seeds, or ¼ cup of cooked beans equals 1 ounce of meat. Learn how to read food labels for serving sizes and ingredients. Fast-food and convenience-food meals often contain few or no fruits or vegetables.  Make sure you eat some fruits and vegetables to make the meal more nutritious. Look at your food diary. For each food group, add up what you have eaten and then divide the total by the number of days. This will give you an idea of how much you are eating from each food group. See if you can find some ways to change your diet to make it more healthy. Start small  Do not try to make dramatic changes to your diet all at once. You might feel that you are missing out on your favorite foods and then be more likely to fail. Start slowly, and gradually change your habits. Try some of the following:  Use whole wheat bread instead of white bread. Use nonfat or low-fat milk instead of whole milk. Eat brown rice instead of white rice, and eat whole wheat pasta instead of white-flour pasta. Try low-fat cheeses and low-fat yogurt. Add more fruits and vegetables to meals and have them for snacks. Add lettuce, tomato, cucumber, and onion to sandwiches. Add fruit to yogurt and cereal.  Enjoy food  You can still eat your favorite foods. You just may need to eat less of them. If your favorite foods are high in fat, salt, and sugar, limit how often you eat them, but do not cut them out entirely. Eat a wide variety of foods. Make healthy choices when eating out  The type of restaurant you choose can help you make healthy choices. Even fast-food chains are now offering more low-fat or healthier choices on the menu. Choose smaller portions, or take half of your meal home. When eating out, try:  A veggie pizza with a whole wheat crust or grilled chicken (instead of sausage or pepperoni). Pasta with roasted vegetables, grilled chicken, or marinara sauce instead of cream sauce. A vegetable wrap or grilled chicken wrap. Broiled or poached food instead of fried or breaded items.   Make healthy choices easy  Buy packaged, prewashed, ready-to-eat fresh vegetables and fruits, such as baby carrots, salad mixes, and chopped or shredded broccoli and cauliflower. Buy packaged, presliced fruits, such as melon or pineapple. Choose 100% fruit or vegetable juice instead of soda. Limit juice intake to 4 to 6 oz (½ to ¾ cup) a day. Blend low-fat yogurt, fruit juice, and canned or frozen fruit to make a smoothie for breakfast or a snack. Where can you learn more? Go to https://Relevant e-solutionpepiceweb.[employer]. org and sign in to your [employer] account. Enter I108 in the [employer] box to learn more about \"Eating Healthy Foods: Care Instructions. \"     If you do not have an account, please click on the \"Sign Up Now\" link. Current as of: May 9, 2022               Content Version: 13.4  © 0479-4382 Healthwise, Incorporated. Care instructions adapted under license by [facility]. If you have questions about a medical condition or this instruction, always ask your healthcare professional. [place] [address] any warranty or liability for your use of this information. Personalized Preventive Plan for Carol January - 11/7/2022  Medicare offers a range of preventive health benefits. Some of the tests and screenings are paid in full while other may be subject to a deductible, co-insurance, and/or copay. Some of these benefits include a comprehensive review of your medical history including lifestyle, illnesses that may run in your family, and various assessments and screenings as appropriate. After reviewing your medical record and screening and assessments performed today your provider may have ordered immunizations, labs, imaging, and/or referrals for you. A list of these orders (if applicable) as well as your Preventive Care list are included within your After Visit Summary for your review. Other Preventive Recommendations:    A preventive eye exam performed by an eye specialist is recommended every 1-2 years to screen for glaucoma; cataracts, macular degeneration, and other eye disorders.   A preventive dental visit is recommended every 6 months. Try to get at least 150 minutes of exercise per week or 10,000 steps per day on a pedometer . Order or download the FREE \"Exercise & Physical Activity: Your Everyday Guide\" from [employer]. Call 5-378.565.2433 or search [employer] online. You need 6561-4380 mg of calcium and 0432-8704 IU of vitamin D per day. It is possible to meet your calcium requirement with diet alone, but a vitamin D supplement is usually necessary to meet this goal.  When exposed to the sun, use a sunscreen that protects against both UVA and UVB radiation with an SPF of 30 or greater. Reapply every 2 to 3 hours or after sweating, drying off with a towel, or swimming. Always wear a seat belt when traveling in a car. Always wear a helmet when riding a bicycle or motorcycle.

## 2022-11-07 NOTE — PROGRESS NOTES
Medicare Annual Wellness Visit    Marcio Amor is here for Medicare AWV (Awv today )    Assessment & Plan   Medicare annual wellness visit, subsequent  Encounter for screening mammogram for malignant neoplasm of breast  -     [facility]; Future  Encounter for screening mammogram for breast cancer  -     [facility]; Future    Recommendations for Preventive Services Due: see orders and patient instructions/AVS.  Recommended screening schedule for the next 5-10 years is provided to the patient in written form: see Patient Instructions/AVS.     Return for Medicare Annual Wellness Visit in 1 year. Subjective   The following acute and/or chronic problems were also addressed today:    Patient's complete Health Risk Assessment and screening values have been reviewed and are found in Flowsheets. The following problems were reviewed today and where indicated follow up appointments were made and/or referrals ordered.     Positive Risk Factor Screenings with Interventions:              Health Habits/Nutrition:  Physical Activity: Insufficiently Active    Days of Exercise per Week: 3 days    Minutes of Exercise per Session: 30 min     Have you lost any weight without trying in the past 3 months?: No  Body mass index: (!) 31.17  Have you seen the dentist within the past year?: Yes  Health Habits/Nutrition Interventions:  Inadequate physical activity:  patient is not ready to increase his/her physical activity level at this time    Hearing/Vision:  Do you or your family notice any trouble with your hearing that hasn't been managed with hearing aids?: No  Do you have difficulty driving, watching TV, or doing any of your daily activities because of your eyesight?: No  Have you had an eye exam within the past year?: Yes  Vision Screening    Right eye Left eye Both eyes   Without correction      With correction 20/25 20/60      Hearing/Vision Interventions:  none            Objective Vitals:    11/07/22 0936   BP: 120/78   Pulse: 80   Resp: 18   Temp: 97.9 °F (36.6 °C)   TempSrc: Temporal   SpO2: 96%   Weight: 165 lb (74.8 kg)   Height: 5' 1\" (1.549 m)      Body mass index is 31.18 kg/m². Allergies   Allergen Reactions    Albumin (Human) Hives    Bee Venom Hives    Eggs Or Egg-Derived Products [Eggs Or Egg-Derived Products] Nausea And Vomiting and Swelling    Norco [Hydrocodone-Acetaminophen] Hives and Itching    Theophyllines Nausea And Vomiting     Prior to Visit Medications    Medication Sig Taking?  Authorizing Provider   ALPRAZolam Angel Solan) 1 MG tablet  Yes Historical Provider, MD   sertraline (ZOLOFT) 25 MG tablet Take 1 tablet by mouth daily Yes Nava Sam DO   rosuvastatin (CRESTOR) 10 MG tablet Take 1 tablet by mouth once daily Yes KINGS Oneill CNP   pantoprazole (PROTONIX) 40 MG tablet Take 1 tablet by mouth once daily Yes KINGS Arellano - CNP   Estradiol-Norethindrone Acet (ACTIVELLA) 0.5-0.1 MG TABS Take 1 tablet by mouth daily Yes Nava Sam DO   SYNTHROID 25 MCG tablet Take 1 tablet by mouth Daily Yes KINGS Oneill CNP   triamterene-hydroCHLOROthiazide (MAXZIDE-25) 37.5-25 MG per tablet Take 1 tablet by mouth daily Yes Nava Sam DO   amLODIPine (NORVASC) 5 MG tablet Take 1 tablet by mouth once daily Yes Nava Sam DO   vitamin k 100 MCG tablet Take 100 mcg by mouth daily Yes Historical Provider, MD   fluticasone (FLONASE) 50 MCG/ACT nasal spray 2 sprays by Each Nostril route daily Yes Nava Sam DO   Handicap Placard MISC Permanent  Disability   5 years Yes Nava Sam DO   Homeopathic Products (ARNICA EX) Apply topically Yes Historical Provider, MD   Homeopathic Products (ARNICARE ARTHRITIS PO) Take by mouth Yes Historical Provider, MD   Zinc 100 MG TABS Take 1 tablet by mouth daily Yes Historical Provider, MD   Probiotic Product (PROBIOTIC DAILY PO) Take 1 tablet by mouth daily Yes Historical Provider, MD butalbital-acetaminophen-caffeine (FIORICET, ESGIC) -40 MG per tablet Take 2 tablets by mouth every 4 hours as needed for Headaches Yes Elder Myles DO   b complex vitamins capsule Take 1 capsule by mouth daily Ld 1/28/2019 Yes Historical Provider, MD   vitamin D3 (CHOLECALCIFEROL) 400 units TABS tablet Take 400 Units by mouth daily Ld 1/28/2019 Yes Historical Provider, MD   hyoscyamine (ANASPAZ;LEVSIN) 0.125 MG tablet Take 0.125 mg by mouth every 8 hours as needed for Cramping Yes Historical Provider, MD   multivitamin [facility]) per tablet Take 1 tablet by mouth daily Ld 1/28/2019 Yes Historical Provider, MD Rain (Including outside providers/suppliers regularly involved in providing care):   Patient Care Team:  Joel Mao DO as PCP - General (Family Medicine)  Joel Mao DO as PCP - [facility] Empaneled Provider  DO Dileep Asencio DO as Consulting Physician [facility])     Reviewed and updated this visit:  Tobacco  Allergies  Meds  Med Hx  Surg Hx  Soc Hx  Fam Hx
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
## 2022-11-14 ENCOUNTER — OFFICE VISIT (OUTPATIENT)
Dept: FAMILY MEDICINE CLINIC | Age: 72
End: 2022-11-14
Payer: MEDICARE

## 2022-11-14 VITALS
SYSTOLIC BLOOD PRESSURE: 132 MMHG | RESPIRATION RATE: 18 BRPM | BODY MASS INDEX: 31.15 KG/M2 | WEIGHT: 165 LBS | HEIGHT: 61 IN | DIASTOLIC BLOOD PRESSURE: 74 MMHG | HEART RATE: 88 BPM | OXYGEN SATURATION: 98 % | TEMPERATURE: 97.8 F

## 2022-11-14 DIAGNOSIS — E03.9 HYPOTHYROIDISM, UNSPECIFIED TYPE: ICD-10-CM

## 2022-11-14 DIAGNOSIS — E55.9 VITAMIN D DEFICIENCY: ICD-10-CM

## 2022-11-14 DIAGNOSIS — I62.00 SUBDURAL HEMORRHAGE (HCC): ICD-10-CM

## 2022-11-14 DIAGNOSIS — H57.89 MASS OF EYE, LEFT: Primary | ICD-10-CM

## 2022-11-14 DIAGNOSIS — R73.01 IFG (IMPAIRED FASTING GLUCOSE): ICD-10-CM

## 2022-11-14 DIAGNOSIS — I10 ESSENTIAL HYPERTENSION, MALIGNANT: ICD-10-CM

## 2022-11-14 DIAGNOSIS — Z12.31 SCREENING MAMMOGRAM FOR HIGH-RISK PATIENT: ICD-10-CM

## 2022-11-14 DIAGNOSIS — S06.5XAA SUBDURAL HEMATOMA: ICD-10-CM

## 2022-11-14 DIAGNOSIS — H57.89: ICD-10-CM

## 2022-11-14 DIAGNOSIS — R53.82 CHRONIC FATIGUE: ICD-10-CM

## 2022-11-14 DIAGNOSIS — I10 ESSENTIAL HYPERTENSION: ICD-10-CM

## 2022-11-14 DIAGNOSIS — E78.2 MIXED HYPERLIPIDEMIA: ICD-10-CM

## 2022-11-14 DIAGNOSIS — K58.9 IRRITABLE BOWEL SYNDROME WITHOUT DIARRHEA: ICD-10-CM

## 2022-11-14 PROCEDURE — 1123F ACP DISCUSS/DSCN MKR DOCD: CPT | Performed by: FAMILY MEDICINE

## 2022-11-14 PROCEDURE — 99215 OFFICE O/P EST HI 40 MIN: CPT | Performed by: FAMILY MEDICINE

## 2022-11-14 PROCEDURE — 3074F SYST BP LT 130 MM HG: CPT | Performed by: FAMILY MEDICINE

## 2022-11-14 PROCEDURE — 3078F DIAST BP <80 MM HG: CPT | Performed by: FAMILY MEDICINE

## 2022-11-14 ASSESSMENT — ENCOUNTER SYMPTOMS
PHOTOPHOBIA: 0
DIARRHEA: 0
ABDOMINAL DISTENTION: 0
ABDOMINAL PAIN: 1
SORE THROAT: 0
APNEA: 0
VOICE CHANGE: 0
SINUS PAIN: 1
WHEEZING: 0
NAUSEA: 0
VOMITING: 0
RECTAL PAIN: 0
SHORTNESS OF BREATH: 0
RHINORRHEA: 1
CHEST TIGHTNESS: 0
CONSTIPATION: 0
COUGH: 0
FACIAL SWELLING: 0
BACK PAIN: 0

## 2022-11-14 NOTE — PROGRESS NOTES
22  Eveline Marks : 1950 Sex: female  Age: 71 y.o. Chief Complaint   Patient presents with    Other     HERE TO DISCUSS ISSUES        HPI Mendel Mealy was called in today to go over my discussion with Dr. Fannie Schwartz from [facility]. She unfortunately has a mass in her eye and doctor is worried that this is possibly a metastatic lesion and feels that Mendel Mealy needs a metastatic work-up I interviewed her today and told her about that situation and that is what were working on today. Review of Systems   Constitutional:  Negative for appetite change, chills, diaphoresis, fatigue, fever and unexpected weight change. HENT:  Positive for rhinorrhea and sinus pain. Negative for congestion, dental problem, ear discharge, ear pain, facial swelling, hearing loss, mouth sores, sore throat, tinnitus and voice change. Eyes:  Negative for photophobia and visual disturbance (no visual changes). Respiratory:  Negative for apnea, cough, chest tightness, shortness of breath and wheezing. Cardiovascular:  Negative for chest pain and palpitations. Gastrointestinal:  Positive for abdominal pain (very occasional ,no hematechesis). Negative for abdominal distention, constipation, diarrhea, nausea, rectal pain and vomiting. Endocrine: Negative for cold intolerance, heat intolerance and polyuria. Genitourinary:  Negative for dysuria, frequency, hematuria and urgency. No hematuria   Musculoskeletal:  Negative for arthralgias, back pain, gait problem, joint swelling, myalgias, neck pain and neck stiffness. Skin:  Negative for rash and wound. Allergic/Immunologic: Negative for environmental allergies. Neurological:  Negative for dizziness, tremors, seizures, syncope, facial asymmetry, speech difficulty, weakness, light-headedness, numbness and headaches. Hematological:  Does not bruise/bleed easily.    Psychiatric/Behavioral:  Negative for agitation, hallucinations and suicidal ideas. The patient is not nervous/anxious. Physical Exam  Vitals reviewed. Constitutional:       General: She is not in acute distress. Appearance: She is not ill-appearing. HENT:      Head: Normocephalic. Right Ear: Tympanic membrane, ear canal and external ear normal. There is no impacted cerumen. Left Ear: Tympanic membrane, ear canal and external ear normal. There is no impacted cerumen. Nose: No rhinorrhea. Mouth/Throat:      Pharynx: No posterior oropharyngeal erythema. Eyes:      General:         Right eye: No discharge. Left eye: No discharge. Extraocular Movements: Extraocular movements intact. Pupils: Pupils are equal, round, and reactive to light. Neck:      Vascular: No carotid bruit. Cardiovascular:      Rate and Rhythm: Regular rhythm. Heart sounds: Normal heart sounds. No murmur heard. No gallop. Pulmonary:      Breath sounds: No wheezing or rales. Abdominal:      Palpations: Abdomen is soft. There is no mass. Tenderness: There is no abdominal tenderness. There is no guarding or rebound. Hernia: No hernia is present. Musculoskeletal:         General: Normal range of motion. Cervical back: Neck supple. Lymphadenopathy:      Cervical: No cervical adenopathy. Skin:     General: Skin is warm and dry. Findings: No bruising or rash. Neurological:      General: No focal deficit present. Mental Status: She is alert and oriented to person, place, and time. Cranial Nerves: No cranial nerve deficit. Motor: No weakness. Coordination: Coordination normal.      Gait: Gait normal.   Psychiatric:         Mood and Affect: Mood normal.         Behavior: Behavior normal.       Assessment and Plan:  Av Hernandez was seen today for other.     Diagnoses and all orders for this visit:    Mass of eye, left    Essential hypertension    Chronic fatigue    Irritable bowel syndrome without diarrhea    IFG (impaired fasting glucose)    Subdural hematoma    Subdural hemorrhage (HCC)    Vitamin D deficiency    Hypothyroidism, unspecified type    Essential hypertension, malignant    Mixed hyperlipidemia        Discussions/Education provided to patients during visit:  [] Discussed the importance to stop smoking. [] Advised to monitor eating habits. [] Reviewed and discussed Imaging results. [] Reviewed and discussed Lab results. [] Discussed the importance of drinking plenty of fluids. [] Cut down on Salt, Caffeine, and Sugar.  [] Non Compliant Patient   [x] Communicated with patient any concerns, to phone office. LABS Maya Barros , was done at the request of Dr. Mayda Jay above, all are ordered and placed orders for [address] [facility] in [address] ,[place] CHEST ,ABDOMEN PELVIS   MAMMOGRAM  AT [facility] 112 2018 AND WAS NORMAL NO POLYPS ,EGD ALSO NORMAL ,WILL DISCUSS WITH DR Telma PALAFOX ON FEASABILITY OF REPEATING SCOPES     AFTER THESE DONE WILL REGROUP WITH DR MURRAY AND SEE WHERE TO GO FROM THERE  This case entailed extensive discussions discussion with Dr. Na Wade for 30 minutes  And discussion with the patient for 30 to 40 minutes. Because of the new mass in the eye we are concerned about metastatic disease possibility of this spreading to her EYE. Made mistake on her ct of chest should be with contrats . No follow-ups on file.       Seen by:     Ignacio Aleman DO

## 2022-11-16 ENCOUNTER — HOSPITAL ENCOUNTER (OUTPATIENT)
Age: 72
Discharge: HOME | End: 2022-11-16
Payer: MEDICARE

## 2022-11-16 DIAGNOSIS — E55.9 VITAMIN D DEFICIENCY: ICD-10-CM

## 2022-11-16 DIAGNOSIS — H57.89: ICD-10-CM

## 2022-11-16 DIAGNOSIS — Z12.31 SCREENING MAMMOGRAM FOR HIGH-RISK PATIENT: ICD-10-CM

## 2022-11-16 DIAGNOSIS — S06.5XAA SUBDURAL HEMATOMA: ICD-10-CM

## 2022-11-16 DIAGNOSIS — I62.00 SUBDURAL HEMORRHAGE (HCC): ICD-10-CM

## 2022-11-16 DIAGNOSIS — R73.01 IFG (IMPAIRED FASTING GLUCOSE): ICD-10-CM

## 2022-11-16 DIAGNOSIS — R53.82 CHRONIC FATIGUE: ICD-10-CM

## 2022-11-16 DIAGNOSIS — E78.2 MIXED HYPERLIPIDEMIA: ICD-10-CM

## 2022-11-16 DIAGNOSIS — I10 ESSENTIAL HYPERTENSION, MALIGNANT: ICD-10-CM

## 2022-11-16 DIAGNOSIS — E03.9 HYPOTHYROIDISM, UNSPECIFIED TYPE: ICD-10-CM

## 2022-11-16 DIAGNOSIS — K58.9 IRRITABLE BOWEL SYNDROME WITHOUT DIARRHEA: ICD-10-CM

## 2022-11-16 DIAGNOSIS — H57.89 MASS OF EYE, LEFT: ICD-10-CM

## 2022-11-16 DIAGNOSIS — I10 ESSENTIAL HYPERTENSION: ICD-10-CM

## 2022-11-16 LAB
ALBUMIN SERPL-MCNC: 4.3 G/DL (ref 3.5–5.2)
ALP BLD-CCNC: 96 U/L (ref 35–104)
ALT SERPL-CCNC: 46 U/L (ref 0–32)
ANION GAP SERPL CALCULATED.3IONS-SCNC: 11 MMOL/L (ref 7–16)
AST SERPL-CCNC: 51 U/L (ref 0–31)
BASOPHILS ABSOLUTE: 0.06 E9/L (ref 0–0.2)
BASOPHILS RELATIVE PERCENT: 0.9 % (ref 0–2)
BILIRUB SERPL-MCNC: 0.6 MG/DL (ref 0–1.2)
BILIRUBIN DIRECT: <0.2 MG/DL (ref 0–0.3)
BILIRUBIN, INDIRECT: ABNORMAL MG/DL (ref 0–1)
BUN BLDV-MCNC: 16 MG/DL (ref 6–23)
C-REACTIVE PROTEIN: 0.3 MG/DL (ref 0–0.4)
CALCIUM SERPL-MCNC: 10 MG/DL (ref 8.6–10.2)
CHLORIDE BLD-SCNC: 103 MMOL/L (ref 98–107)
CHOLESTEROL, TOTAL: 131 MG/DL (ref 0–199)
CO2: 26 MMOL/L (ref 22–29)
CREAT SERPL-MCNC: 1 MG/DL (ref 0.5–1)
EOSINOPHILS ABSOLUTE: 0.12 E9/L (ref 0.05–0.5)
EOSINOPHILS RELATIVE PERCENT: 1.8 % (ref 0–6)
GFR SERPL CREATININE-BSD FRML MDRD: >60 ML/MIN/1.73
GLUCOSE BLD-MCNC: 99 MG/DL (ref 74–99)
HCT VFR BLD CALC: 46.4 % (ref 34–48)
HDLC SERPL-MCNC: 57 MG/DL
HEMOGLOBIN: 15.1 G/DL (ref 11.5–15.5)
IMMATURE GRANULOCYTES #: 0.01 E9/L
IMMATURE GRANULOCYTES %: 0.2 % (ref 0–5)
LDL CHOLESTEROL CALCULATED: 56 MG/DL (ref 0–99)
LYMPHOCYTES ABSOLUTE: 1.83 E9/L (ref 1.5–4)
LYMPHOCYTES RELATIVE PERCENT: 27.9 % (ref 20–42)
MCH RBC QN AUTO: 29 PG (ref 26–35)
MCHC RBC AUTO-ENTMCNC: 32.5 % (ref 32–34.5)
MCV RBC AUTO: 89.1 FL (ref 80–99.9)
MONOCYTES ABSOLUTE: 0.58 E9/L (ref 0.1–0.95)
MONOCYTES RELATIVE PERCENT: 8.8 % (ref 2–12)
NEUTROPHILS ABSOLUTE: 3.96 E9/L (ref 1.8–7.3)
NEUTROPHILS RELATIVE PERCENT: 60.4 % (ref 43–80)
PDW BLD-RTO: 13.7 FL (ref 11.5–15)
PLATELET # BLD: 242 E9/L (ref 130–450)
PMV BLD AUTO: 9.3 FL (ref 7–12)
POTASSIUM SERPL-SCNC: 3.8 MMOL/L (ref 3.5–5)
RBC # BLD: 5.21 E12/L (ref 3.5–5.5)
SEDIMENTATION RATE, ERYTHROCYTE: 12 MM/HR (ref 0–20)
SODIUM BLD-SCNC: 140 MMOL/L (ref 132–146)
T4 FREE: 1.16 NG/DL (ref 0.93–1.7)
TOTAL PROTEIN: 7.2 G/DL (ref 6.4–8.3)
TRIGL SERPL-MCNC: 89 MG/DL (ref 0–149)
TSH SERPL DL<=0.05 MIU/L-ACNC: 1.43 UIU/ML (ref 0.27–4.2)
VITAMIN D 25-HYDROXY: 40 NG/ML (ref 30–100)
VLDLC SERPL CALC-MCNC: 18 MG/DL
WBC # BLD: 6.6 E9/L (ref 4.5–11.5)

## 2022-11-16 PROCEDURE — 86481 TB AG RESPONSE T-CELL SUSP: CPT

## 2022-11-16 PROCEDURE — 84439 ASSAY OF FREE THYROXINE: CPT

## 2022-11-16 PROCEDURE — 82306 VITAMIN D 25 HYDROXY: CPT

## 2022-11-16 PROCEDURE — 36415 COLL VENOUS BLD VENIPUNCTURE: CPT

## 2022-11-16 PROCEDURE — 86140 C-REACTIVE PROTEIN: CPT

## 2022-11-16 PROCEDURE — 82164 ANGIOTENSIN I ENZYME TEST: CPT

## 2022-11-16 PROCEDURE — 85549 MURAMIDASE: CPT

## 2022-11-16 PROCEDURE — 84443 ASSAY THYROID STIM HORMONE: CPT

## 2022-11-16 PROCEDURE — 80061 LIPID PANEL: CPT

## 2022-11-16 PROCEDURE — 80053 COMPREHEN METABOLIC PANEL: CPT

## 2022-11-16 PROCEDURE — 85025 COMPLETE CBC W/AUTO DIFF WBC: CPT

## 2022-11-16 PROCEDURE — 82248 BILIRUBIN DIRECT: CPT

## 2022-11-16 PROCEDURE — 85651 RBC SED RATE NONAUTOMATED: CPT

## 2022-11-18 LAB — ANGIOTENSIN CONVERTING ENZYME: 50 U/L (ref 16–85)

## 2022-11-20 LAB
COMMENT: NORMAL
LYSOZYME: 3.48 UG/ML
REPORT: NORMAL

## 2022-11-21 ENCOUNTER — TELEPHONE (OUTPATIENT)
Dept: FAMILY MEDICINE CLINIC | Age: 72
End: 2022-11-21

## 2022-11-21 NOTE — TELEPHONE ENCOUNTER
Called the patient to inform her of the lab results that we had received. She then stated that she first thing in the mornings when she blows her nose, \"there are green chunks and a little of blood\". She stated no other symptoms and it's only first thing in the mornings. She takes Flonase in the mornings and that helps the rest of the day. Do you have any other recommendations?

## 2022-11-25 RX ORDER — LEVOTHYROXINE SODIUM 25 MCG
TABLET ORAL
Qty: 90 TABLET | Refills: 1 | Status: SHIPPED | OUTPATIENT
Start: 2022-11-25

## 2022-11-25 RX ORDER — TRIAMTERENE AND HYDROCHLOROTHIAZIDE 37.5; 25 MG/1; MG/1
TABLET ORAL
Qty: 90 TABLET | Refills: 1 | Status: SHIPPED | OUTPATIENT
Start: 2022-11-25

## 2022-11-25 RX ORDER — PANTOPRAZOLE SODIUM 40 MG/1
TABLET, DELAYED RELEASE ORAL
Qty: 90 TABLET | Refills: 1 | Status: SHIPPED | OUTPATIENT
Start: 2022-11-25

## 2022-11-25 RX ORDER — ROSUVASTATIN CALCIUM 10 MG/1
TABLET, COATED ORAL
Qty: 90 TABLET | Refills: 1 | Status: SHIPPED | OUTPATIENT
Start: 2022-11-25

## 2022-11-27 ASSESSMENT — ENCOUNTER SYMPTOMS
ABDOMINAL DISTENTION: 0
ABDOMINAL PAIN: 0
VOMITING: 0
CHEST TIGHTNESS: 0
VOICE CHANGE: 0
CONSTIPATION: 0
DIARRHEA: 0
FACIAL SWELLING: 0
PHOTOPHOBIA: 0
WHEEZING: 0
RHINORRHEA: 1
NAUSEA: 0
SINUS PAIN: 0
COUGH: 0
SHORTNESS OF BREATH: 0
SORE THROAT: 0
BACK PAIN: 0
EYE ITCHING: 1
APNEA: 0
RECTAL PAIN: 0

## 2022-12-07 DIAGNOSIS — H57.89: ICD-10-CM

## 2022-12-12 DIAGNOSIS — N28.89 MASS OF KIDNEY: Primary | ICD-10-CM

## 2022-12-12 NOTE — PROGRESS NOTES
22  Alesha Hsu : 1950 Sex: female  Age: 71 y.o. No chief complaint on file. HPI    Review of Systems      Physical Exam    Assessment and Plan:  There are no diagnoses linked to this encounter. Discussions/Education provided to patients during visit:  [] Discussed the importance to stop smoking. [] Advised to monitor eating habits. [] Reviewed and discussed Imaging results. [] Reviewed and discussed Lab results. [] Discussed the importance of drinking plenty of fluids. [] Cut down on Salt, Caffeine, and Sugar.  [] Non Compliant Patient   [x] Communicated with patient any concerns, to phone offic        No follow-ups on file.       Seen by:     Stephanie Pina DO

## 2022-12-19 ENCOUNTER — OFFICE VISIT (OUTPATIENT)
Dept: FAMILY MEDICINE CLINIC | Age: 72
End: 2022-12-19
Payer: MEDICARE

## 2022-12-19 VITALS
DIASTOLIC BLOOD PRESSURE: 84 MMHG | HEIGHT: 61 IN | WEIGHT: 164.4 LBS | RESPIRATION RATE: 17 BRPM | BODY MASS INDEX: 31.04 KG/M2 | HEART RATE: 62 BPM | SYSTOLIC BLOOD PRESSURE: 138 MMHG | TEMPERATURE: 98.7 F | OXYGEN SATURATION: 98 %

## 2022-12-19 DIAGNOSIS — J06.9 VIRAL URI: ICD-10-CM

## 2022-12-19 DIAGNOSIS — H60.91 OTITIS EXTERNA OF RIGHT EAR, UNSPECIFIED CHRONICITY, UNSPECIFIED TYPE: Primary | ICD-10-CM

## 2022-12-19 DIAGNOSIS — N28.89 MASS OF KIDNEY: ICD-10-CM

## 2022-12-19 DIAGNOSIS — H57.89: ICD-10-CM

## 2022-12-19 DIAGNOSIS — E03.9 HYPOTHYROIDISM, UNSPECIFIED TYPE: ICD-10-CM

## 2022-12-19 DIAGNOSIS — K21.9 GASTROESOPHAGEAL REFLUX DISEASE WITHOUT ESOPHAGITIS: ICD-10-CM

## 2022-12-19 DIAGNOSIS — E78.5 HYPERLIPIDEMIA, UNSPECIFIED HYPERLIPIDEMIA TYPE: ICD-10-CM

## 2022-12-19 PROCEDURE — 1123F ACP DISCUSS/DSCN MKR DOCD: CPT | Performed by: FAMILY MEDICINE

## 2022-12-19 PROCEDURE — 3078F DIAST BP <80 MM HG: CPT | Performed by: FAMILY MEDICINE

## 2022-12-19 PROCEDURE — 99213 OFFICE O/P EST LOW 20 MIN: CPT | Performed by: FAMILY MEDICINE

## 2022-12-19 PROCEDURE — 3074F SYST BP LT 130 MM HG: CPT | Performed by: FAMILY MEDICINE

## 2022-12-19 RX ORDER — AMOXICILLIN AND CLAVULANATE POTASSIUM 875; 125 MG/1; MG/1
1 TABLET, FILM COATED ORAL 2 TIMES DAILY
Qty: 20 TABLET | Refills: 0 | Status: SHIPPED | OUTPATIENT
Start: 2022-12-19 | End: 2022-12-29

## 2022-12-19 NOTE — PROGRESS NOTES
22  Emili Record : 1950 Sex: female  Age: 71 y.o. Chief Complaint   Patient presents with    Results    Otalgia     No other symptoms       HPI  follow up all tests   Ear pain   eMERGENCY dEPARTMENT eNCOUnter        Lydia Bergeron    Chief Complaint   Patient presents with    Results    Otalgia     No other symptoms       HPI    Emili Record is a 71 y.o. female who presents  Is ear pain and an upper respiratory infection    REVIEW OF SYSTEMS   See HPI for further details. Review of systems otherwise negative. PAST MEDICAL HISTORY    Past Medical History:   Diagnosis Date    AC (acromioclavicular) arthritis     right shoulder    Anxiety     Diverticulitis     DJD (degenerative joint disease)     Esophagitis     GERD (gastroesophageal reflux disease)     Headache     Hiatal hernia     Hyperlipidemia     Hypertension     Hypothyroid     Hypothyroid     IBS (irritable bowel syndrome)     Impingement syndrome of right shoulder     right shoulder    PONV (postoperative nausea and vomiting)     Pre-operative clearance     medical and neuro for OR 2019. SDH (subdural hematoma)     h/o  follows with Dr Jaskaran Dubois    Past Surgical History:   Procedure Laterality Date    ABDOMEN SURGERY      ABDOMINOPLASTY      BRAIN AVM REPAIR Left     BREAST REDUCTION SURGERY      BUNIONECTOMY Bilateral     COLONOSCOPY      COSMETIC SURGERY      FACELIFT    FRACTURE SURGERY      OVARY REMOVAL Bilateral     RHINOPLASTY      SHOULDER ARTHROSCOPY Right 2015    SA decomp w/Michelle resect of dist clavicle/Inj for post-op pain HARISH Cotter MD    SHOULDER ARTHROSCOPY Right 2019    RIGHT SHOULDER ARTHROSCOPY ROTATOR CUFF REPAIR, SUBSCAPULAR REPAIR, BICEPS TENODESIS + ISB performed by Ld James MD at [address] [place] [address]         CURRENT MEDICATIONS    Current Outpatient Rx   Medication Sig Dispense Refill triamterene-hydroCHLOROthiazide (MAXZIDE-25) 37.5-25 MG per tablet Take 1 tablet by mouth once daily 90 tablet 1    SYNTHROID 25 MCG tablet Take 1 tablet by mouth once daily 90 tablet 1    rosuvastatin (CRESTOR) 10 MG tablet Take 1 tablet by mouth once daily 90 tablet 1    pantoprazole (PROTONIX) 40 MG tablet Take 1 tablet by mouth once daily 90 tablet 1    ALPRAZolam (XANAX) 1 MG tablet       sertraline (ZOLOFT) 25 MG tablet Take 1 tablet by mouth daily 30 tablet 5    Estradiol-Norethindrone Acet (ACTIVELLA) 0.5-0.1 MG TABS Take 1 tablet by mouth daily 90 tablet 1    amLODIPine (NORVASC) 5 MG tablet Take 1 tablet by mouth once daily 90 tablet 1    vitamin k 100 MCG tablet Take 100 mcg by mouth daily      fluticasone (FLONASE) 50 MCG/ACT nasal spray 2 sprays by Each Nostril route daily 16 g 5    Handicap Placard MISC Permanent  Disability   5 years 1 each 0    Zinc 100 MG TABS Take 1 tablet by mouth daily      Probiotic Product (PROBIOTIC DAILY PO) Take 1 tablet by mouth daily      butalbital-acetaminophen-caffeine (FIORICET, ESGIC) -40 MG per tablet Take 2 tablets by mouth every 4 hours as needed for Headaches 180 tablet 3    b complex vitamins capsule Take 1 capsule by mouth daily Ld 1/28/2019      vitamin D3 (CHOLECALCIFEROL) 400 units TABS tablet Take 400 Units by mouth daily Ld 1/28/2019      hyoscyamine (ANASPAZ;LEVSIN) 0.125 MG tablet Take 0.125 mg by mouth every 8 hours as needed for Cramping      multivitamin (THERAGRAN) per tablet Take 1 tablet by mouth daily Ld 1/28/2019      levoFLOXacin (LEVAQUIN) 500 MG tablet Take 1 tablet by mouth daily for 10 days 10 tablet 0    Homeopathic Products (ARNICA EX) Apply topically (Patient not taking: Reported on 12/19/2022)      Homeopathic Products (ARNICARE ARTHRITIS PO) Take by mouth (Patient not taking: Reported on 12/19/2022)         ALLERGIES    Allergies   Allergen Reactions    Albumin (Human) Hives    Bee Venom Hives    Influenza Virus Vaccine Swelling SEVERE EDEMA    Eggs Or Egg-Derived Products [Eggs Or Egg-Derived Products] Nausea And Vomiting and Swelling    Norco [Hydrocodone-Acetaminophen] Hives and Itching    Theophyllines Nausea And Vomiting       FAMILY HISTORY    Family History   Problem Relation Age of Onset    Prostate Cancer Father     Breast Cancer Sister        SOCIAL HISTORY    Social History     Socioeconomic History    Marital status:    Tobacco Use    Smoking status: Never    Smokeless tobacco: Never   Vaping Use    Vaping Use: Never used   Substance and Sexual Activity    Alcohol use: Yes     Comment: Occassional    Drug use: No    Sexual activity: Yes     Partners: Male   Social History Narrative    ** Merged History Encounter **          Social Determinants of Health     Financial Resource Strain: Low Risk     Difficulty of Paying Living Expenses: Not hard at all   Food Insecurity: No Food Insecurity    Worried About Running Out of Food in the Last Year: Never true    Ran Out of Food in the Last Year: Never true   Physical Activity: Insufficiently Active    Days of Exercise per Week: 3 days    Minutes of Exercise per Session: 30 min       PHYSICAL EXAM    VITAL SIGNS: /84 (Site: Left Upper Arm, Position: Sitting, Cuff Size: Large Adult)   Pulse 62   Temp 98.7 °F (37.1 °C) (Temporal)   Resp 17   Ht 5' 1\" (1.549 m)   Wt 164 lb 6.4 oz (74.6 kg)   SpO2 98%   BMI 31.06 kg/m²   Constitutional:  Well developed, well nourished, no acute distress, non-toxic appearance   Eyes: PERRL, conjunctiva normal   HENT: r ear tm inflammat  Respiratory:  ronci    Cardiovascular:  Normal rate, normal rhythm, no murmurs, no gallops, no rubs   Musculoskeletal:  No edema   Integument:  Well hydrated, no rash     RADIOLOGY/PROCEDURES    no    ED COURSE & MEDICAL DECISION MAKING    Pertinent Labs & Imaging studies reviewed. (See chart for details)  None     FINAL IMPRESSION    1. Otitis media    2.   Respiratory infection        Review of Systems Constitutional:  Negative for appetite change, chills, diaphoresis, fatigue, fever and unexpected weight change. HENT:  Positive for rhinorrhea and sinus pain. Negative for congestion, dental problem, ear discharge, ear pain, facial swelling, hearing loss, mouth sores, sore throat, tinnitus and voice change. Eyes:  Negative for photophobia and visual disturbance (no visual changes). Respiratory:  Negative for apnea, cough, chest tightness, shortness of breath and wheezing. Cardiovascular:  Negative for chest pain and palpitations. Gastrointestinal:  Positive for abdominal pain (very occasional ,no hematechesis). Negative for abdominal distention, constipation, diarrhea, nausea, rectal pain and vomiting. Endocrine: Negative for cold intolerance, heat intolerance and polyuria. Genitourinary:  Negative for dysuria, frequency, hematuria and urgency. No hematuria   Musculoskeletal:  Negative for arthralgias, back pain, gait problem, joint swelling, myalgias, neck pain and neck stiffness. Skin:  Negative for rash and wound. Allergic/Immunologic: Negative for environmental allergies. Neurological:  Negative for dizziness, tremors, seizures, syncope, facial asymmetry, speech difficulty, weakness, light-headedness, numbness and headaches. Hematological:  Does not bruise/bleed easily. Psychiatric/Behavioral:  Negative for agitation, hallucinations and suicidal ideas. The patient is not nervous/anxious. Physical Exam  Vitals reviewed. Constitutional:       General: She is not in acute distress. Appearance: She is ill-appearing. HENT:      Head: Normocephalic. Right Ear: Tympanic membrane, ear canal and external ear normal. There is no impacted cerumen. Left Ear: Tympanic membrane, ear canal and external ear normal. There is no impacted cerumen. Ears:      Comments: TM inflammation     Nose: No rhinorrhea.       Mouth/Throat:      Pharynx: No posterior oropharyngeal erythema. Eyes:      General:         Right eye: No discharge. Left eye: No discharge. Extraocular Movements: Extraocular movements intact. Pupils: Pupils are equal, round, and reactive to light. Neck:      Vascular: No carotid bruit. Cardiovascular:      Rate and Rhythm: Regular rhythm. Heart sounds: Normal heart sounds. No murmur heard. No gallop. Pulmonary:      Breath sounds: No wheezing or rales. Abdominal:      Palpations: Abdomen is soft. There is no mass. Tenderness: There is no abdominal tenderness. There is no guarding or rebound. Hernia: No hernia is present. Musculoskeletal:         General: Normal range of motion. Cervical back: Neck supple. Lymphadenopathy:      Cervical: No cervical adenopathy. Skin:     General: Skin is warm and dry. Findings: No bruising or rash. Neurological:      General: No focal deficit present. Mental Status: She is alert and oriented to person, place, and time. Cranial Nerves: No cranial nerve deficit. Motor: No weakness. Coordination: Coordination normal.      Gait: Gait normal.   Psychiatric:         Mood and Affect: Mood normal.         Behavior: Behavior normal.       Assessment and Plan:  Royal oak was seen today for results and otalgia. Diagnoses and all orders for this visit:    Otitis externa of right ear, unspecified chronicity, unspecified type    Mass of kidney    Hypothyroidism, unspecified type    Gastroesophageal reflux disease without esophagitis    Hyperlipidemia, unspecified hyperlipidemia type    Mass of eye    Viral URI    Other orders  -     amoxicillin-clavulanate (AUGMENTIN) 875-125 MG per tablet; Take 1 tablet by mouth 2 times daily for 10 days        Discussions/Education provided to patients during visit:  [] Discussed the importance to stop smoking. [] Advised to monitor eating habits. [] Reviewed and discussed Imaging results.   [] Reviewed and discussed Lab results. [] Discussed the importance of drinking plenty of fluids. [] Cut down on Salt, Caffeine, and Sugar.  [] Non Compliant Patient   [x] Communicated with patient any concerns, to phone office. Augmentin 875 bid x 10 days ear infection    Up if not better it appears that this Augmentin will probably do well for      No follow-ups on file.       Seen by:     Hayley Win, DO

## 2022-12-30 ENCOUNTER — TELEPHONE (OUTPATIENT)
Dept: FAMILY MEDICINE CLINIC | Age: 72
End: 2022-12-30

## 2022-12-30 RX ORDER — LEVOFLOXACIN 500 MG/1
500 TABLET, FILM COATED ORAL DAILY
Qty: 10 TABLET | Refills: 0 | Status: SHIPPED | OUTPATIENT
Start: 2022-12-30 | End: 2023-01-09

## 2022-12-30 ASSESSMENT — ENCOUNTER SYMPTOMS
WHEEZING: 0
DIARRHEA: 0
CHEST TIGHTNESS: 0
RHINORRHEA: 1
SINUS PAIN: 1
BACK PAIN: 0
FACIAL SWELLING: 0
SORE THROAT: 0
SHORTNESS OF BREATH: 0
ABDOMINAL PAIN: 1
CONSTIPATION: 0
APNEA: 0
PHOTOPHOBIA: 0
VOICE CHANGE: 0
RECTAL PAIN: 0
ABDOMINAL DISTENTION: 0
VOMITING: 0
COUGH: 0
NAUSEA: 0

## 2022-12-30 NOTE — TELEPHONE ENCOUNTER
Was seeb 12/19/22 given Amoxicillin, she feels even worse now. Would Dr. Hossein Hernández send in somtething else;  ear hursts, nose drippy, feels like crap. Pharmacy is [facility] [place] 6 [address]eriges.

## 2023-01-06 ENCOUNTER — OFFICE VISIT (OUTPATIENT)
Dept: FAMILY MEDICINE CLINIC | Age: 73
End: 2023-01-06
Payer: MEDICARE

## 2023-01-06 VITALS
WEIGHT: 163.8 LBS | SYSTOLIC BLOOD PRESSURE: 122 MMHG | BODY MASS INDEX: 30.93 KG/M2 | HEART RATE: 65 BPM | HEIGHT: 61 IN | TEMPERATURE: 100 F | DIASTOLIC BLOOD PRESSURE: 84 MMHG | OXYGEN SATURATION: 99 % | RESPIRATION RATE: 17 BRPM

## 2023-01-06 DIAGNOSIS — R51.9 ACUTE NONINTRACTABLE HEADACHE, UNSPECIFIED HEADACHE TYPE: ICD-10-CM

## 2023-01-06 DIAGNOSIS — R74.8 ELEVATED LIVER ENZYMES: Primary | ICD-10-CM

## 2023-01-06 DIAGNOSIS — R09.81 SINUS CONGESTION: ICD-10-CM

## 2023-01-06 LAB
INFLUENZA A ANTIBODY: NORMAL
INFLUENZA B ANTIBODY: NORMAL

## 2023-01-06 PROCEDURE — 3074F SYST BP LT 130 MM HG: CPT | Performed by: FAMILY MEDICINE

## 2023-01-06 PROCEDURE — 87804 INFLUENZA ASSAY W/OPTIC: CPT | Performed by: FAMILY MEDICINE

## 2023-01-06 PROCEDURE — 3078F DIAST BP <80 MM HG: CPT | Performed by: FAMILY MEDICINE

## 2023-01-06 PROCEDURE — 99213 OFFICE O/P EST LOW 20 MIN: CPT | Performed by: FAMILY MEDICINE

## 2023-01-06 PROCEDURE — 1123F ACP DISCUSS/DSCN MKR DOCD: CPT | Performed by: FAMILY MEDICINE

## 2023-01-06 RX ORDER — AZITHROMYCIN 250 MG/1
250 TABLET, FILM COATED ORAL SEE ADMIN INSTRUCTIONS
Qty: 6 TABLET | Refills: 1 | Status: SHIPPED | OUTPATIENT
Start: 2023-01-06 | End: 2023-01-11

## 2023-01-06 RX ORDER — LEVOFLOXACIN 500 MG/1
500 TABLET, FILM COATED ORAL DAILY
Qty: 10 TABLET | Refills: 0 | Status: SHIPPED | OUTPATIENT
Start: 2023-01-06 | End: 2023-01-16

## 2023-01-06 ASSESSMENT — PATIENT HEALTH QUESTIONNAIRE - PHQ9
2. FEELING DOWN, DEPRESSED OR HOPELESS: 1
SUM OF ALL RESPONSES TO PHQ QUESTIONS 1-9: 1
1. LITTLE INTEREST OR PLEASURE IN DOING THINGS: 0
SUM OF ALL RESPONSES TO PHQ9 QUESTIONS 1 & 2: 1

## 2023-01-06 NOTE — PROGRESS NOTES
23  Truddie Angles : 1950 Sex: female  Age: 71 y.o. Chief Complaint   Patient presents with    Congestion     Sinus congestion, headaches, cough, bilateral calf pain, has 2 days left of Levaquin    Other     Has appointment Dr. Ferny De Guzman on , had a kidney ultrasound done in December at [address]       HPI  CONTIUEING URI    Review of Systems   Constitutional:  Negative for appetite change, chills, diaphoresis, fatigue, fever and unexpected weight change. HENT:  Positive for rhinorrhea and sinus pain. Negative for congestion, dental problem, ear discharge, ear pain, facial swelling, hearing loss, mouth sores, sore throat, tinnitus and voice change. CONGESTION EAR PAIN SINUS  LEFT NOW COUGH AND SINUS PAIN    Eyes:  Negative for photophobia and visual disturbance (no visual changes). Respiratory:  Negative for apnea, cough, chest tightness, shortness of breath and wheezing. Cardiovascular:  Negative for chest pain and palpitations. Gastrointestinal:  Positive for abdominal pain (very occasional ,no hematechesis). Negative for abdominal distention, constipation, diarrhea, nausea, rectal pain and vomiting. Endocrine: Negative for cold intolerance, heat intolerance and polyuria. Genitourinary:  Negative for dysuria, frequency, hematuria and urgency. No hematuria   Musculoskeletal:  Negative for arthralgias, back pain, gait problem, joint swelling, myalgias, neck pain and neck stiffness. Skin:  Negative for rash and wound. Allergic/Immunologic: Negative for environmental allergies. Neurological:  Negative for dizziness, tremors, seizures, syncope, facial asymmetry, speech difficulty, weakness, light-headedness, numbness and headaches. Hematological:  Does not bruise/bleed easily. Psychiatric/Behavioral:  Negative for agitation, hallucinations and suicidal ideas. The patient is not nervous/anxious. Physical Exam  Vitals reviewed.    Constitutional: General: She is not in acute distress. Appearance: She is ill-appearing. HENT:      Head: Normocephalic. Right Ear: Tympanic membrane, ear canal and external ear normal. There is no impacted cerumen. Left Ear: Tympanic membrane, ear canal and external ear normal. There is no impacted cerumen. Ears:      Comments: TM inflammation  left      Nose: No rhinorrhea. Mouth/Throat:      Pharynx: No posterior oropharyngeal erythema. Eyes:      General:         Right eye: No discharge. Left eye: No discharge. Extraocular Movements: Extraocular movements intact. Pupils: Pupils are equal, round, and reactive to light. Neck:      Vascular: No carotid bruit. Cardiovascular:      Rate and Rhythm: Regular rhythm. Heart sounds: Normal heart sounds. No murmur heard. No gallop. Pulmonary:      Breath sounds: No wheezing or rales. Abdominal:      Palpations: Abdomen is soft. There is no mass. Tenderness: There is no abdominal tenderness. There is no guarding or rebound. Hernia: No hernia is present. Musculoskeletal:         General: Normal range of motion. Cervical back: Neck supple. Lymphadenopathy:      Cervical: No cervical adenopathy. Skin:     General: Skin is warm and dry. Findings: No bruising or rash. Neurological:      General: No focal deficit present. Mental Status: She is alert and oriented to person, place, and time. Cranial Nerves: No cranial nerve deficit. Motor: No weakness. Coordination: Coordination normal.      Gait: Gait normal.   Psychiatric:         Mood and Affect: Mood normal.         Behavior: Behavior normal.       Assessment and Plan:  Casimiro Lawson was seen today for congestion and other. Diagnoses and all orders for this visit:    Elevated liver enzymes  -     Hepatic Function Panel; Future  -     Comprehensive Metabolic Panel;  Future    Sinus congestion  -     POCT Influenza A/B  -     azithromycin (ZITHROMAX) 250 MG tablet; Take 1 tablet by mouth See Admin Instructions for 5 days 500mg on day 1 followed by 250mg on days 2 - 5  -     CT HEAD WO CONTRAST; Future    Acute nonintractable headache, unspecified headache type  -     CT HEAD WO CONTRAST; Future    Other orders  -     levoFLOXacin (LEVAQUIN) 500 MG tablet; Take 1 tablet by mouth daily for 10 days        Discussions/Education provided to patients during visit:  [] Discussed the importance to stop smoking. [] Advised to monitor eating habits. [x] Reviewed and discussed Imaging results. [x] Reviewed and discussed Lab results. [x] Discussed the importance of drinking plenty of fluids. [x] Cut down on Salt, Caffeine, and Sugar.  [] Non Compliant Patient   [x] Communicated with patient any concerns, to phone office. BLOOD WORK ORDERED    DC DARREN TENDON pain       No follow-ups on file.       Seen by:     Carlene Yates DO

## 2023-01-16 ENCOUNTER — TELEPHONE (OUTPATIENT)
Dept: FAMILY MEDICINE CLINIC | Age: 73
End: 2023-01-16

## 2023-01-16 NOTE — TELEPHONE ENCOUNTER
You told her to get back in with Dr. Herson Florence so she called them, and because of how long it has been since she was there, she is considered a new patient and has to wait until March to see him. Is this ok with you? She said she is fine with it, just wanted your thoughts. They told her she can also call in periodically to see if there are any cancellations.

## 2023-01-23 ENCOUNTER — NURSE ONLY (OUTPATIENT)
Dept: FAMILY MEDICINE CLINIC | Age: 73
End: 2023-01-23
Payer: MEDICARE

## 2023-01-23 DIAGNOSIS — R74.8 ELEVATED LIVER ENZYMES: ICD-10-CM

## 2023-01-23 DIAGNOSIS — I10 ESSENTIAL HYPERTENSION: Primary | ICD-10-CM

## 2023-01-23 LAB
ALBUMIN SERPL-MCNC: 4.2 G/DL (ref 3.5–5.2)
ALP BLD-CCNC: 85 U/L (ref 35–104)
ALT SERPL-CCNC: 32 U/L (ref 0–32)
ANION GAP SERPL CALCULATED.3IONS-SCNC: 11 MMOL/L (ref 7–16)
AST SERPL-CCNC: 34 U/L (ref 0–31)
BILIRUB SERPL-MCNC: 0.5 MG/DL (ref 0–1.2)
BILIRUBIN DIRECT: <0.2 MG/DL (ref 0–0.3)
BILIRUBIN, INDIRECT: NORMAL MG/DL (ref 0–1)
BUN BLDV-MCNC: 14 MG/DL (ref 6–23)
CALCIUM SERPL-MCNC: 9.5 MG/DL (ref 8.6–10.2)
CHLORIDE BLD-SCNC: 106 MMOL/L (ref 98–107)
CO2: 25 MMOL/L (ref 22–29)
CREAT SERPL-MCNC: 1 MG/DL (ref 0.5–1)
GFR SERPL CREATININE-BSD FRML MDRD: >60 ML/MIN/1.73
GLUCOSE BLD-MCNC: 91 MG/DL (ref 74–99)
POTASSIUM SERPL-SCNC: 3.8 MMOL/L (ref 3.5–5)
SODIUM BLD-SCNC: 142 MMOL/L (ref 132–146)
TOTAL PROTEIN: 6.9 G/DL (ref 6.4–8.3)

## 2023-01-23 PROCEDURE — 99999 PR OFFICE/OUTPT VISIT,PROCEDURE ONLY: CPT | Performed by: FAMILY MEDICINE

## 2023-01-23 PROCEDURE — 36415 COLL VENOUS BLD VENIPUNCTURE: CPT | Performed by: FAMILY MEDICINE

## 2023-01-25 DIAGNOSIS — R51.9 ACUTE NONINTRACTABLE HEADACHE, UNSPECIFIED HEADACHE TYPE: ICD-10-CM

## 2023-01-25 DIAGNOSIS — R09.81 SINUS CONGESTION: ICD-10-CM

## 2023-01-31 ASSESSMENT — ENCOUNTER SYMPTOMS
NAUSEA: 0
RECTAL PAIN: 0
RHINORRHEA: 1
VOMITING: 0
COUGH: 0
WHEEZING: 0
ABDOMINAL PAIN: 1
APNEA: 0
FACIAL SWELLING: 0
ABDOMINAL DISTENTION: 0
CHEST TIGHTNESS: 0
SORE THROAT: 0
CONSTIPATION: 0
VOICE CHANGE: 0
SINUS PAIN: 1
PHOTOPHOBIA: 0
BACK PAIN: 0
DIARRHEA: 0
SHORTNESS OF BREATH: 0

## 2023-02-01 RX ORDER — AMLODIPINE BESYLATE 5 MG/1
TABLET ORAL
Qty: 90 TABLET | Refills: 1 | Status: SHIPPED | OUTPATIENT
Start: 2023-02-01

## 2023-02-03 ENCOUNTER — OFFICE VISIT (OUTPATIENT)
Dept: FAMILY MEDICINE CLINIC | Age: 73
End: 2023-02-03
Payer: MEDICARE

## 2023-02-03 VITALS
SYSTOLIC BLOOD PRESSURE: 124 MMHG | DIASTOLIC BLOOD PRESSURE: 82 MMHG | TEMPERATURE: 98.8 F | RESPIRATION RATE: 17 BRPM | OXYGEN SATURATION: 96 % | BODY MASS INDEX: 32.13 KG/M2 | HEART RATE: 52 BPM | HEIGHT: 61 IN | WEIGHT: 170.2 LBS

## 2023-02-03 DIAGNOSIS — E03.9 HYPOTHYROIDISM, UNSPECIFIED TYPE: ICD-10-CM

## 2023-02-03 DIAGNOSIS — K58.9 IRRITABLE BOWEL SYNDROME WITHOUT DIARRHEA: ICD-10-CM

## 2023-02-03 DIAGNOSIS — E55.9 VITAMIN D DEFICIENCY: ICD-10-CM

## 2023-02-03 DIAGNOSIS — E78.5 HYPERLIPIDEMIA, UNSPECIFIED HYPERLIPIDEMIA TYPE: ICD-10-CM

## 2023-02-03 DIAGNOSIS — E78.2 MIXED HYPERLIPIDEMIA: ICD-10-CM

## 2023-02-03 DIAGNOSIS — R51.9 FACIAL PAIN: ICD-10-CM

## 2023-02-03 DIAGNOSIS — R73.01 IFG (IMPAIRED FASTING GLUCOSE): ICD-10-CM

## 2023-02-03 DIAGNOSIS — I10 ESSENTIAL HYPERTENSION, MALIGNANT: ICD-10-CM

## 2023-02-03 DIAGNOSIS — R09.81 SINUS CONGESTION: Primary | ICD-10-CM

## 2023-02-03 DIAGNOSIS — K21.9 GASTROESOPHAGEAL REFLUX DISEASE WITHOUT ESOPHAGITIS: ICD-10-CM

## 2023-02-03 PROCEDURE — 1123F ACP DISCUSS/DSCN MKR DOCD: CPT | Performed by: FAMILY MEDICINE

## 2023-02-03 PROCEDURE — 3078F DIAST BP <80 MM HG: CPT | Performed by: FAMILY MEDICINE

## 2023-02-03 PROCEDURE — 3074F SYST BP LT 130 MM HG: CPT | Performed by: FAMILY MEDICINE

## 2023-02-03 PROCEDURE — 99213 OFFICE O/P EST LOW 20 MIN: CPT | Performed by: FAMILY MEDICINE

## 2023-02-03 RX ORDER — CLINDAMYCIN HYDROCHLORIDE 300 MG/1
CAPSULE ORAL
COMMUNITY
Start: 2023-02-01

## 2023-02-03 SDOH — ECONOMIC STABILITY: INCOME INSECURITY: HOW HARD IS IT FOR YOU TO PAY FOR THE VERY BASICS LIKE FOOD, HOUSING, MEDICAL CARE, AND HEATING?: NOT HARD AT ALL

## 2023-02-03 SDOH — ECONOMIC STABILITY: FOOD INSECURITY: WITHIN THE PAST 12 MONTHS, YOU WORRIED THAT YOUR FOOD WOULD RUN OUT BEFORE YOU GOT MONEY TO BUY MORE.: NEVER TRUE

## 2023-02-03 SDOH — ECONOMIC STABILITY: HOUSING INSECURITY
IN THE LAST 12 MONTHS, WAS THERE A TIME WHEN YOU DID NOT HAVE A STEADY PLACE TO SLEEP OR SLEPT IN A SHELTER (INCLUDING NOW)?: NO

## 2023-02-03 SDOH — ECONOMIC STABILITY: FOOD INSECURITY: WITHIN THE PAST 12 MONTHS, THE FOOD YOU BOUGHT JUST DIDN'T LAST AND YOU DIDN'T HAVE MONEY TO GET MORE.: NEVER TRUE

## 2023-02-03 ASSESSMENT — ENCOUNTER SYMPTOMS
SINUS PAIN: 1
SINUS PRESSURE: 1
EYE REDNESS: 1

## 2023-02-03 NOTE — PROGRESS NOTES
2/3/23  Aurelia Nicolas : 1950 Sex: female  Age: 71 y.o. Chief Complaint   Patient presents with    Discuss Labs     3 month follow up, saw ENT on Monday--given Clindamycin, broken blood vessel in left eye--saw eye doctor yesterday       Michael Vyas has had sinusitis was referred to Dr Coni Ramos for ent consult       Review of Systems   Constitutional:  Negative for appetite change, chills, diaphoresis, fatigue, fever and unexpected weight change. HENT:  Positive for rhinorrhea, sinus pressure (continuing and now seeing Lance) and sinus pain. Negative for congestion, dental problem, ear discharge, ear pain, facial swelling, hearing loss, mouth sores, sore throat, tinnitus and voice change. CONGESTION EAR PAIN SINUS  LEFT NOW COUGH AND SINUS PAIN    Eyes:  Positive for redness (had eye hemmoage and dxed as dry eye). Negative for photophobia and visual disturbance (no visual changes). Respiratory:  Negative for apnea, cough, chest tightness, shortness of breath and wheezing. Cardiovascular:  Negative for chest pain and palpitations. Gastrointestinal:  Positive for abdominal pain (very occasional ,no hematechesis). Negative for abdominal distention, constipation, diarrhea, nausea, rectal pain and vomiting. Endocrine: Negative for cold intolerance, heat intolerance and polyuria. Genitourinary:  Negative for dysuria, frequency, hematuria and urgency. No hematuria   Musculoskeletal:  Negative for arthralgias, back pain, gait problem, joint swelling, myalgias, neck pain and neck stiffness. Skin:  Negative for rash and wound. Allergic/Immunologic: Negative for environmental allergies. Neurological:  Negative for dizziness, tremors, seizures, syncope, facial asymmetry, speech difficulty, weakness, light-headedness, numbness and headaches. Hematological:  Does not bruise/bleed easily. Psychiatric/Behavioral:  Negative for agitation, hallucinations and suicidal ideas.  The patient is not nervous/anxious. Physical Exam  Vitals reviewed. Constitutional:       General: She is not in acute distress. Appearance: She is ill-appearing. HENT:      Head: Normocephalic. Right Ear: Tympanic membrane, ear canal and external ear normal. There is no impacted cerumen. Left Ear: Tympanic membrane, ear canal and external ear normal. There is no impacted cerumen. Ears:      Comments: TM inflammation  left  decreased     Sinus pressure and tenderness at sinuses      Nose: Congestion and rhinorrhea present. Mouth/Throat:      Pharynx: No posterior oropharyngeal erythema. Eyes:      General:         Right eye: No discharge. Left eye: No discharge. Extraocular Movements: Extraocular movements intact. Pupils: Pupils are equal, round, and reactive to light. Neck:      Vascular: No carotid bruit. Cardiovascular:      Rate and Rhythm: Regular rhythm. Heart sounds: Normal heart sounds. No murmur heard. No gallop. Pulmonary:      Breath sounds: No wheezing or rales. Abdominal:      Palpations: Abdomen is soft. There is no mass. Tenderness: There is no abdominal tenderness. There is no guarding or rebound. Hernia: No hernia is present. Musculoskeletal:         General: Normal range of motion. Cervical back: Neck supple. Lymphadenopathy:      Cervical: No cervical adenopathy. Skin:     General: Skin is warm and dry. Findings: No bruising or rash. Neurological:      General: No focal deficit present. Mental Status: She is alert and oriented to person, place, and time. Cranial Nerves: No cranial nerve deficit. Motor: No weakness. Coordination: Coordination normal.      Gait: Gait normal.   Psychiatric:         Mood and Affect: Mood normal.         Behavior: Behavior normal.       Assessment and Plan:  Brittany Mcwilliams was seen today for discuss labs.     Diagnoses and all orders for this visit:    Sinus congestion    Hypothyroidism, unspecified type    IFG (impaired fasting glucose)    Essential hypertension, malignant    Gastroesophageal reflux disease without esophagitis    Mixed hyperlipidemia    Facial pain    Hyperlipidemia, unspecified hyperlipidemia type    Irritable bowel syndrome without diarrhea        Discussions/Education provided to patients during visit:  [] Discussed the importance to stop smoking. [] Advised to monitor eating habits. [] Reviewed and discussed Imaging results. [] Reviewed and discussed Lab results. [x] Discussed the importance of drinking plenty of fluids. [x] Cut down on Salt, Caffeine, and Sugar.  [] Non Compliant Patient   [x] Communicated with patient any concerns, to phone office. April labs   written she is on Cleocin for the sinuses we will see how that goes  Following with Dr. Guerline Angeles April            Return in about 2 months (around 4/3/2023).       Seen by:     Wilman Sanchez DO

## 2023-02-24 ENCOUNTER — COMMUNITY OUTREACH (OUTPATIENT)
Dept: FAMILY MEDICINE CLINIC | Age: 73
End: 2023-02-24

## 2023-02-24 NOTE — PROGRESS NOTES
Patient's HM shows they are overdue for Mammogram.  Care Everywhere and  files searched. No results to attach to order nor HM updated. Mammogram ordered 11/7/2022, referral note said pt wanted order sent to [facility]. Lmom asking for Mammogram to be faxed.

## 2023-02-28 ASSESSMENT — ENCOUNTER SYMPTOMS
CONSTIPATION: 0
NAUSEA: 0
DIARRHEA: 0
PHOTOPHOBIA: 0
RHINORRHEA: 1
VOMITING: 0
VOICE CHANGE: 0
CHEST TIGHTNESS: 0
ABDOMINAL DISTENTION: 0
WHEEZING: 0
SHORTNESS OF BREATH: 0
SORE THROAT: 0
COUGH: 0
APNEA: 0
BACK PAIN: 0
ABDOMINAL PAIN: 1
FACIAL SWELLING: 0
RECTAL PAIN: 0

## 2023-03-07 DIAGNOSIS — R09.81 SINUS CONGESTION: Primary | ICD-10-CM

## 2023-03-07 DIAGNOSIS — J01.91 ACUTE RECURRENT SINUSITIS, UNSPECIFIED LOCATION: ICD-10-CM

## 2023-04-20 ENCOUNTER — NURSE ONLY (OUTPATIENT)
Dept: FAMILY MEDICINE CLINIC | Age: 73
End: 2023-04-20
Payer: MEDICARE

## 2023-04-20 DIAGNOSIS — I10 ESSENTIAL HYPERTENSION, MALIGNANT: ICD-10-CM

## 2023-04-20 DIAGNOSIS — E78.5 HYPERLIPIDEMIA, UNSPECIFIED HYPERLIPIDEMIA TYPE: ICD-10-CM

## 2023-04-20 DIAGNOSIS — K58.9 IRRITABLE BOWEL SYNDROME WITHOUT DIARRHEA: ICD-10-CM

## 2023-04-20 DIAGNOSIS — E55.9 VITAMIN D DEFICIENCY: ICD-10-CM

## 2023-04-20 DIAGNOSIS — R73.01 IFG (IMPAIRED FASTING GLUCOSE): ICD-10-CM

## 2023-04-20 DIAGNOSIS — E78.2 MIXED HYPERLIPIDEMIA: ICD-10-CM

## 2023-04-20 DIAGNOSIS — R51.9 FACIAL PAIN: ICD-10-CM

## 2023-04-20 DIAGNOSIS — E03.9 HYPOTHYROIDISM, UNSPECIFIED TYPE: ICD-10-CM

## 2023-04-20 DIAGNOSIS — I10 ESSENTIAL HYPERTENSION: Primary | ICD-10-CM

## 2023-04-20 DIAGNOSIS — K21.9 GASTROESOPHAGEAL REFLUX DISEASE WITHOUT ESOPHAGITIS: ICD-10-CM

## 2023-04-20 DIAGNOSIS — R09.81 SINUS CONGESTION: ICD-10-CM

## 2023-04-20 LAB
ALBUMIN SERPL-MCNC: 4.5 G/DL (ref 3.5–5.2)
ALP SERPL-CCNC: 96 U/L (ref 35–104)
ALT SERPL-CCNC: 34 U/L (ref 0–32)
ANION GAP SERPL CALCULATED.3IONS-SCNC: 16 MMOL/L (ref 7–16)
AST SERPL-CCNC: 56 U/L (ref 0–31)
BASOPHILS # BLD: 0.07 E9/L (ref 0–0.2)
BASOPHILS NFR BLD: 1.5 % (ref 0–2)
BILIRUB SERPL-MCNC: 0.6 MG/DL (ref 0–1.2)
BUN SERPL-MCNC: 13 MG/DL (ref 6–23)
CALCIUM SERPL-MCNC: 9.9 MG/DL (ref 8.6–10.2)
CHLORIDE SERPL-SCNC: 106 MMOL/L (ref 98–107)
CHOLESTEROL, TOTAL: 142 MG/DL (ref 0–199)
CO2 SERPL-SCNC: 25 MMOL/L (ref 22–29)
CREAT SERPL-MCNC: 1.1 MG/DL (ref 0.5–1)
EOSINOPHIL # BLD: 0.1 E9/L (ref 0.05–0.5)
EOSINOPHIL NFR BLD: 2.2 % (ref 0–6)
ERYTHROCYTE [DISTWIDTH] IN BLOOD BY AUTOMATED COUNT: 14 FL (ref 11.5–15)
GLUCOSE SERPL-MCNC: 93 MG/DL (ref 74–99)
HBA1C MFR BLD: 5.3 % (ref 4–5.6)
HCT VFR BLD AUTO: 48 % (ref 34–48)
HDLC SERPL-MCNC: 66 MG/DL
HGB BLD-MCNC: 14.7 G/DL (ref 11.5–15.5)
IMM GRANULOCYTES # BLD: 0.01 E9/L
IMM GRANULOCYTES NFR BLD: 0.2 % (ref 0–5)
LDLC SERPL CALC-MCNC: 61 MG/DL (ref 0–99)
LYMPHOCYTES # BLD: 1.9 E9/L (ref 1.5–4)
LYMPHOCYTES NFR BLD: 41.1 % (ref 20–42)
MCH RBC QN AUTO: 28.9 PG (ref 26–35)
MCHC RBC AUTO-ENTMCNC: 30.6 % (ref 32–34.5)
MCV RBC AUTO: 94.3 FL (ref 80–99.9)
MONOCYTES # BLD: 0.44 E9/L (ref 0.1–0.95)
MONOCYTES NFR BLD: 9.5 % (ref 2–12)
NEUTROPHILS # BLD: 2.1 E9/L (ref 1.8–7.3)
NEUTS SEG NFR BLD: 45.5 % (ref 43–80)
PLATELET # BLD AUTO: 250 E9/L (ref 130–450)
PMV BLD AUTO: 9.8 FL (ref 7–12)
POTASSIUM SERPL-SCNC: 4.1 MMOL/L (ref 3.5–5)
PROT SERPL-MCNC: 7.4 G/DL (ref 6.4–8.3)
RBC # BLD AUTO: 5.09 E12/L (ref 3.5–5.5)
SODIUM SERPL-SCNC: 147 MMOL/L (ref 132–146)
T4 FREE SERPL-MCNC: 1.22 NG/DL (ref 0.93–1.7)
TRIGL SERPL-MCNC: 76 MG/DL (ref 0–149)
TSH SERPL-MCNC: 1.72 UIU/ML (ref 0.27–4.2)
VITAMIN D 25-HYDROXY: 37 NG/ML (ref 30–100)
VLDLC SERPL CALC-MCNC: 15 MG/DL
WBC # BLD: 4.6 E9/L (ref 4.5–11.5)

## 2023-04-20 PROCEDURE — 36415 COLL VENOUS BLD VENIPUNCTURE: CPT | Performed by: FAMILY MEDICINE

## 2023-04-20 PROCEDURE — 99999 PR OFFICE/OUTPT VISIT,PROCEDURE ONLY: CPT | Performed by: FAMILY MEDICINE

## 2023-04-24 ENCOUNTER — OFFICE VISIT (OUTPATIENT)
Dept: FAMILY MEDICINE CLINIC | Age: 73
End: 2023-04-24
Payer: MEDICARE

## 2023-04-24 VITALS
DIASTOLIC BLOOD PRESSURE: 76 MMHG | HEART RATE: 62 BPM | BODY MASS INDEX: 31.52 KG/M2 | SYSTOLIC BLOOD PRESSURE: 118 MMHG | WEIGHT: 166.8 LBS | TEMPERATURE: 97.5 F | OXYGEN SATURATION: 99 % | RESPIRATION RATE: 17 BRPM

## 2023-04-24 DIAGNOSIS — E55.9 VITAMIN D DEFICIENCY: ICD-10-CM

## 2023-04-24 DIAGNOSIS — H57.89 MASS OF EYE, LEFT: ICD-10-CM

## 2023-04-24 DIAGNOSIS — R74.8 LIVER ENZYME ELEVATION: ICD-10-CM

## 2023-04-24 DIAGNOSIS — E78.2 MIXED HYPERLIPIDEMIA: ICD-10-CM

## 2023-04-24 DIAGNOSIS — I10 ESSENTIAL HYPERTENSION, MALIGNANT: ICD-10-CM

## 2023-04-24 DIAGNOSIS — R53.82 CHRONIC FATIGUE: ICD-10-CM

## 2023-04-24 DIAGNOSIS — K21.9 GASTROESOPHAGEAL REFLUX DISEASE WITHOUT ESOPHAGITIS: ICD-10-CM

## 2023-04-24 DIAGNOSIS — R74.8 ELEVATED LIVER ENZYMES: ICD-10-CM

## 2023-04-24 DIAGNOSIS — E03.9 ACQUIRED HYPOTHYROIDISM: ICD-10-CM

## 2023-04-24 DIAGNOSIS — E78.5 HYPERLIPIDEMIA, UNSPECIFIED HYPERLIPIDEMIA TYPE: ICD-10-CM

## 2023-04-24 DIAGNOSIS — K58.9 IRRITABLE BOWEL SYNDROME WITHOUT DIARRHEA: ICD-10-CM

## 2023-04-24 DIAGNOSIS — F41.9 ANXIETY: ICD-10-CM

## 2023-04-24 DIAGNOSIS — I10 ESSENTIAL HYPERTENSION: Primary | ICD-10-CM

## 2023-04-24 DIAGNOSIS — E03.9 HYPOTHYROIDISM, UNSPECIFIED TYPE: ICD-10-CM

## 2023-04-24 DIAGNOSIS — R73.01 IFG (IMPAIRED FASTING GLUCOSE): ICD-10-CM

## 2023-04-24 PROCEDURE — 3074F SYST BP LT 130 MM HG: CPT | Performed by: FAMILY MEDICINE

## 2023-04-24 PROCEDURE — 1123F ACP DISCUSS/DSCN MKR DOCD: CPT | Performed by: FAMILY MEDICINE

## 2023-04-24 PROCEDURE — 3078F DIAST BP <80 MM HG: CPT | Performed by: FAMILY MEDICINE

## 2023-04-24 PROCEDURE — 99214 OFFICE O/P EST MOD 30 MIN: CPT | Performed by: FAMILY MEDICINE

## 2023-04-24 RX ORDER — ALPRAZOLAM 1 MG/1
1 TABLET ORAL 2 TIMES DAILY PRN
Qty: 60 TABLET | Refills: 1 | Status: SHIPPED | OUTPATIENT
Start: 2023-04-24 | End: 2023-05-24

## 2023-04-24 ASSESSMENT — ENCOUNTER SYMPTOMS
FACIAL SWELLING: 0
WHEEZING: 0
CONSTIPATION: 0
EYE REDNESS: 1
ABDOMINAL DISTENTION: 0
APNEA: 0
NAUSEA: 0
ABDOMINAL PAIN: 1
PHOTOPHOBIA: 0
CHEST TIGHTNESS: 0
COUGH: 0
BACK PAIN: 0
RECTAL PAIN: 0
DIARRHEA: 0
SHORTNESS OF BREATH: 0
RHINORRHEA: 1
VOICE CHANGE: 0
VOMITING: 0
SORE THROAT: 0

## 2023-04-24 ASSESSMENT — PATIENT HEALTH QUESTIONNAIRE - PHQ9
SUM OF ALL RESPONSES TO PHQ QUESTIONS 1-9: 0
SUM OF ALL RESPONSES TO PHQ9 QUESTIONS 1 & 2: 0
1. LITTLE INTEREST OR PLEASURE IN DOING THINGS: 0
2. FEELING DOWN, DEPRESSED OR HOPELESS: 0

## 2023-04-24 NOTE — PROGRESS NOTES
results. [x] Reviewed and discussed Lab results. [x] Discussed the importance of drinking plenty of fluids. [x] Cut down on Salt, Caffeine, and Sugar.  [] Non Compliant Patient   [x] Communicated with patient any concerns, to phone office. 3 months recheck   Reviewed labs in detail   No change in meds   Recheck 3 months           No follow-ups on file.       Seen by:     Jason Charles DO

## 2023-06-02 DIAGNOSIS — F41.9 ANXIETY: ICD-10-CM

## 2023-06-02 DIAGNOSIS — I10 ESSENTIAL HYPERTENSION: ICD-10-CM

## 2023-06-02 RX ORDER — ROSUVASTATIN CALCIUM 10 MG/1
10 TABLET, COATED ORAL DAILY
Qty: 90 TABLET | Refills: 1 | Status: SHIPPED | OUTPATIENT
Start: 2023-06-02

## 2023-06-02 RX ORDER — PANTOPRAZOLE SODIUM 40 MG/1
40 TABLET, DELAYED RELEASE ORAL DAILY
Qty: 90 TABLET | Refills: 1 | Status: SHIPPED | OUTPATIENT
Start: 2023-06-02 | End: 2023-08-31

## 2023-06-02 RX ORDER — ALPRAZOLAM 1 MG/1
1 TABLET ORAL 2 TIMES DAILY PRN
Qty: 60 TABLET | Refills: 2 | Status: SHIPPED | OUTPATIENT
Start: 2023-06-02 | End: 2023-07-02

## 2023-06-02 RX ORDER — LEVOTHYROXINE SODIUM 25 MCG
25 TABLET ORAL DAILY
Qty: 90 TABLET | Refills: 1 | Status: SHIPPED | OUTPATIENT
Start: 2023-06-02 | End: 2023-08-31

## 2023-06-02 RX ORDER — SERTRALINE HYDROCHLORIDE 25 MG/1
25 TABLET, FILM COATED ORAL DAILY
Qty: 90 TABLET | Refills: 1 | Status: SHIPPED | OUTPATIENT
Start: 2023-06-02 | End: 2023-08-31

## 2023-06-02 RX ORDER — TRIAMTERENE AND HYDROCHLOROTHIAZIDE 37.5; 25 MG/1; MG/1
1 TABLET ORAL DAILY
Qty: 90 TABLET | Refills: 1 | Status: SHIPPED | OUTPATIENT
Start: 2023-06-02 | End: 2023-08-31

## 2023-06-08 DIAGNOSIS — R74.8 LIVER ENZYME ELEVATION: ICD-10-CM

## 2023-06-08 DIAGNOSIS — I10 ESSENTIAL HYPERTENSION: ICD-10-CM

## 2023-06-08 DIAGNOSIS — E55.9 VITAMIN D DEFICIENCY: ICD-10-CM

## 2023-06-08 DIAGNOSIS — E03.9 HYPOTHYROIDISM, UNSPECIFIED TYPE: ICD-10-CM

## 2023-06-08 DIAGNOSIS — E78.5 HYPERLIPIDEMIA, UNSPECIFIED HYPERLIPIDEMIA TYPE: ICD-10-CM

## 2023-06-08 DIAGNOSIS — R53.82 CHRONIC FATIGUE: Primary | ICD-10-CM

## 2023-06-30 ENCOUNTER — OFFICE VISIT (OUTPATIENT)
Dept: FAMILY MEDICINE CLINIC | Age: 73
End: 2023-06-30
Payer: MEDICARE

## 2023-06-30 VITALS
OXYGEN SATURATION: 97 % | RESPIRATION RATE: 16 BRPM | SYSTOLIC BLOOD PRESSURE: 116 MMHG | HEART RATE: 92 BPM | TEMPERATURE: 98.2 F | HEIGHT: 61 IN | BODY MASS INDEX: 32.45 KG/M2 | DIASTOLIC BLOOD PRESSURE: 72 MMHG | WEIGHT: 171.9 LBS

## 2023-06-30 DIAGNOSIS — R74.8 LIVER ENZYME ELEVATION: ICD-10-CM

## 2023-06-30 DIAGNOSIS — R51.9 FACIAL PAIN: ICD-10-CM

## 2023-06-30 DIAGNOSIS — K58.9 IRRITABLE BOWEL SYNDROME WITHOUT DIARRHEA: ICD-10-CM

## 2023-06-30 DIAGNOSIS — J01.90 ACUTE BACTERIAL SINUSITIS: ICD-10-CM

## 2023-06-30 DIAGNOSIS — K21.9 GASTROESOPHAGEAL REFLUX DISEASE WITHOUT ESOPHAGITIS: ICD-10-CM

## 2023-06-30 DIAGNOSIS — E55.9 VITAMIN D DEFICIENCY: ICD-10-CM

## 2023-06-30 DIAGNOSIS — R73.01 IFG (IMPAIRED FASTING GLUCOSE): ICD-10-CM

## 2023-06-30 DIAGNOSIS — H66.92 LEFT ACUTE OTITIS MEDIA: ICD-10-CM

## 2023-06-30 DIAGNOSIS — E78.2 MIXED HYPERLIPIDEMIA: ICD-10-CM

## 2023-06-30 DIAGNOSIS — B96.89 ACUTE BACTERIAL SINUSITIS: ICD-10-CM

## 2023-06-30 DIAGNOSIS — I10 ESSENTIAL HYPERTENSION: ICD-10-CM

## 2023-06-30 DIAGNOSIS — E78.5 HYPERLIPIDEMIA, UNSPECIFIED HYPERLIPIDEMIA TYPE: ICD-10-CM

## 2023-06-30 DIAGNOSIS — R09.81 SINUS CONGESTION: Primary | ICD-10-CM

## 2023-06-30 DIAGNOSIS — E03.9 HYPOTHYROIDISM, UNSPECIFIED TYPE: ICD-10-CM

## 2023-06-30 PROCEDURE — 3078F DIAST BP <80 MM HG: CPT | Performed by: FAMILY MEDICINE

## 2023-06-30 PROCEDURE — 3074F SYST BP LT 130 MM HG: CPT | Performed by: FAMILY MEDICINE

## 2023-06-30 PROCEDURE — 1123F ACP DISCUSS/DSCN MKR DOCD: CPT | Performed by: FAMILY MEDICINE

## 2023-06-30 PROCEDURE — 99213 OFFICE O/P EST LOW 20 MIN: CPT | Performed by: FAMILY MEDICINE

## 2023-06-30 RX ORDER — BUTALBITAL, ACETAMINOPHEN AND CAFFEINE 50; 325; 40 MG/1; MG/1; MG/1
2 TABLET ORAL EVERY 4 HOURS PRN
Qty: 180 TABLET | Refills: 3 | Status: SHIPPED | OUTPATIENT
Start: 2023-06-30

## 2023-06-30 RX ORDER — BUTALBITAL, ACETAMINOPHEN, CAFFEINE AND CODEINE PHOSPHATE 50; 325; 40; 30 MG/1; MG/1; MG/1; MG/1
CAPSULE ORAL
COMMUNITY
Start: 2023-06-19

## 2023-06-30 RX ORDER — CLINDAMYCIN HYDROCHLORIDE 300 MG/1
300 CAPSULE ORAL 3 TIMES DAILY
Qty: 30 CAPSULE | Refills: 1 | Status: SHIPPED | OUTPATIENT
Start: 2023-06-30 | End: 2023-07-20

## 2023-06-30 RX ORDER — ZINC GLUCONATE 100 MG
TABLET ORAL
COMMUNITY
Start: 2023-06-19

## 2023-06-30 RX ORDER — LORATADINE 10 MG/1
TABLET ORAL
COMMUNITY
Start: 2023-06-19

## 2023-06-30 ASSESSMENT — ENCOUNTER SYMPTOMS
SHORTNESS OF BREATH: 0
RECTAL PAIN: 0
ABDOMINAL PAIN: 1
SORE THROAT: 0
RHINORRHEA: 1
NAUSEA: 0
FACIAL SWELLING: 0
DIARRHEA: 0
WHEEZING: 0
SINUS PAIN: 1
CONSTIPATION: 0
BACK PAIN: 0
COUGH: 0
CHEST TIGHTNESS: 0
APNEA: 0
ABDOMINAL DISTENTION: 0
EYE REDNESS: 1
VOICE CHANGE: 0
SINUS PRESSURE: 1
PHOTOPHOBIA: 0
VOMITING: 0

## 2023-07-13 ENCOUNTER — OFFICE VISIT (OUTPATIENT)
Dept: FAMILY MEDICINE CLINIC | Age: 73
End: 2023-07-13
Payer: MEDICARE

## 2023-07-13 VITALS
HEIGHT: 61 IN | HEART RATE: 57 BPM | SYSTOLIC BLOOD PRESSURE: 124 MMHG | DIASTOLIC BLOOD PRESSURE: 76 MMHG | BODY MASS INDEX: 32.21 KG/M2 | TEMPERATURE: 97.8 F | OXYGEN SATURATION: 97 % | WEIGHT: 170.6 LBS

## 2023-07-13 DIAGNOSIS — J01.90 ACUTE BACTERIAL SINUSITIS: Primary | ICD-10-CM

## 2023-07-13 DIAGNOSIS — R53.82 CHRONIC FATIGUE: ICD-10-CM

## 2023-07-13 DIAGNOSIS — H66.92 LEFT ACUTE OTITIS MEDIA: ICD-10-CM

## 2023-07-13 DIAGNOSIS — R51.9 FACIAL PAIN: ICD-10-CM

## 2023-07-13 DIAGNOSIS — R74.8 LIVER ENZYME ELEVATION: ICD-10-CM

## 2023-07-13 DIAGNOSIS — E03.9 HYPOTHYROIDISM, UNSPECIFIED TYPE: ICD-10-CM

## 2023-07-13 DIAGNOSIS — E78.5 HYPERLIPIDEMIA, UNSPECIFIED HYPERLIPIDEMIA TYPE: ICD-10-CM

## 2023-07-13 DIAGNOSIS — I10 ESSENTIAL HYPERTENSION: ICD-10-CM

## 2023-07-13 DIAGNOSIS — E55.9 VITAMIN D DEFICIENCY: ICD-10-CM

## 2023-07-13 DIAGNOSIS — B96.89 ACUTE BACTERIAL SINUSITIS: Primary | ICD-10-CM

## 2023-07-13 LAB
ALBUMIN SERPL-MCNC: 4.7 G/DL (ref 3.5–5.2)
ALP SERPL-CCNC: 100 U/L (ref 35–104)
ALT SERPL-CCNC: 38 U/L (ref 0–32)
ANION GAP SERPL CALCULATED.3IONS-SCNC: 16 MMOL/L (ref 7–16)
AST SERPL-CCNC: 32 U/L (ref 0–31)
BILIRUB DIRECT SERPL-MCNC: <0.2 MG/DL (ref 0–0.3)
BILIRUB INDIRECT SERPL-MCNC: NORMAL MG/DL (ref 0–1)
BILIRUB SERPL-MCNC: 0.3 MG/DL (ref 0–1.2)
BUN SERPL-MCNC: 18 MG/DL (ref 6–23)
CALCIUM SERPL-MCNC: 10.4 MG/DL (ref 8.6–10.2)
CHLORIDE SERPL-SCNC: 103 MMOL/L (ref 98–107)
CO2 SERPL-SCNC: 24 MMOL/L (ref 22–29)
CREAT SERPL-MCNC: 1.1 MG/DL (ref 0.5–1)
GLUCOSE SERPL-MCNC: 94 MG/DL (ref 74–99)
IRON SATN MFR SERPL: 25 % (ref 15–50)
IRON SERPL-MCNC: 85 MCG/DL (ref 37–145)
POTASSIUM SERPL-SCNC: 3.6 MMOL/L (ref 3.5–5)
PROT SERPL-MCNC: 7.7 G/DL (ref 6.4–8.3)
SODIUM SERPL-SCNC: 143 MMOL/L (ref 132–146)
TIBC SERPL-MCNC: 345 MCG/DL (ref 250–450)

## 2023-07-13 PROCEDURE — 1123F ACP DISCUSS/DSCN MKR DOCD: CPT | Performed by: NURSE PRACTITIONER

## 2023-07-13 PROCEDURE — 99213 OFFICE O/P EST LOW 20 MIN: CPT | Performed by: NURSE PRACTITIONER

## 2023-07-13 PROCEDURE — 36415 COLL VENOUS BLD VENIPUNCTURE: CPT | Performed by: NURSE PRACTITIONER

## 2023-07-13 PROCEDURE — 3074F SYST BP LT 130 MM HG: CPT | Performed by: NURSE PRACTITIONER

## 2023-07-13 PROCEDURE — 3078F DIAST BP <80 MM HG: CPT | Performed by: NURSE PRACTITIONER

## 2023-07-13 RX ORDER — PREDNISONE 20 MG/1
20 TABLET ORAL DAILY
Qty: 10 TABLET | Refills: 0 | Status: SHIPPED | OUTPATIENT
Start: 2023-07-13 | End: 2023-07-23

## 2023-07-13 ASSESSMENT — ENCOUNTER SYMPTOMS
CONSTIPATION: 0
SINUS PAIN: 1
EYE PAIN: 0
BACK PAIN: 0
CHEST TIGHTNESS: 0
ABDOMINAL DISTENTION: 0
RHINORRHEA: 0
ABDOMINAL PAIN: 0
CHOKING: 0
FACIAL SWELLING: 0
COUGH: 1
TROUBLE SWALLOWING: 0
COLOR CHANGE: 0
WHEEZING: 0
EYE ITCHING: 0
NAUSEA: 0
EYE REDNESS: 0
STRIDOR: 0
SINUS PRESSURE: 1
PHOTOPHOBIA: 0
APNEA: 0
SORE THROAT: 1
VOMITING: 0
SHORTNESS OF BREATH: 0
BLOOD IN STOOL: 0
RECTAL PAIN: 0
EYE DISCHARGE: 0
ANAL BLEEDING: 0
DIARRHEA: 0
VOICE CHANGE: 0

## 2023-07-25 RX ORDER — AMLODIPINE BESYLATE 5 MG/1
TABLET ORAL
Qty: 90 TABLET | Refills: 1 | Status: SHIPPED | OUTPATIENT
Start: 2023-07-25

## 2023-07-25 NOTE — TELEPHONE ENCOUNTER
Patients last appointment 6/30/2023.   Patients next scheduled appointment   Future Appointments   Date Time Provider [address]   7/28/2023 11:45 AM DO EFRAIN Ferrera [facility]   11/13/2023  9:30 AM DO EFRAIN Ferrera [facility]

## 2023-07-28 ENCOUNTER — OFFICE VISIT (OUTPATIENT)
Dept: FAMILY MEDICINE CLINIC | Age: 73
End: 2023-07-28
Payer: MEDICARE

## 2023-07-28 VITALS
DIASTOLIC BLOOD PRESSURE: 76 MMHG | HEIGHT: 61 IN | OXYGEN SATURATION: 96 % | HEART RATE: 70 BPM | SYSTOLIC BLOOD PRESSURE: 122 MMHG | BODY MASS INDEX: 32.25 KG/M2 | WEIGHT: 170.8 LBS | TEMPERATURE: 97.4 F

## 2023-07-28 DIAGNOSIS — I10 ESSENTIAL HYPERTENSION: ICD-10-CM

## 2023-07-28 DIAGNOSIS — R53.82 CHRONIC FATIGUE: ICD-10-CM

## 2023-07-28 DIAGNOSIS — H57.89: ICD-10-CM

## 2023-07-28 DIAGNOSIS — K58.9 IRRITABLE BOWEL SYNDROME WITHOUT DIARRHEA: ICD-10-CM

## 2023-07-28 DIAGNOSIS — E03.9 HYPOTHYROIDISM, UNSPECIFIED TYPE: Primary | ICD-10-CM

## 2023-07-28 DIAGNOSIS — E55.9 VITAMIN D DEFICIENCY: ICD-10-CM

## 2023-07-28 DIAGNOSIS — E03.9 ACQUIRED HYPOTHYROIDISM: ICD-10-CM

## 2023-07-28 DIAGNOSIS — K21.9 GASTROESOPHAGEAL REFLUX DISEASE WITHOUT ESOPHAGITIS: ICD-10-CM

## 2023-07-28 DIAGNOSIS — E78.2 MIXED HYPERLIPIDEMIA: ICD-10-CM

## 2023-07-28 DIAGNOSIS — R74.8 ELEVATED LIVER ENZYMES: ICD-10-CM

## 2023-07-28 DIAGNOSIS — R51.9 ACUTE NONINTRACTABLE HEADACHE, UNSPECIFIED HEADACHE TYPE: ICD-10-CM

## 2023-07-28 DIAGNOSIS — R73.01 IFG (IMPAIRED FASTING GLUCOSE): ICD-10-CM

## 2023-07-28 PROCEDURE — 99213 OFFICE O/P EST LOW 20 MIN: CPT | Performed by: FAMILY MEDICINE

## 2023-07-28 PROCEDURE — 3074F SYST BP LT 130 MM HG: CPT | Performed by: FAMILY MEDICINE

## 2023-07-28 PROCEDURE — 1123F ACP DISCUSS/DSCN MKR DOCD: CPT | Performed by: FAMILY MEDICINE

## 2023-07-28 PROCEDURE — 3078F DIAST BP <80 MM HG: CPT | Performed by: FAMILY MEDICINE

## 2023-07-28 NOTE — PROGRESS NOTES
23  Negrita Gonzalez : 1950 Sex: female  Age: 68 y.o. Chief Complaint   Patient presents with    3 Month Follow-Up     No problems or concerns     Discuss Labs       HPI  overall check up   Labs review     Sinus long standing problems       Review of Systems   Constitutional:  Negative for activity change, appetite change, chills, diaphoresis, fatigue, fever and unexpected weight change. HENT:  Positive for congestion, ear pain, sinus pressure (on and off) and sinus pain. Negative for dental problem, drooling, ear discharge, facial swelling, hearing loss, mouth sores, nosebleeds, postnasal drip, rhinorrhea, sneezing, tinnitus, trouble swallowing and voice change. Eyes:  Negative for photophobia, pain, discharge, redness, itching and visual disturbance. Respiratory:  Positive for cough. Negative for apnea, choking, chest tightness, shortness of breath, wheezing and stridor. Cardiovascular:  Negative for chest pain, palpitations and leg swelling. Gastrointestinal:  Negative for abdominal distention, abdominal pain, anal bleeding, blood in stool, constipation, diarrhea, nausea, rectal pain and vomiting. Endocrine: Negative for cold intolerance, heat intolerance, polydipsia, polyphagia and polyuria. Genitourinary:  Negative for decreased urine volume, difficulty urinating, dysuria, enuresis, flank pain, frequency, genital sores, hematuria and urgency. Musculoskeletal:  Negative for arthralgias, back pain, gait problem, joint swelling, myalgias, neck pain and neck stiffness. Skin:  Negative for color change, pallor, rash and wound. Allergic/Immunologic: Negative for environmental allergies, food allergies and immunocompromised state. Neurological:  Positive for headaches. Negative for dizziness, tremors, seizures, syncope, facial asymmetry, speech difficulty, weakness, light-headedness and numbness. Hematological:  Negative for adenopathy. Does not bruise/bleed easily.

## 2023-08-08 ASSESSMENT — ENCOUNTER SYMPTOMS
VOMITING: 0
FACIAL SWELLING: 0
EYE REDNESS: 0
APNEA: 0
EYE ITCHING: 0
NAUSEA: 0
BACK PAIN: 0
SHORTNESS OF BREATH: 0
WHEEZING: 0
DIARRHEA: 0
RHINORRHEA: 0
COLOR CHANGE: 0
EYE PAIN: 0
CHOKING: 0
PHOTOPHOBIA: 0
EYE DISCHARGE: 0
CONSTIPATION: 0
ABDOMINAL DISTENTION: 0
COUGH: 1
TROUBLE SWALLOWING: 0
VOICE CHANGE: 0
ANAL BLEEDING: 0
SINUS PRESSURE: 1
CHEST TIGHTNESS: 0
RECTAL PAIN: 0
ABDOMINAL PAIN: 0
BLOOD IN STOOL: 0
SINUS PAIN: 1
STRIDOR: 0

## 2023-10-12 ENCOUNTER — NURSE ONLY (OUTPATIENT)
Dept: FAMILY MEDICINE CLINIC | Age: 73
End: 2023-10-12
Payer: MEDICARE

## 2023-10-12 DIAGNOSIS — E78.2 MIXED HYPERLIPIDEMIA: ICD-10-CM

## 2023-10-12 DIAGNOSIS — I10 ESSENTIAL HYPERTENSION: ICD-10-CM

## 2023-10-12 DIAGNOSIS — E55.9 VITAMIN D DEFICIENCY: ICD-10-CM

## 2023-10-12 DIAGNOSIS — E03.9 HYPOTHYROIDISM, UNSPECIFIED TYPE: ICD-10-CM

## 2023-10-12 DIAGNOSIS — R73.01 IFG (IMPAIRED FASTING GLUCOSE): ICD-10-CM

## 2023-10-12 DIAGNOSIS — I10 ESSENTIAL HYPERTENSION: Primary | ICD-10-CM

## 2023-10-12 PROCEDURE — 36415 COLL VENOUS BLD VENIPUNCTURE: CPT | Performed by: FAMILY MEDICINE

## 2023-10-13 LAB
ABSOLUTE IMMATURE GRANULOCYTE: <0.03 K/UL (ref 0–0.58)
ALBUMIN SERPL-MCNC: 4.6 G/DL (ref 3.5–5.2)
ALP BLD-CCNC: 99 U/L (ref 35–104)
ALT SERPL-CCNC: 17 U/L (ref 0–32)
ANION GAP SERPL CALCULATED.3IONS-SCNC: 15 MMOL/L (ref 7–16)
AST SERPL-CCNC: 29 U/L (ref 0–31)
BASOPHILS ABSOLUTE: 0.06 K/UL (ref 0–0.2)
BASOPHILS RELATIVE PERCENT: 1 % (ref 0–2)
BILIRUB SERPL-MCNC: 0.6 MG/DL (ref 0–1.2)
BUN BLDV-MCNC: 15 MG/DL (ref 6–23)
CALCIUM SERPL-MCNC: 10.2 MG/DL (ref 8.6–10.2)
CHLORIDE BLD-SCNC: 102 MMOL/L (ref 98–107)
CHOLESTEROL: 140 MG/DL
CO2: 23 MMOL/L (ref 22–29)
CREAT SERPL-MCNC: 1.1 MG/DL (ref 0.5–1)
EOSINOPHILS ABSOLUTE: 0.1 K/UL (ref 0.05–0.5)
EOSINOPHILS RELATIVE PERCENT: 2 % (ref 0–6)
GFR SERPL CREATININE-BSD FRML MDRD: 56 ML/MIN/1.73M2
GLUCOSE BLD-MCNC: 90 MG/DL (ref 74–99)
HBA1C MFR BLD: 5.3 % (ref 4–5.6)
HCT VFR BLD CALC: 49 % (ref 34–48)
HDLC SERPL-MCNC: 67 MG/DL
HEMOGLOBIN: 15.4 G/DL (ref 11.5–15.5)
IMMATURE GRANULOCYTES: 0 % (ref 0–5)
LDL CHOLESTEROL: 57 MG/DL
LYMPHOCYTES ABSOLUTE: 2.71 K/UL (ref 1.5–4)
LYMPHOCYTES RELATIVE PERCENT: 48 % (ref 20–42)
MCH RBC QN AUTO: 29.2 PG (ref 26–35)
MCHC RBC AUTO-ENTMCNC: 31.4 G/DL (ref 32–34.5)
MCV RBC AUTO: 93 FL (ref 80–99.9)
MONOCYTES ABSOLUTE: 0.49 K/UL (ref 0.1–0.95)
MONOCYTES RELATIVE PERCENT: 9 % (ref 2–12)
NEUTROPHILS ABSOLUTE: 2.25 K/UL (ref 1.8–7.3)
NEUTROPHILS RELATIVE PERCENT: 40 % (ref 43–80)
PDW BLD-RTO: 14.2 % (ref 11.5–15)
PLATELET # BLD: 278 K/UL (ref 130–450)
PMV BLD AUTO: 10 FL (ref 7–12)
POTASSIUM SERPL-SCNC: 4.1 MMOL/L (ref 3.5–5)
RBC # BLD: 5.27 M/UL (ref 3.5–5.5)
SODIUM BLD-SCNC: 140 MMOL/L (ref 132–146)
T4 FREE: 1.3 NG/DL (ref 0.9–1.7)
TOTAL PROTEIN: 7.8 G/DL (ref 6.4–8.3)
TRIGL SERPL-MCNC: 82 MG/DL
TSH SERPL DL<=0.05 MIU/L-ACNC: 3.64 UIU/ML (ref 0.27–4.2)
VITAMIN D 25-HYDROXY: 34.5 NG/ML (ref 30–100)
VLDLC SERPL CALC-MCNC: 16 MG/DL
WBC # BLD: 5.6 K/UL (ref 4.5–11.5)

## 2023-10-23 ENCOUNTER — OFFICE VISIT (OUTPATIENT)
Dept: FAMILY MEDICINE CLINIC | Age: 73
End: 2023-10-23
Payer: MEDICARE

## 2023-10-23 VITALS
SYSTOLIC BLOOD PRESSURE: 132 MMHG | BODY MASS INDEX: 32.23 KG/M2 | RESPIRATION RATE: 16 BRPM | OXYGEN SATURATION: 98 % | TEMPERATURE: 98 F | HEART RATE: 56 BPM | WEIGHT: 170.7 LBS | HEIGHT: 61 IN | DIASTOLIC BLOOD PRESSURE: 80 MMHG

## 2023-10-23 DIAGNOSIS — E78.5 HYPERLIPIDEMIA, UNSPECIFIED HYPERLIPIDEMIA TYPE: ICD-10-CM

## 2023-10-23 DIAGNOSIS — E03.9 ACQUIRED HYPOTHYROIDISM: ICD-10-CM

## 2023-10-23 DIAGNOSIS — R51.9 FACIAL PAIN: ICD-10-CM

## 2023-10-23 DIAGNOSIS — F41.9 ANXIETY: ICD-10-CM

## 2023-10-23 DIAGNOSIS — R51.9 ACUTE NONINTRACTABLE HEADACHE, UNSPECIFIED HEADACHE TYPE: ICD-10-CM

## 2023-10-23 DIAGNOSIS — M25.569 KNEE PAIN, UNSPECIFIED CHRONICITY, UNSPECIFIED LATERALITY: ICD-10-CM

## 2023-10-23 DIAGNOSIS — I10 ESSENTIAL HYPERTENSION: ICD-10-CM

## 2023-10-23 DIAGNOSIS — E03.9 HYPOTHYROIDISM, UNSPECIFIED TYPE: ICD-10-CM

## 2023-10-23 DIAGNOSIS — E55.9 VITAMIN D DEFICIENCY: ICD-10-CM

## 2023-10-23 DIAGNOSIS — R73.01 IFG (IMPAIRED FASTING GLUCOSE): Primary | ICD-10-CM

## 2023-10-23 DIAGNOSIS — K21.9 GASTROESOPHAGEAL REFLUX DISEASE WITHOUT ESOPHAGITIS: ICD-10-CM

## 2023-10-23 DIAGNOSIS — R53.82 CHRONIC FATIGUE: ICD-10-CM

## 2023-10-23 PROCEDURE — 3078F DIAST BP <80 MM HG: CPT | Performed by: FAMILY MEDICINE

## 2023-10-23 PROCEDURE — 1123F ACP DISCUSS/DSCN MKR DOCD: CPT | Performed by: FAMILY MEDICINE

## 2023-10-23 PROCEDURE — 99214 OFFICE O/P EST MOD 30 MIN: CPT | Performed by: FAMILY MEDICINE

## 2023-10-23 PROCEDURE — 3074F SYST BP LT 130 MM HG: CPT | Performed by: FAMILY MEDICINE

## 2023-10-23 RX ORDER — ALPRAZOLAM 1 MG/1
TABLET ORAL
COMMUNITY
Start: 2023-08-28

## 2023-10-23 ASSESSMENT — ENCOUNTER SYMPTOMS
CONSTIPATION: 0
ANAL BLEEDING: 0
SINUS PRESSURE: 1
CHOKING: 0
PHOTOPHOBIA: 0
FACIAL SWELLING: 0
SHORTNESS OF BREATH: 0
RHINORRHEA: 0
VOMITING: 0
STRIDOR: 0
EYE REDNESS: 0
EYE ITCHING: 0
COUGH: 1
DIARRHEA: 0
VOICE CHANGE: 0
BLOOD IN STOOL: 0
TROUBLE SWALLOWING: 0
RECTAL PAIN: 0
BACK PAIN: 0
EYE DISCHARGE: 0
APNEA: 0
EYE PAIN: 0
SINUS PAIN: 1
WHEEZING: 0
ABDOMINAL DISTENTION: 0
NAUSEA: 0
COLOR CHANGE: 0
CHEST TIGHTNESS: 0
ABDOMINAL PAIN: 0

## 2023-10-23 NOTE — PROGRESS NOTES
Hernia: No hernia is present. Musculoskeletal:         General: No swelling, tenderness, deformity or signs of injury. Normal range of motion. Cervical back: Normal range of motion and neck supple. No rigidity. No muscular tenderness. Right lower leg: No edema. Left lower leg: No edema. Lymphadenopathy:      Cervical: No cervical adenopathy. Skin:     General: Skin is warm and dry. Capillary Refill: Capillary refill takes less than 2 seconds. Coloration: Skin is not jaundiced or pale. Findings: No bruising, erythema, lesion or rash. Neurological:      General: No focal deficit present. Mental Status: She is alert and oriented to person, place, and time. Mental status is at baseline. Cranial Nerves: No cranial nerve deficit. Sensory: No sensory deficit. Motor: No weakness. Coordination: Coordination normal.      Gait: Gait normal.      Deep Tendon Reflexes: Reflexes normal.   Psychiatric:         Mood and Affect: Mood normal.         Behavior: Behavior normal.         Thought Content: Thought content normal.         Judgment: Judgment normal.         Assessment and Plan:  Chon Edward was seen today for discuss labs. Diagnoses and all orders for this visit:    IFG (impaired fasting glucose)    Essential hypertension    Hypothyroidism, unspecified type    Chronic fatigue    Vitamin D deficiency    Acquired hypothyroidism    Gastroesophageal reflux disease without esophagitis    Facial pain    Acute nonintractable headache, unspecified headache type    Hyperlipidemia, unspecified hyperlipidemia type    Anxiety    Knee pain, unspecified chronicity, unspecified laterality          Discussions/Education provided to patients during visit:  [] Discussed the importance to stop smoking. [] Advised to monitor eating habits. [] Reviewed and discussed Imaging results. [x] Reviewed and discussed Lab results.   [x] Discussed the importance of drinking plenty of

## 2023-11-24 ENCOUNTER — OFFICE VISIT (OUTPATIENT)
Age: 73
End: 2023-11-24
Payer: MEDICARE

## 2023-11-24 VITALS
BODY MASS INDEX: 32.23 KG/M2 | HEART RATE: 65 BPM | DIASTOLIC BLOOD PRESSURE: 68 MMHG | TEMPERATURE: 97.9 F | OXYGEN SATURATION: 96 % | HEIGHT: 61 IN | SYSTOLIC BLOOD PRESSURE: 106 MMHG | RESPIRATION RATE: 16 BRPM | WEIGHT: 170.7 LBS

## 2023-11-24 DIAGNOSIS — J40 BRONCHITIS: ICD-10-CM

## 2023-11-24 DIAGNOSIS — K21.9 GASTROESOPHAGEAL REFLUX DISEASE WITHOUT ESOPHAGITIS: ICD-10-CM

## 2023-11-24 DIAGNOSIS — E78.2 MIXED HYPERLIPIDEMIA: ICD-10-CM

## 2023-11-24 DIAGNOSIS — R05.9 COUGH, UNSPECIFIED TYPE: Primary | ICD-10-CM

## 2023-11-24 DIAGNOSIS — I10 ESSENTIAL HYPERTENSION: ICD-10-CM

## 2023-11-24 LAB
INFLUENZA A ANTIBODY: NORMAL
INFLUENZA B ANTIBODY: NORMAL
Lab: NORMAL
PERFORMING INSTRUMENT: NORMAL
QC PASS/FAIL: NORMAL
SARS-COV-2, POC: NORMAL

## 2023-11-24 PROCEDURE — 87426 SARSCOV CORONAVIRUS AG IA: CPT | Performed by: FAMILY MEDICINE

## 2023-11-24 PROCEDURE — 87804 INFLUENZA ASSAY W/OPTIC: CPT | Performed by: FAMILY MEDICINE

## 2023-11-24 PROCEDURE — 3074F SYST BP LT 130 MM HG: CPT | Performed by: FAMILY MEDICINE

## 2023-11-24 PROCEDURE — 99213 OFFICE O/P EST LOW 20 MIN: CPT | Performed by: FAMILY MEDICINE

## 2023-11-24 PROCEDURE — 1123F ACP DISCUSS/DSCN MKR DOCD: CPT | Performed by: FAMILY MEDICINE

## 2023-11-24 PROCEDURE — 3078F DIAST BP <80 MM HG: CPT | Performed by: FAMILY MEDICINE

## 2023-11-24 RX ORDER — CODEINE PHOSPHATE/GUAIFENESIN 10-100MG/5
5 LIQUID (ML) ORAL 2 TIMES DAILY PRN
Qty: 200 ML | Refills: 0 | Status: SHIPPED | OUTPATIENT
Start: 2023-11-24 | End: 2023-12-14

## 2023-11-24 RX ORDER — AMOXICILLIN AND CLAVULANATE POTASSIUM 875; 125 MG/1; MG/1
1 TABLET, FILM COATED ORAL 2 TIMES DAILY
Qty: 20 TABLET | Refills: 0 | Status: SHIPPED | OUTPATIENT
Start: 2023-11-24 | End: 2023-12-04

## 2023-11-24 RX ORDER — PREDNISONE 10 MG/1
TABLET ORAL
Qty: 15 TABLET | Refills: 0 | Status: SHIPPED | OUTPATIENT
Start: 2023-11-24

## 2023-11-24 ASSESSMENT — ENCOUNTER SYMPTOMS
RHINORRHEA: 1
SORE THROAT: 1
COUGH: 1
SINUS PAIN: 1
SINUS PRESSURE: 1

## 2023-11-24 NOTE — PROGRESS NOTES
23  Nyla Mayer : 1950 Sex: female  Age: 68 y.o. Chief Complaint   Patient presents with    Cough     Productive cough-cloudy but no color, sinus congestion, no voice, previous left ear pain and sore throat-no longer, headache, chest pain with hard cough, for 3 days       HPI coughing with chest pain and headache   Sinus and ear pressure     Review of Systems   HENT:  Positive for congestion, postnasal drip, rhinorrhea, sinus pressure, sinus pain and sore throat. Respiratory:  Positive for cough (cloudy yellow). Physical Exam  HENT:      Ears:      Comments: Left ear tm inflamation      Nose: Rhinorrhea present. No congestion. Pulmonary:      Breath sounds: Rhonchi (cloudy and tinge yellow) present. Assessment and Plan:  Manda Russell was seen today for cough. Diagnoses and all orders for this visit:    Cough, unspecified type  -     POCT Influenza A/B  -     POCT COVID-19, Antigen  -     guaiFENesin-codeine (TUSSI-ORGANIDIN NR) 100-10 MG/5ML syrup; Take 5 mLs by mouth 2 times daily as needed for Cough for up to 20 days. Max Daily Amount: 10 mLs    Bronchitis    Essential hypertension    Gastroesophageal reflux disease without esophagitis    Mixed hyperlipidemia    Other orders  -     amoxicillin-clavulanate (AUGMENTIN) 875-125 MG per tablet; Take 1 tablet by mouth 2 times daily for 10 days  -     predniSONE (DELTASONE) 10 MG tablet; 2 daily for 5 days then 1 day for 5 days          Discussions/Education provided to patients during visit:  [] Discussed the importance to stop smoking. [] Advised to monitor eating habits. [] Reviewed and discussed Imaging results. [] Reviewed and discussed Lab results. [] Discussed the importance of drinking plenty of fluids. [] Cut down on Salt, Caffeine, and Sugar.  [] Non Compliant Patient   [x] Communicated with patient any concerns, to phone office. Prednisone ,augmentin . cough med   Recheck 1 week prn             No follow-ups on

## 2023-12-04 RX ORDER — ROSUVASTATIN CALCIUM 10 MG/1
10 TABLET, COATED ORAL DAILY
Qty: 90 TABLET | Refills: 1 | Status: SHIPPED | OUTPATIENT
Start: 2023-12-04

## 2023-12-04 RX ORDER — TRIAMTERENE AND HYDROCHLOROTHIAZIDE 37.5; 25 MG/1; MG/1
1 TABLET ORAL DAILY
Qty: 90 TABLET | Refills: 1 | Status: SHIPPED | OUTPATIENT
Start: 2023-12-04

## 2023-12-04 NOTE — TELEPHONE ENCOUNTER
Patients last appointment 11/24/2023. Patients next scheduled appointment   Future Appointments   Date Time Provider [address]   1/22/2024  8:00 AM SCHEDULE, MHYX E.  [facility]   1/26/2024 10:30 AM Diaz Tolbert Portal, DO EFRAIN EDWARD [facility]

## 2024-01-02 ENCOUNTER — OFFICE VISIT (OUTPATIENT)
Dept: FAMILY MEDICINE CLINIC | Age: 74
End: 2024-01-02
Payer: MEDICARE

## 2024-01-02 VITALS
TEMPERATURE: 98 F | DIASTOLIC BLOOD PRESSURE: 68 MMHG | HEART RATE: 56 BPM | BODY MASS INDEX: 32.1 KG/M2 | HEIGHT: 61 IN | SYSTOLIC BLOOD PRESSURE: 138 MMHG | RESPIRATION RATE: 17 BRPM | OXYGEN SATURATION: 98 % | WEIGHT: 170 LBS

## 2024-01-02 DIAGNOSIS — J01.41 ACUTE RECURRENT PANSINUSITIS: Primary | ICD-10-CM

## 2024-01-02 DIAGNOSIS — I10 ESSENTIAL HYPERTENSION: ICD-10-CM

## 2024-01-02 PROCEDURE — 99213 OFFICE O/P EST LOW 20 MIN: CPT | Performed by: NURSE PRACTITIONER

## 2024-01-02 PROCEDURE — 1123F ACP DISCUSS/DSCN MKR DOCD: CPT | Performed by: NURSE PRACTITIONER

## 2024-01-02 PROCEDURE — 3075F SYST BP GE 130 - 139MM HG: CPT | Performed by: NURSE PRACTITIONER

## 2024-01-02 PROCEDURE — 3078F DIAST BP <80 MM HG: CPT | Performed by: NURSE PRACTITIONER

## 2024-01-02 RX ORDER — METHYLPREDNISOLONE 4 MG/1
TABLET ORAL
Qty: 1 KIT | Refills: 0 | Status: SHIPPED | OUTPATIENT
Start: 2024-01-02 | End: 2024-01-08

## 2024-01-02 RX ORDER — AZITHROMYCIN 250 MG/1
250 TABLET, FILM COATED ORAL SEE ADMIN INSTRUCTIONS
Qty: 6 TABLET | Refills: 0 | Status: SHIPPED | OUTPATIENT
Start: 2024-01-02 | End: 2024-01-07

## 2024-01-02 ASSESSMENT — PATIENT HEALTH QUESTIONNAIRE - PHQ9
SUM OF ALL RESPONSES TO PHQ QUESTIONS 1-9: 0
SUM OF ALL RESPONSES TO PHQ QUESTIONS 1-9: 0
2. FEELING DOWN, DEPRESSED OR HOPELESS: 0
SUM OF ALL RESPONSES TO PHQ QUESTIONS 1-9: 0
1. LITTLE INTEREST OR PLEASURE IN DOING THINGS: 0
SUM OF ALL RESPONSES TO PHQ QUESTIONS 1-9: 0
SUM OF ALL RESPONSES TO PHQ9 QUESTIONS 1 & 2: 0
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
## 2024-01-03 ASSESSMENT — ENCOUNTER SYMPTOMS
CONSTIPATION: 0
SORE THROAT: 0
EYE DISCHARGE: 0
BACK PAIN: 0
STRIDOR: 0
TROUBLE SWALLOWING: 0
NAUSEA: 0
EYE ITCHING: 0
FACIAL SWELLING: 0
EYE PAIN: 0
ABDOMINAL PAIN: 0
DIARRHEA: 0
RHINORRHEA: 0
VOICE CHANGE: 0
ABDOMINAL DISTENTION: 0
COLOR CHANGE: 0
BLOOD IN STOOL: 0
VOMITING: 0
SINUS PRESSURE: 1
SINUS PAIN: 0
WHEEZING: 0
COUGH: 0
CHOKING: 0
CHEST TIGHTNESS: 0
SHORTNESS OF BREATH: 0
APNEA: 0
ANAL BLEEDING: 0
EYE REDNESS: 0
RECTAL PAIN: 0
PHOTOPHOBIA: 0

## 2024-01-22 ENCOUNTER — NURSE ONLY (OUTPATIENT)
Dept: FAMILY MEDICINE CLINIC | Age: 74
End: 2024-01-22
Payer: MEDICARE

## 2024-01-22 DIAGNOSIS — E03.9 HYPOTHYROIDISM, UNSPECIFIED TYPE: ICD-10-CM

## 2024-01-22 DIAGNOSIS — H57.89: ICD-10-CM

## 2024-01-22 DIAGNOSIS — E55.9 VITAMIN D DEFICIENCY: ICD-10-CM

## 2024-01-22 DIAGNOSIS — R51.9 ACUTE NONINTRACTABLE HEADACHE, UNSPECIFIED HEADACHE TYPE: ICD-10-CM

## 2024-01-22 DIAGNOSIS — R53.82 CHRONIC FATIGUE: ICD-10-CM

## 2024-01-22 DIAGNOSIS — E78.2 MIXED HYPERLIPIDEMIA: ICD-10-CM

## 2024-01-22 DIAGNOSIS — K58.9 IRRITABLE BOWEL SYNDROME WITHOUT DIARRHEA: ICD-10-CM

## 2024-01-22 DIAGNOSIS — E03.9 ACQUIRED HYPOTHYROIDISM: ICD-10-CM

## 2024-01-22 DIAGNOSIS — K21.9 GASTROESOPHAGEAL REFLUX DISEASE WITHOUT ESOPHAGITIS: ICD-10-CM

## 2024-01-22 DIAGNOSIS — R73.01 IFG (IMPAIRED FASTING GLUCOSE): ICD-10-CM

## 2024-01-22 DIAGNOSIS — I10 ESSENTIAL HYPERTENSION: Primary | ICD-10-CM

## 2024-01-22 DIAGNOSIS — R74.8 ELEVATED LIVER ENZYMES: ICD-10-CM

## 2024-01-22 DIAGNOSIS — I10 ESSENTIAL HYPERTENSION: ICD-10-CM

## 2024-01-22 LAB
ABSOLUTE IMMATURE GRANULOCYTE: <0.03 K/UL (ref 0–0.58)
BASOPHILS ABSOLUTE: 0.07 K/UL (ref 0–0.2)
BASOPHILS RELATIVE PERCENT: 1 % (ref 0–2)
EOSINOPHILS ABSOLUTE: 0.15 K/UL (ref 0.05–0.5)
EOSINOPHILS RELATIVE PERCENT: 3 % (ref 0–6)
HCT VFR BLD CALC: 49.1 % (ref 34–48)
HEMOGLOBIN: 15.4 G/DL (ref 11.5–15.5)
IMMATURE GRANULOCYTES: 0 % (ref 0–5)
LYMPHOCYTES ABSOLUTE: 2.35 K/UL (ref 1.5–4)
LYMPHOCYTES RELATIVE PERCENT: 46 % (ref 20–42)
MCH RBC QN AUTO: 29.2 PG (ref 26–35)
MCHC RBC AUTO-ENTMCNC: 31.4 G/DL (ref 32–34.5)
MCV RBC AUTO: 93 FL (ref 80–99.9)
MONOCYTES ABSOLUTE: 0.44 K/UL (ref 0.1–0.95)
MONOCYTES RELATIVE PERCENT: 9 % (ref 2–12)
NEUTROPHILS ABSOLUTE: 2.14 K/UL (ref 1.8–7.3)
NEUTROPHILS RELATIVE PERCENT: 42 % (ref 43–80)
PDW BLD-RTO: 14.2 % (ref 11.5–15)
PLATELET # BLD: 225 K/UL (ref 130–450)
PMV BLD AUTO: 10.1 FL (ref 7–12)
RBC # BLD: 5.28 M/UL (ref 3.5–5.5)
WBC # BLD: 5.2 K/UL (ref 4.5–11.5)

## 2024-01-22 PROCEDURE — 36415 COLL VENOUS BLD VENIPUNCTURE: CPT | Performed by: FAMILY MEDICINE

## 2024-01-23 LAB
ALBUMIN SERPL-MCNC: 4.6 G/DL (ref 3.5–5.2)
ALP BLD-CCNC: 98 U/L (ref 35–104)
ALT SERPL-CCNC: 24 U/L (ref 0–32)
ANION GAP SERPL CALCULATED.3IONS-SCNC: 18 MMOL/L (ref 7–16)
AST SERPL-CCNC: 31 U/L (ref 0–31)
BILIRUB SERPL-MCNC: 0.5 MG/DL (ref 0–1.2)
BUN BLDV-MCNC: 16 MG/DL (ref 6–23)
CALCIUM SERPL-MCNC: 10.2 MG/DL (ref 8.6–10.2)
CHLORIDE BLD-SCNC: 103 MMOL/L (ref 98–107)
CHOLESTEROL: 155 MG/DL
CO2: 25 MMOL/L (ref 22–29)
CREAT SERPL-MCNC: 1.1 MG/DL (ref 0.5–1)
GFR SERPL CREATININE-BSD FRML MDRD: 56 ML/MIN/1.73M2
GLUCOSE BLD-MCNC: 94 MG/DL (ref 74–99)
HBA1C MFR BLD: 5.6 % (ref 4–5.6)
HDLC SERPL-MCNC: 73 MG/DL
LDL CHOLESTEROL: 67 MG/DL
POTASSIUM SERPL-SCNC: 4.1 MMOL/L (ref 3.5–5)
SODIUM BLD-SCNC: 146 MMOL/L (ref 132–146)
T4 FREE: 1.2 NG/DL (ref 0.9–1.7)
TOTAL PROTEIN: 7.4 G/DL (ref 6.4–8.3)
TRIGL SERPL-MCNC: 77 MG/DL
TSH SERPL DL<=0.05 MIU/L-ACNC: 4.05 UIU/ML (ref 0.27–4.2)
VITAMIN D 25-HYDROXY: 38.8 NG/ML (ref 30–100)
VLDLC SERPL CALC-MCNC: 15 MG/DL

## 2024-02-16 ENCOUNTER — OFFICE VISIT (OUTPATIENT)
Dept: FAMILY MEDICINE CLINIC | Age: 74
End: 2024-02-16
Payer: MEDICARE

## 2024-02-16 VITALS
HEART RATE: 59 BPM | OXYGEN SATURATION: 97 % | RESPIRATION RATE: 16 BRPM | TEMPERATURE: 97.9 F | HEIGHT: 62 IN | SYSTOLIC BLOOD PRESSURE: 124 MMHG | BODY MASS INDEX: 32 KG/M2 | WEIGHT: 173.9 LBS | DIASTOLIC BLOOD PRESSURE: 82 MMHG

## 2024-02-16 VITALS
BODY MASS INDEX: 32.83 KG/M2 | WEIGHT: 173.9 LBS | HEIGHT: 61 IN | OXYGEN SATURATION: 97 % | HEART RATE: 59 BPM | TEMPERATURE: 97.9 F | DIASTOLIC BLOOD PRESSURE: 82 MMHG | RESPIRATION RATE: 16 BRPM | SYSTOLIC BLOOD PRESSURE: 124 MMHG

## 2024-02-16 DIAGNOSIS — K21.9 GASTROESOPHAGEAL REFLUX DISEASE WITHOUT ESOPHAGITIS: ICD-10-CM

## 2024-02-16 DIAGNOSIS — E78.2 MIXED HYPERLIPIDEMIA: ICD-10-CM

## 2024-02-16 DIAGNOSIS — Z00.00 MEDICARE ANNUAL WELLNESS VISIT, SUBSEQUENT: Primary | ICD-10-CM

## 2024-02-16 DIAGNOSIS — E03.9 HYPOTHYROIDISM, UNSPECIFIED TYPE: ICD-10-CM

## 2024-02-16 DIAGNOSIS — E03.9 ACQUIRED HYPOTHYROIDISM: ICD-10-CM

## 2024-02-16 DIAGNOSIS — R73.01 IFG (IMPAIRED FASTING GLUCOSE): ICD-10-CM

## 2024-02-16 DIAGNOSIS — E55.9 VITAMIN D DEFICIENCY: ICD-10-CM

## 2024-02-16 DIAGNOSIS — R53.82 CHRONIC FATIGUE: ICD-10-CM

## 2024-02-16 DIAGNOSIS — I10 ESSENTIAL HYPERTENSION: Primary | ICD-10-CM

## 2024-02-16 PROCEDURE — 3079F DIAST BP 80-89 MM HG: CPT | Performed by: FAMILY MEDICINE

## 2024-02-16 PROCEDURE — 99214 OFFICE O/P EST MOD 30 MIN: CPT | Performed by: FAMILY MEDICINE

## 2024-02-16 PROCEDURE — G0439 PPPS, SUBSEQ VISIT: HCPCS | Performed by: FAMILY MEDICINE

## 2024-02-16 PROCEDURE — 1123F ACP DISCUSS/DSCN MKR DOCD: CPT | Performed by: FAMILY MEDICINE

## 2024-02-16 PROCEDURE — 3074F SYST BP LT 130 MM HG: CPT | Performed by: FAMILY MEDICINE

## 2024-02-16 SDOH — ECONOMIC STABILITY: INCOME INSECURITY: HOW HARD IS IT FOR YOU TO PAY FOR THE VERY BASICS LIKE FOOD, HOUSING, MEDICAL CARE, AND HEATING?: NOT HARD AT ALL

## 2024-02-16 SDOH — ECONOMIC STABILITY: FOOD INSECURITY: WITHIN THE PAST 12 MONTHS, THE FOOD YOU BOUGHT JUST DIDN'T LAST AND YOU DIDN'T HAVE MONEY TO GET MORE.: NEVER TRUE

## 2024-02-16 SDOH — ECONOMIC STABILITY: FOOD INSECURITY: WITHIN THE PAST 12 MONTHS, YOU WORRIED THAT YOUR FOOD WOULD RUN OUT BEFORE YOU GOT MONEY TO BUY MORE.: NEVER TRUE

## 2024-02-16 ASSESSMENT — PATIENT HEALTH QUESTIONNAIRE - PHQ9
SUM OF ALL RESPONSES TO PHQ9 QUESTIONS 1 & 2: 0
SUM OF ALL RESPONSES TO PHQ QUESTIONS 1-9: 0
2. FEELING DOWN, DEPRESSED OR HOPELESS: 0
SUM OF ALL RESPONSES TO PHQ QUESTIONS 1-9: 0
SUM OF ALL RESPONSES TO PHQ QUESTIONS 1-9: 0
1. LITTLE INTEREST OR PLEASURE IN DOING THINGS: 0
SUM OF ALL RESPONSES TO PHQ QUESTIONS 1-9: 0

## 2024-02-16 ASSESSMENT — LIFESTYLE VARIABLES
HOW MANY STANDARD DRINKS CONTAINING ALCOHOL DO YOU HAVE ON A TYPICAL DAY: 1 OR 2
HOW OFTEN DO YOU HAVE A DRINK CONTAINING ALCOHOL: 2-4 TIMES A MONTH

## 2024-02-16 NOTE — PROGRESS NOTES
24  Purnima Rondon : 1950 Sex: female  Age: 73 y.o.    Chief Complaint   Patient presents with    Discuss Labs     3 month follow up       HPI  overall check up       Review of Systems   Constitutional:  Negative for activity change, appetite change, chills, diaphoresis, fatigue, fever and unexpected weight change.   HENT:  Positive for congestion. Negative for dental problem, drooling, ear discharge, facial swelling, hearing loss, mouth sores, nosebleeds, postnasal drip, rhinorrhea, sneezing, tinnitus, trouble swallowing and voice change. Sinus pressure: on and off.   Eyes:  Negative for photophobia, pain, discharge, redness, itching and visual disturbance.   Respiratory:  Positive for cough. Negative for apnea, choking, chest tightness, shortness of breath, wheezing and stridor.    Cardiovascular:  Negative for chest pain, palpitations and leg swelling.   Gastrointestinal:  Negative for abdominal distention, abdominal pain, anal bleeding, blood in stool, constipation, diarrhea, nausea, rectal pain and vomiting.   Endocrine: Negative for cold intolerance, heat intolerance, polydipsia, polyphagia and polyuria.   Genitourinary:  Negative for decreased urine volume, difficulty urinating, dysuria, enuresis, flank pain, frequency, genital sores, hematuria and urgency.   Musculoskeletal:  Negative for arthralgias, back pain, gait problem, joint swelling, myalgias, neck pain and neck stiffness.   Skin:  Negative for color change, pallor, rash and wound.   Allergic/Immunologic: Negative for environmental allergies, food allergies and immunocompromised state.   Neurological:  Positive for headaches. Negative for dizziness, tremors, seizures, syncope, facial asymmetry, speech difficulty, weakness, light-headedness and numbness.   Hematological:  Negative for adenopathy. Does not bruise/bleed easily.   Psychiatric/Behavioral:  Negative for agitation, behavioral problems, confusion, decreased concentration,

## 2024-02-19 NOTE — PROGRESS NOTES
Medicare Annual Wellness Visit    Purnima Rondon is here for Medicare AWV    Assessment & Plan   Medicare annual wellness visit, subsequent  Recommendations for Preventive Services Due: see orders and patient instructions/AVS.  Recommended screening schedule for the next 5-10 years is provided to the patient in written form: see Patient Instructions/AVS.     Return for Medicare Annual Wellness Visit in 1 year.     Subjective       Patient's complete Health Risk Assessment and screening values have been reviewed and are found in Flowsheets. The following problems were reviewed today and where indicated follow up appointments were made and/or referrals ordered.    Positive Risk Factor Screenings with Interventions:               General HRA Questions:  Select all that apply: (!) Stress    Stress Interventions:  Patient declined any further interventions or treatment      Activity, Diet, and Weight:  On average, how many days per week do you engage in moderate to strenuous exercise (like a brisk walk)?: 3 days  On average, how many minutes do you engage in exercise at this level?: 60 min    Do you eat balanced/healthy meals regularly?: Yes    Body mass index is 31.81 kg/m². (!) Abnormal    Obesity Interventions:  Patient declines any further evaluation or treatment               Safety:  Do you have non-slip mats or non-slip surfaces or shower bars or grab bars in your shower or bathtub?: (!) No  Interventions:  Patient advised to follow up in the office for further evaluation and treatment     Advanced Directives:  Do you have a Living Will?: (!) No    Intervention:                       Objective   Vitals:    02/16/24 1051   BP: 124/82   Site: Left Upper Arm   Position: Sitting   Cuff Size: Large Adult   Pulse: 59   Resp: 16   Temp: 97.9 °F (36.6 °C)   TempSrc: Temporal   SpO2: 97%   Weight: 78.9 kg (173 lb 14.4 oz)   Height: 1.575 m (5' 2\")      Body mass index is 31.81 kg/m².               Allergies   Allergen

## 2024-02-19 NOTE — PATIENT INSTRUCTIONS
Recommendations:    A preventive eye exam performed by an eye specialist is recommended every 1-2 years to screen for glaucoma; cataracts, macular degeneration, and other eye disorders.  A preventive dental visit is recommended every 6 months.  Try to get at least 150 minutes of exercise per week or 10,000 steps per day on a pedometer .  Order or download the FREE \"Exercise & Physical Activity: Your Everyday Guide\" from [employer]. Call 1-882.275.2232 or search [employer] online.  You need 2871-0945 mg of calcium and 7606-4303 IU of vitamin D per day. It is possible to meet your calcium requirement with diet alone, but a vitamin D supplement is usually necessary to meet this goal.  When exposed to the sun, use a sunscreen that protects against both UVA and UVB radiation with an SPF of 30 or greater. Reapply every 2 to 3 hours or after sweating, drying off with a towel, or swimming.  Always wear a seat belt when traveling in a car. Always wear a helmet when riding a bicycle or motorcycle.

## 2024-03-04 RX ORDER — AMLODIPINE BESYLATE 5 MG/1
5 TABLET ORAL DAILY
Qty: 90 TABLET | Refills: 1 | Status: SHIPPED | OUTPATIENT
Start: 2024-03-04

## 2024-03-04 RX ORDER — TRIAMTERENE AND HYDROCHLOROTHIAZIDE 37.5; 25 MG/1; MG/1
1 TABLET ORAL DAILY
Qty: 90 TABLET | Refills: 1 | Status: SHIPPED | OUTPATIENT
Start: 2024-03-04

## 2024-03-04 NOTE — TELEPHONE ENCOUNTER
Patients last appointment 2/16/2024.  Patients next scheduled appointment   Future Appointments   Date Time Provider Department Center   5/8/2024  8:20 AM SCHEDULE, RHONA ZUNIGA [facility]   5/10/2024  9:30 AM Jamie Castanon DO E. [facility]   2/21/2025 10:00 AM Jamie Castanon DO E. [facility]

## 2024-04-15 RX ORDER — SERTRALINE HYDROCHLORIDE 25 MG/1
25 TABLET, FILM COATED ORAL DAILY
Qty: 90 TABLET | Refills: 1 | Status: SHIPPED | OUTPATIENT
Start: 2024-04-15

## 2024-04-15 RX ORDER — LEVOTHYROXINE SODIUM 25 MCG
25 TABLET ORAL DAILY
Qty: 90 TABLET | Refills: 1 | Status: SHIPPED | OUTPATIENT
Start: 2024-04-15

## 2024-04-15 NOTE — TELEPHONE ENCOUNTER
Patients last appointment 2/16/2024.  Patients next scheduled appointment   Future Appointments   Date Time Provider Department Center   5/8/2024  8:20 AM SCHEDULE, RHONA ZUNIGA [facility]   5/10/2024  9:30 AM Jamie Castanon DO E. [facility]   2/21/2025 10:00 AM Jamie Castanon DO E. [facility]

## 2024-05-08 ENCOUNTER — NURSE ONLY (OUTPATIENT)
Dept: FAMILY MEDICINE CLINIC | Age: 74
End: 2024-05-08
Payer: MEDICARE

## 2024-05-08 DIAGNOSIS — I10 ESSENTIAL HYPERTENSION: Primary | ICD-10-CM

## 2024-05-08 DIAGNOSIS — E55.9 VITAMIN D DEFICIENCY: ICD-10-CM

## 2024-05-08 DIAGNOSIS — E78.2 MIXED HYPERLIPIDEMIA: ICD-10-CM

## 2024-05-08 DIAGNOSIS — E03.9 HYPOTHYROIDISM, UNSPECIFIED TYPE: ICD-10-CM

## 2024-05-08 DIAGNOSIS — E03.9 ACQUIRED HYPOTHYROIDISM: ICD-10-CM

## 2024-05-08 DIAGNOSIS — R74.8 ELEVATED LIVER ENZYMES: ICD-10-CM

## 2024-05-08 DIAGNOSIS — R73.01 IFG (IMPAIRED FASTING GLUCOSE): ICD-10-CM

## 2024-05-08 DIAGNOSIS — R53.82 CHRONIC FATIGUE: ICD-10-CM

## 2024-05-08 LAB
ALBUMIN: 4.5 G/DL (ref 3.5–5.2)
ALP BLD-CCNC: 96 U/L (ref 35–104)
ALT SERPL-CCNC: 18 U/L (ref 0–32)
ANION GAP SERPL CALCULATED.3IONS-SCNC: 17 MMOL/L (ref 7–16)
AST SERPL-CCNC: 33 U/L (ref 0–31)
BASOPHILS ABSOLUTE: 0.07 K/UL (ref 0–0.2)
BASOPHILS RELATIVE PERCENT: 1 % (ref 0–2)
BILIRUB SERPL-MCNC: 0.5 MG/DL (ref 0–1.2)
BUN BLDV-MCNC: 17 MG/DL (ref 6–23)
CALCIUM SERPL-MCNC: 10 MG/DL (ref 8.6–10.2)
CHLORIDE BLD-SCNC: 103 MMOL/L (ref 98–107)
CHOLESTEROL, TOTAL: 144 MG/DL
CO2: 22 MMOL/L (ref 22–29)
CREAT SERPL-MCNC: 1.2 MG/DL (ref 0.5–1)
EOSINOPHILS ABSOLUTE: 0.27 K/UL (ref 0.05–0.5)
EOSINOPHILS RELATIVE PERCENT: 5 % (ref 0–6)
GFR, ESTIMATED: 50 ML/MIN/1.73M2
GLUCOSE BLD-MCNC: 94 MG/DL (ref 74–99)
HCT VFR BLD CALC: 47.5 % (ref 34–48)
HDLC SERPL-MCNC: 61 MG/DL
HEMOGLOBIN: 15 G/DL (ref 11.5–15.5)
IMMATURE GRANULOCYTES %: 0 % (ref 0–5)
IMMATURE GRANULOCYTES ABSOLUTE: <0.03 K/UL (ref 0–0.58)
IRON: 74 UG/DL (ref 37–145)
LDL CHOLESTEROL: 64 MG/DL
LYMPHOCYTES ABSOLUTE: 2.08 K/UL (ref 1.5–4)
LYMPHOCYTES RELATIVE PERCENT: 36 % (ref 20–42)
MCH RBC QN AUTO: 28.8 PG (ref 26–35)
MCHC RBC AUTO-ENTMCNC: 31.6 G/DL (ref 32–34.5)
MCV RBC AUTO: 91.3 FL (ref 80–99.9)
MONOCYTES ABSOLUTE: 0.53 K/UL (ref 0.1–0.95)
MONOCYTES RELATIVE PERCENT: 9 % (ref 2–12)
NEUTROPHILS ABSOLUTE: 2.79 K/UL (ref 1.8–7.3)
NEUTROPHILS RELATIVE PERCENT: 49 % (ref 43–80)
PDW BLD-RTO: 13.9 % (ref 11.5–15)
PLATELET # BLD: 291 K/UL (ref 130–450)
PMV BLD AUTO: 9.8 FL (ref 7–12)
POTASSIUM SERPL-SCNC: 4.2 MMOL/L (ref 3.5–5)
RBC # BLD: 5.2 M/UL (ref 3.5–5.5)
SODIUM BLD-SCNC: 142 MMOL/L (ref 132–146)
T4 FREE: 1.1 NG/DL (ref 0.9–1.7)
TOTAL PROTEIN: 7.8 G/DL (ref 6.4–8.3)
TRIGL SERPL-MCNC: 97 MG/DL
TSH SERPL DL<=0.05 MIU/L-ACNC: 3.44 UIU/ML (ref 0.27–4.2)
VITAMIN D 25-HYDROXY: 40.6 NG/ML (ref 30–100)
VLDLC SERPL CALC-MCNC: 19 MG/DL
WBC # BLD: 5.8 K/UL (ref 4.5–11.5)

## 2024-05-08 PROCEDURE — 36415 COLL VENOUS BLD VENIPUNCTURE: CPT | Performed by: FAMILY MEDICINE

## 2024-05-09 LAB — HBA1C MFR BLD: 5.4 % (ref 4–5.6)

## 2024-05-10 ENCOUNTER — OFFICE VISIT (OUTPATIENT)
Dept: FAMILY MEDICINE CLINIC | Age: 74
End: 2024-05-10
Payer: MEDICARE

## 2024-05-10 VITALS
DIASTOLIC BLOOD PRESSURE: 74 MMHG | TEMPERATURE: 98.6 F | HEART RATE: 62 BPM | SYSTOLIC BLOOD PRESSURE: 122 MMHG | OXYGEN SATURATION: 96 % | WEIGHT: 173.8 LBS | RESPIRATION RATE: 16 BRPM | BODY MASS INDEX: 32.81 KG/M2 | HEIGHT: 61 IN

## 2024-05-10 DIAGNOSIS — R79.89 ELEVATED SERUM CREATININE: ICD-10-CM

## 2024-05-10 DIAGNOSIS — M25.569 KNEE PAIN, UNSPECIFIED CHRONICITY, UNSPECIFIED LATERALITY: ICD-10-CM

## 2024-05-10 DIAGNOSIS — E78.5 HYPERLIPIDEMIA, UNSPECIFIED HYPERLIPIDEMIA TYPE: ICD-10-CM

## 2024-05-10 DIAGNOSIS — R74.8 ELEVATED LIVER ENZYMES: ICD-10-CM

## 2024-05-10 DIAGNOSIS — R53.82 CHRONIC FATIGUE: ICD-10-CM

## 2024-05-10 DIAGNOSIS — H57.89 MASS OF EYE, LEFT: ICD-10-CM

## 2024-05-10 DIAGNOSIS — E78.2 MIXED HYPERLIPIDEMIA: ICD-10-CM

## 2024-05-10 DIAGNOSIS — I10 ESSENTIAL HYPERTENSION: Primary | ICD-10-CM

## 2024-05-10 DIAGNOSIS — R05.9 COUGH, UNSPECIFIED TYPE: ICD-10-CM

## 2024-05-10 DIAGNOSIS — R74.8 LIVER ENZYME ELEVATION: ICD-10-CM

## 2024-05-10 PROCEDURE — 99214 OFFICE O/P EST MOD 30 MIN: CPT | Performed by: FAMILY MEDICINE

## 2024-05-10 PROCEDURE — 1123F ACP DISCUSS/DSCN MKR DOCD: CPT | Performed by: FAMILY MEDICINE

## 2024-05-10 PROCEDURE — 3078F DIAST BP <80 MM HG: CPT | Performed by: FAMILY MEDICINE

## 2024-05-10 PROCEDURE — 3074F SYST BP LT 130 MM HG: CPT | Performed by: FAMILY MEDICINE

## 2024-05-10 RX ORDER — METHYLPREDNISOLONE 4 MG/1
TABLET ORAL
Qty: 1 KIT | Refills: 0 | Status: SHIPPED | OUTPATIENT
Start: 2024-05-10 | End: 2024-05-16

## 2024-05-10 RX ORDER — DOXYCYCLINE HYCLATE 100 MG
100 TABLET ORAL 2 TIMES DAILY
Qty: 20 TABLET | Refills: 0 | Status: SHIPPED | OUTPATIENT
Start: 2024-05-10 | End: 2024-05-20

## 2024-05-10 NOTE — PROGRESS NOTES
Hematological:  Negative for adenopathy. Does not bruise/bleed easily.   Psychiatric/Behavioral:  Negative for agitation, behavioral problems, confusion, decreased concentration, hallucinations, self-injury, sleep disturbance and suicidal ideas. The patient is not nervous/anxious and is not hyperactive.          Physical Exam  Vitals and nursing note reviewed.   Constitutional:       General: She is not in acute distress.     Appearance: Normal appearance. She is normal weight. She is not ill-appearing, toxic-appearing or diaphoretic.   HENT:      Head: Normocephalic and atraumatic.      Comments: Left side facial swelling     Right Ear: Tympanic membrane, ear canal and external ear normal. There is no impacted cerumen.      Left Ear: External ear normal. There is no impacted cerumen.      Nose: Nose normal. No congestion or rhinorrhea.      Comments: Inflammation of the left nare     Mouth/Throat:      Mouth: Mucous membranes are moist.      Pharynx: Oropharynx is clear. No oropharyngeal exudate or posterior oropharyngeal erythema.   Eyes:      General: No scleral icterus.        Right eye: No discharge.         Left eye: No discharge.      Extraocular Movements: Extraocular movements intact.      Conjunctiva/sclera: Conjunctivae normal.      Pupils: Pupils are equal, round, and reactive to light.   Neck:      Vascular: No carotid bruit.   Cardiovascular:      Rate and Rhythm: Normal rate and regular rhythm.      Pulses: Normal pulses.      Heart sounds: Normal heart sounds. No murmur heard.     No friction rub. No gallop.   Pulmonary:      Effort: Pulmonary effort is normal. No respiratory distress.      Breath sounds: No stridor. No wheezing, rhonchi or rales.   Chest:      Chest wall: No tenderness.   Abdominal:      General: Abdomen is flat. Bowel sounds are normal. There is no distension.      Palpations: Abdomen is soft. There is no mass.      Tenderness: There is no abdominal tenderness. There is no right

## 2024-05-21 ASSESSMENT — ENCOUNTER SYMPTOMS
NAUSEA: 0
BLOOD IN STOOL: 0
COUGH: 1
BACK PAIN: 0
APNEA: 0
DIARRHEA: 0
STRIDOR: 0
CHOKING: 0
VOICE CHANGE: 0
ANAL BLEEDING: 0
EYE ITCHING: 0
RECTAL PAIN: 0
WHEEZING: 0
VOMITING: 0
CONSTIPATION: 0
COLOR CHANGE: 0
FACIAL SWELLING: 0
ABDOMINAL DISTENTION: 0
EYE DISCHARGE: 0
CHEST TIGHTNESS: 0
RHINORRHEA: 0
EYE REDNESS: 0
PHOTOPHOBIA: 0
ABDOMINAL PAIN: 0
SHORTNESS OF BREATH: 0
EYE PAIN: 0
TROUBLE SWALLOWING: 0

## 2024-05-28 ENCOUNTER — TELEPHONE (OUTPATIENT)
Dept: FAMILY MEDICINE CLINIC | Age: 74
End: 2024-05-28

## 2024-05-28 NOTE — TELEPHONE ENCOUNTER
Patient states that she saw you for a rash by her nasal passages and you gave her an antibiotic and told her that if it did not go away to call you. She states that once the antibiotics were done it returned so she wanted to see what you thought she should do now ?

## 2024-06-07 RX ORDER — ROSUVASTATIN CALCIUM 10 MG/1
10 TABLET, COATED ORAL DAILY
Qty: 90 TABLET | Refills: 1 | Status: SHIPPED | OUTPATIENT
Start: 2024-06-07

## 2024-06-07 NOTE — TELEPHONE ENCOUNTER
Patients last appointment 5/10/2024.  Patients next scheduled appointment   Future Appointments   Date Time Provider Department Center   7/10/2024  8:05 AM SCHEDULE, RHONA ZUNIGA [facility]   8/5/2024  9:00 AM Jamie Castanon DO E. [facility]   2/21/2025 10:00 AM Jamie Castanon DO E. [facility]

## 2024-07-03 ENCOUNTER — OFFICE VISIT (OUTPATIENT)
Age: 74
End: 2024-07-03
Payer: MEDICARE

## 2024-07-03 VITALS
OXYGEN SATURATION: 98 % | BODY MASS INDEX: 32.66 KG/M2 | DIASTOLIC BLOOD PRESSURE: 70 MMHG | TEMPERATURE: 98.2 F | HEIGHT: 61 IN | HEART RATE: 77 BPM | SYSTOLIC BLOOD PRESSURE: 128 MMHG | WEIGHT: 173 LBS | RESPIRATION RATE: 18 BRPM

## 2024-07-03 DIAGNOSIS — U07.1 COVID-19 VIRUS INFECTION: Primary | ICD-10-CM

## 2024-07-03 DIAGNOSIS — R05.9 COUGH, UNSPECIFIED TYPE: ICD-10-CM

## 2024-07-03 DIAGNOSIS — R53.83 OTHER FATIGUE: ICD-10-CM

## 2024-07-03 LAB
INFLUENZA A ANTIGEN, POC: NORMAL
INFLUENZA B ANTIGEN, POC: NEGATIVE
Lab: ABNORMAL
PERFORMING INSTRUMENT: ABNORMAL
QC PASS/FAIL: ABNORMAL
SARS-COV-2, POC: DETECTED

## 2024-07-03 PROCEDURE — 3074F SYST BP LT 130 MM HG: CPT | Performed by: NURSE PRACTITIONER

## 2024-07-03 PROCEDURE — 99213 OFFICE O/P EST LOW 20 MIN: CPT | Performed by: NURSE PRACTITIONER

## 2024-07-03 PROCEDURE — 3078F DIAST BP <80 MM HG: CPT | Performed by: NURSE PRACTITIONER

## 2024-07-03 PROCEDURE — 1123F ACP DISCUSS/DSCN MKR DOCD: CPT | Performed by: NURSE PRACTITIONER

## 2024-07-03 PROCEDURE — 87426 SARSCOV CORONAVIRUS AG IA: CPT | Performed by: NURSE PRACTITIONER

## 2024-07-03 PROCEDURE — 87804 INFLUENZA ASSAY W/OPTIC: CPT | Performed by: NURSE PRACTITIONER

## 2024-07-03 NOTE — PROGRESS NOTES
Chief Complaint:   Headache (Started 2 days ago with a headache ), Sinusitis, Cough, Congestion, Chest Congestion, and Fatigue      History of Present Illness   Source of history provided by:  patient.     Purnima Rondon is a 74 y.o. old female who presents to primary care for evaluation of sinus pressure, nasal congestion, discolored nasal drainage, bilateral ear pressure, mild productive of white sputumproductive cough and sore throat x 3 days. Has been taking medications containing pseudoephedrine or phenylephrine, such as Sudafed OTC without relief. Denies any fever, chills, wheezing, CP, SOB, or GI symptoms. denies any hx of asthma, COPD, or tobacco use.    Review of Systems   Unless otherwise stated in this report or unable to obtain because of the patient's clinical or mental status as evidenced by the medical record, this patients's positive and negative responses for Review of Systems, constitutional, psych, eyes, ENT, cardiovascular, respiratory, gastrointestinal, neurological, genitourinary, musculoskeletal, integument systems and systems related to the presenting problem are either stated in the preceding or were negative for the symptoms and/or complaints related to the medical problem.    Past Medical History:  has a past medical history of AC (acromioclavicular) arthritis, Anxiety, Diverticulitis, DJD (degenerative joint disease), Esophagitis, GERD (gastroesophageal reflux disease), Headache, Hiatal hernia, Hyperlipidemia, Hypertension, Hypothyroid, Hypothyroid, IBS (irritable bowel syndrome), Impingement syndrome of right shoulder, PONV (postoperative nausea and vomiting), Pre-operative clearance, and SDH (subdural hematoma) ([facility]).   Past Surgical History:  has a past surgical history that includes Tonsillectomy; fracture surgery; Abdomen surgery; Shoulder arthroscopy (Right, 12/18/2015); Bunionectomy (Bilateral); vascular surgery; Ovary removal (Bilateral); Breast reduction surgery;

## 2024-07-12 ENCOUNTER — OFFICE VISIT (OUTPATIENT)
Dept: FAMILY MEDICINE CLINIC | Age: 74
End: 2024-07-12
Payer: MEDICARE

## 2024-07-12 VITALS
DIASTOLIC BLOOD PRESSURE: 82 MMHG | TEMPERATURE: 97.6 F | SYSTOLIC BLOOD PRESSURE: 134 MMHG | OXYGEN SATURATION: 99 % | HEART RATE: 59 BPM | RESPIRATION RATE: 16 BRPM

## 2024-07-12 DIAGNOSIS — R53.82 CHRONIC FATIGUE: ICD-10-CM

## 2024-07-12 DIAGNOSIS — I10 ESSENTIAL HYPERTENSION: ICD-10-CM

## 2024-07-12 DIAGNOSIS — U07.1 COVID-19 VIRUS INFECTION: Primary | ICD-10-CM

## 2024-07-12 DIAGNOSIS — E78.5 HYPERLIPIDEMIA, UNSPECIFIED HYPERLIPIDEMIA TYPE: ICD-10-CM

## 2024-07-12 DIAGNOSIS — E78.2 MIXED HYPERLIPIDEMIA: ICD-10-CM

## 2024-07-12 DIAGNOSIS — R74.8 LIVER ENZYME ELEVATION: ICD-10-CM

## 2024-07-12 DIAGNOSIS — R74.8 ELEVATED LIVER ENZYMES: ICD-10-CM

## 2024-07-12 DIAGNOSIS — R79.89 ELEVATED SERUM CREATININE: ICD-10-CM

## 2024-07-12 LAB
ALBUMIN: 4.1 G/DL (ref 3.5–5.2)
ALP BLD-CCNC: 97 U/L (ref 35–104)
ALT SERPL-CCNC: 26 U/L (ref 0–32)
ANION GAP SERPL CALCULATED.3IONS-SCNC: 12 MMOL/L (ref 7–16)
AST SERPL-CCNC: 25 U/L (ref 0–31)
BASOPHILS ABSOLUTE: 0.05 K/UL (ref 0–0.2)
BASOPHILS RELATIVE PERCENT: 1 % (ref 0–2)
BILIRUB SERPL-MCNC: 0.4 MG/DL (ref 0–1.2)
BUN BLDV-MCNC: 11 MG/DL (ref 6–23)
CALCIUM SERPL-MCNC: 9.9 MG/DL (ref 8.6–10.2)
CHLORIDE BLD-SCNC: 104 MMOL/L (ref 98–107)
CO2: 26 MMOL/L (ref 22–29)
CREAT SERPL-MCNC: 0.9 MG/DL (ref 0.5–1)
EOSINOPHILS ABSOLUTE: 0.18 K/UL (ref 0.05–0.5)
EOSINOPHILS RELATIVE PERCENT: 3 % (ref 0–6)
GFR, ESTIMATED: 66 ML/MIN/1.73M2
GLUCOSE BLD-MCNC: 92 MG/DL (ref 74–99)
HCT VFR BLD CALC: 46.3 % (ref 34–48)
HEMOGLOBIN: 14.7 G/DL (ref 11.5–15.5)
IMMATURE GRANULOCYTES %: 0 % (ref 0–5)
IMMATURE GRANULOCYTES ABSOLUTE: <0.03 K/UL (ref 0–0.58)
LYMPHOCYTES ABSOLUTE: 2.32 K/UL (ref 1.5–4)
LYMPHOCYTES RELATIVE PERCENT: 44 % (ref 20–42)
MCH RBC QN AUTO: 28.8 PG (ref 26–35)
MCHC RBC AUTO-ENTMCNC: 31.7 G/DL (ref 32–34.5)
MCV RBC AUTO: 90.8 FL (ref 80–99.9)
MONOCYTES ABSOLUTE: 0.55 K/UL (ref 0.1–0.95)
MONOCYTES RELATIVE PERCENT: 10 % (ref 2–12)
NEUTROPHILS ABSOLUTE: 2.18 K/UL (ref 1.8–7.3)
NEUTROPHILS RELATIVE PERCENT: 41 % (ref 43–80)
PDW BLD-RTO: 14.2 % (ref 11.5–15)
PLATELET # BLD: 259 K/UL (ref 130–450)
PMV BLD AUTO: 10.3 FL (ref 7–12)
POTASSIUM SERPL-SCNC: 4.5 MMOL/L (ref 3.5–5)
RBC # BLD: 5.1 M/UL (ref 3.5–5.5)
SODIUM BLD-SCNC: 142 MMOL/L (ref 132–146)
TOTAL PROTEIN: 7.2 G/DL (ref 6.4–8.3)
WBC # BLD: 5.3 K/UL (ref 4.5–11.5)

## 2024-07-12 PROCEDURE — 3079F DIAST BP 80-89 MM HG: CPT | Performed by: NURSE PRACTITIONER

## 2024-07-12 PROCEDURE — 99213 OFFICE O/P EST LOW 20 MIN: CPT | Performed by: NURSE PRACTITIONER

## 2024-07-12 PROCEDURE — 36415 COLL VENOUS BLD VENIPUNCTURE: CPT | Performed by: NURSE PRACTITIONER

## 2024-07-12 PROCEDURE — 3075F SYST BP GE 130 - 139MM HG: CPT | Performed by: NURSE PRACTITIONER

## 2024-07-12 PROCEDURE — 1123F ACP DISCUSS/DSCN MKR DOCD: CPT | Performed by: NURSE PRACTITIONER

## 2024-07-12 RX ORDER — METHYLPREDNISOLONE 4 MG/1
TABLET ORAL
Qty: 1 KIT | Refills: 0 | Status: SHIPPED | OUTPATIENT
Start: 2024-07-12 | End: 2024-07-18

## 2024-07-12 ASSESSMENT — ENCOUNTER SYMPTOMS
ABDOMINAL DISTENTION: 0
SHORTNESS OF BREATH: 0
CONSTIPATION: 0
SINUS PAIN: 0
BACK PAIN: 0
STRIDOR: 0
PHOTOPHOBIA: 0
VOMITING: 0
RECTAL PAIN: 0
DIARRHEA: 0
BLOOD IN STOOL: 0
EYE PAIN: 0
CHOKING: 0
CHEST TIGHTNESS: 0
ANAL BLEEDING: 0
COUGH: 1
COLOR CHANGE: 0
EYE ITCHING: 0
RHINORRHEA: 0
SINUS PRESSURE: 0
EYE DISCHARGE: 0
SORE THROAT: 0
NAUSEA: 0
ABDOMINAL PAIN: 0
APNEA: 0
VOICE CHANGE: 0
FACIAL SWELLING: 0
EYE REDNESS: 0
WHEEZING: 0
TROUBLE SWALLOWING: 0
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
## 2024-08-05 ENCOUNTER — OFFICE VISIT (OUTPATIENT)
Dept: FAMILY MEDICINE CLINIC | Age: 74
End: 2024-08-05
Payer: MEDICARE

## 2024-08-05 VITALS
HEIGHT: 61 IN | DIASTOLIC BLOOD PRESSURE: 68 MMHG | BODY MASS INDEX: 31.91 KG/M2 | TEMPERATURE: 97.1 F | OXYGEN SATURATION: 97 % | RESPIRATION RATE: 16 BRPM | WEIGHT: 169 LBS | HEART RATE: 60 BPM | SYSTOLIC BLOOD PRESSURE: 120 MMHG

## 2024-08-05 DIAGNOSIS — F41.9 ANXIETY: ICD-10-CM

## 2024-08-05 DIAGNOSIS — D64.9 ANEMIA, UNSPECIFIED TYPE: ICD-10-CM

## 2024-08-05 DIAGNOSIS — M25.569 KNEE PAIN, UNSPECIFIED CHRONICITY, UNSPECIFIED LATERALITY: ICD-10-CM

## 2024-08-05 DIAGNOSIS — E03.9 HYPOTHYROIDISM, UNSPECIFIED TYPE: Primary | ICD-10-CM

## 2024-08-05 DIAGNOSIS — K21.9 GASTROESOPHAGEAL REFLUX DISEASE WITHOUT ESOPHAGITIS: ICD-10-CM

## 2024-08-05 DIAGNOSIS — R53.83 OTHER FATIGUE: ICD-10-CM

## 2024-08-05 DIAGNOSIS — H57.89 MASS OF EYE, LEFT: ICD-10-CM

## 2024-08-05 DIAGNOSIS — E03.9 HYPOTHYROIDISM, UNSPECIFIED TYPE: ICD-10-CM

## 2024-08-05 DIAGNOSIS — R73.01 IFG (IMPAIRED FASTING GLUCOSE): ICD-10-CM

## 2024-08-05 DIAGNOSIS — E78.2 MIXED HYPERLIPIDEMIA: ICD-10-CM

## 2024-08-05 DIAGNOSIS — E55.9 VITAMIN D DEFICIENCY: ICD-10-CM

## 2024-08-05 DIAGNOSIS — R74.8 LIVER ENZYME ELEVATION: ICD-10-CM

## 2024-08-05 DIAGNOSIS — I10 ESSENTIAL HYPERTENSION: ICD-10-CM

## 2024-08-05 PROCEDURE — 3074F SYST BP LT 130 MM HG: CPT | Performed by: FAMILY MEDICINE

## 2024-08-05 PROCEDURE — 36415 COLL VENOUS BLD VENIPUNCTURE: CPT | Performed by: FAMILY MEDICINE

## 2024-08-05 PROCEDURE — 3078F DIAST BP <80 MM HG: CPT | Performed by: FAMILY MEDICINE

## 2024-08-05 PROCEDURE — 99213 OFFICE O/P EST LOW 20 MIN: CPT | Performed by: FAMILY MEDICINE

## 2024-08-05 PROCEDURE — 1123F ACP DISCUSS/DSCN MKR DOCD: CPT | Performed by: FAMILY MEDICINE

## 2024-08-05 RX ORDER — TRIAMTERENE/HYDROCHLOROTHIAZID 37.5-25 MG
1 TABLET ORAL DAILY
Qty: 90 TABLET | Refills: 1 | Status: SHIPPED | OUTPATIENT
Start: 2024-08-05

## 2024-08-05 RX ORDER — PANTOPRAZOLE SODIUM 40 MG/1
40 TABLET, DELAYED RELEASE ORAL DAILY
Qty: 90 TABLET | Refills: 1 | Status: SHIPPED | OUTPATIENT
Start: 2024-08-05 | End: 2024-11-03

## 2024-08-05 RX ORDER — AMLODIPINE BESYLATE 5 MG/1
5 TABLET ORAL DAILY
Qty: 90 TABLET | Refills: 1 | Status: SHIPPED | OUTPATIENT
Start: 2024-08-05

## 2024-08-05 RX ORDER — ALPRAZOLAM 1 MG
1 TABLET ORAL 2 TIMES DAILY PRN
Qty: 60 TABLET | Refills: 1 | Status: SHIPPED | OUTPATIENT
Start: 2024-08-05 | End: 2024-09-04

## 2024-08-05 RX ORDER — SERTRALINE HYDROCHLORIDE 25 MG/1
25 TABLET, FILM COATED ORAL DAILY
Qty: 90 TABLET | Refills: 1 | Status: SHIPPED | OUTPATIENT
Start: 2024-08-05

## 2024-08-05 NOTE — PROGRESS NOTES
24  Purnima Rondon : 1950 Sex: female  Age: 74 y.o.    Chief Complaint   Patient presents with    3 Month Follow-Up    Hypertension       HPI  overall check up       Review of Systems   Constitutional:  Negative for activity change, appetite change, chills, diaphoresis, fatigue, fever and unexpected weight change.        Had covid 1 month ago    HENT:  Positive for congestion. Negative for dental problem, drooling, ear discharge, facial swelling, hearing loss, mouth sores, nosebleeds, postnasal drip, rhinorrhea, sneezing, tinnitus, trouble swallowing and voice change. Sinus pressure: on and off.   Eyes:  Negative for photophobia, pain, discharge, redness, itching and visual disturbance.   Respiratory:  Positive for cough. Negative for apnea, choking, chest tightness, shortness of breath, wheezing and stridor.    Cardiovascular:  Negative for chest pain, palpitations and leg swelling.   Gastrointestinal:  Negative for abdominal distention, abdominal pain, anal bleeding, blood in stool, constipation, diarrhea, nausea, rectal pain and vomiting.   Endocrine: Negative for cold intolerance, heat intolerance, polydipsia, polyphagia and polyuria.   Genitourinary:  Negative for decreased urine volume, difficulty urinating, dysuria, enuresis, flank pain, frequency, genital sores, hematuria and urgency.   Musculoskeletal:  Positive for arthralgias (left shlouder slight  swellinglaterally  and pain no redness or heat). Negative for back pain, gait problem, joint swelling, myalgias, neck pain and neck stiffness.   Skin:  Negative for color change, pallor, rash and wound.        Nasal and left mouth area    Allergic/Immunologic: Negative for environmental allergies, food allergies and immunocompromised state.   Neurological:  Positive for headaches. Negative for dizziness, tremors, seizures, syncope, facial asymmetry, speech difficulty, weakness, light-headedness and numbness.   Hematological:  Negative for

## 2024-08-06 LAB
T4 FREE: 1.1 NG/DL (ref 0.9–1.7)
TSH SERPL DL<=0.05 MIU/L-ACNC: 2.7 UIU/ML (ref 0.27–4.2)

## 2024-08-11 ENCOUNTER — HOSPITAL ENCOUNTER (EMERGENCY)
Age: 74
Discharge: HOME | End: 2024-08-11
Payer: MEDICARE

## 2024-08-11 ENCOUNTER — APPOINTMENT (OUTPATIENT)
Dept: GENERAL RADIOLOGY | Age: 74
End: 2024-08-11
Payer: MEDICARE

## 2024-08-11 ENCOUNTER — APPOINTMENT (OUTPATIENT)
Dept: CT IMAGING | Age: 74
End: 2024-08-11
Payer: MEDICARE

## 2024-08-11 VITALS
TEMPERATURE: 98 F | OXYGEN SATURATION: 96 % | DIASTOLIC BLOOD PRESSURE: 65 MMHG | RESPIRATION RATE: 18 BRPM | SYSTOLIC BLOOD PRESSURE: 138 MMHG | HEART RATE: 67 BPM

## 2024-08-11 DIAGNOSIS — S00.83XA CONTUSION OF FACE, INITIAL ENCOUNTER: ICD-10-CM

## 2024-08-11 DIAGNOSIS — S20.212A CONTUSION OF LEFT CHEST WALL, INITIAL ENCOUNTER: ICD-10-CM
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
## 2024-08-11 DIAGNOSIS — S60.211A CONTUSION OF RIGHT WRIST, INITIAL ENCOUNTER: ICD-10-CM

## 2024-08-11 DIAGNOSIS — S09.90XA INJURY OF HEAD, INITIAL ENCOUNTER: Primary | ICD-10-CM

## 2024-08-11 PROCEDURE — 70486 CT MAXILLOFACIAL W/O DYE: CPT

## 2024-08-11 PROCEDURE — 6370000000 HC RX 637 (ALT 250 FOR IP): Performed by: NURSE PRACTITIONER

## 2024-08-11 PROCEDURE — 72125 CT NECK SPINE W/O DYE: CPT

## 2024-08-11 PROCEDURE — 70450 CT HEAD/BRAIN W/O DYE: CPT

## 2024-08-11 PROCEDURE — 73110 X-RAY EXAM OF WRIST: CPT

## 2024-08-11 PROCEDURE — 99284 EMERGENCY DEPT VISIT MOD MDM: CPT

## 2024-08-11 PROCEDURE — 71046 X-RAY EXAM CHEST 2 VIEWS: CPT

## 2024-08-11 RX ORDER — BACITRACIN ZINC 500 [USP'U]/G
OINTMENT TOPICAL ONCE
Status: COMPLETED | OUTPATIENT
Start: 2024-08-11 | End: 2024-08-11

## 2024-08-11 RX ORDER — ACETAMINOPHEN 500 MG
1000 TABLET ORAL ONCE
Status: COMPLETED | OUTPATIENT
Start: 2024-08-11 | End: 2024-08-11

## 2024-08-11 RX ORDER — IBUPROFEN 800 MG/1
800 TABLET ORAL EVERY 6 HOURS PRN
Qty: 21 TABLET | Refills: 0 | Status: SHIPPED | OUTPATIENT
Start: 2024-08-11

## 2024-08-11 RX ADMIN — ACETAMINOPHEN 1000 MG: 500 TABLET, FILM COATED ORAL at 13:30

## 2024-08-11 RX ADMIN — BACITRACIN ZINC: 500 OINTMENT TOPICAL at 13:31

## 2024-08-11 ASSESSMENT — PAIN SCALES - GENERAL
PAINLEVEL_OUTOF10: 5
PAINLEVEL_OUTOF10: 8

## 2024-08-11 ASSESSMENT — PAIN - FUNCTIONAL ASSESSMENT: PAIN_FUNCTIONAL_ASSESSMENT: 0-10

## 2024-08-11 ASSESSMENT — PAIN DESCRIPTION - ORIENTATION: ORIENTATION: RIGHT

## 2024-08-11 ASSESSMENT — PAIN DESCRIPTION - LOCATION: LOCATION: FACE;WRIST

## 2024-08-11 NOTE — ED PROVIDER NOTES
[facility] EMERGENCY DEPARTMENT  ED  Encounter Note  Admit Date/RoomTime: 8/11/2024  1:09 PM  ED Room: PR1/PR1     Independent [facility] Visit.  HPI:  8/11/24,   Time: 3:29 PM EDT         Purnima Rondon is a 74 y.o. female presenting to the ED for fall and facial injury, beginning prior to arrival ago.  The complaint has been persistent, mild in severity, and worsened by nothing.  Patient presents by EMS after she had a mechanical fall tripped over the curb while at a [employer] prior to arrival.  States that she fell onto the left wrist and smacked her face on the ground.  She is on no anticoagulants.  She denies any loss of conscious.  She does have abrasion to the bridge of the nose as well as a superficial abrasion to the chin and upper lip area.  The teeth appear to be intact with no evidence of malocclusion.  She has no pain at the left wrist.    ROS:   Pertinent positives and negatives are stated within HPI, all other systems reviewed and are negative.  --------------------------------------------- PAST HISTORY ---------------------------------------------  Past Medical History:  has a past medical history of AC (acromioclavicular) arthritis, Anxiety, Diverticulitis, DJD (degenerative joint disease), Esophagitis, GERD (gastroesophageal reflux disease), Headache, Hiatal hernia, Hyperlipidemia, Hypertension, Hypothyroid, Hypothyroid, IBS (irritable bowel syndrome), Impingement syndrome of right shoulder, PONV (postoperative nausea and vomiting), Pre-operative clearance, and SDH (subdural hematoma) ([facility]).    Past Surgical History:  has a past surgical history that includes Tonsillectomy; fracture surgery; Abdomen surgery; Shoulder arthroscopy (Right, 12/18/2015); Bunionectomy (Bilateral); vascular surgery; Ovary removal (Bilateral); Breast reduction surgery; Abdominoplasty; Brain AVM surgery (Left); rhinoplasty; Colonoscopy; Upper gastrointestinal endoscopy; Shoulder

## 2024-08-14 ENCOUNTER — OFFICE VISIT (OUTPATIENT)
Dept: FAMILY MEDICINE CLINIC | Age: 74
End: 2024-08-14
Payer: MEDICARE

## 2024-08-14 VITALS
WEIGHT: 169 LBS | OXYGEN SATURATION: 98 % | DIASTOLIC BLOOD PRESSURE: 76 MMHG | HEART RATE: 55 BPM | BODY MASS INDEX: 29.95 KG/M2 | SYSTOLIC BLOOD PRESSURE: 118 MMHG | RESPIRATION RATE: 16 BRPM | HEIGHT: 63 IN | TEMPERATURE: 97.7 F

## 2024-08-14 DIAGNOSIS — S66.919A SPRAIN AND STRAIN OF WRIST: Primary | ICD-10-CM

## 2024-08-14 DIAGNOSIS — T07.XXXA MULTIPLE CONTUSIONS: ICD-10-CM

## 2024-08-14 DIAGNOSIS — S63.509A SPRAIN AND STRAIN OF WRIST: Primary | ICD-10-CM

## 2024-08-14 PROCEDURE — 3074F SYST BP LT 130 MM HG: CPT | Performed by: NURSE PRACTITIONER

## 2024-08-14 PROCEDURE — 3078F DIAST BP <80 MM HG: CPT | Performed by: NURSE PRACTITIONER

## 2024-08-14 PROCEDURE — 99213 OFFICE O/P EST LOW 20 MIN: CPT | Performed by: NURSE PRACTITIONER

## 2024-08-14 PROCEDURE — 1123F ACP DISCUSS/DSCN MKR DOCD: CPT | Performed by: NURSE PRACTITIONER

## 2024-08-14 ASSESSMENT — ENCOUNTER SYMPTOMS
COUGH: 0
APNEA: 0
SINUS PRESSURE: 0
SORE THROAT: 0
ABDOMINAL PAIN: 0
BLOOD IN STOOL: 0
VOICE CHANGE: 0
EYE REDNESS: 0
SINUS PAIN: 0
COLOR CHANGE: 0
VOMITING: 0
BACK PAIN: 0
ABDOMINAL DISTENTION: 0
EYE PAIN: 0
PHOTOPHOBIA: 0
TROUBLE SWALLOWING: 0
CHEST TIGHTNESS: 0
WHEEZING: 0
RHINORRHEA: 0
DIARRHEA: 0
FACIAL SWELLING: 0
EYE DISCHARGE: 0
STRIDOR: 0
ANAL BLEEDING: 0
EYE ITCHING: 0
SHORTNESS OF BREATH: 0
RECTAL PAIN: 0
CONSTIPATION: 0
NAUSEA: 0
CHOKING: 0

## 2024-08-14 NOTE — PROGRESS NOTES
Palpations: Abdomen is soft. There is no mass.      Tenderness: There is no abdominal tenderness. There is no right CVA tenderness, left CVA tenderness, guarding or rebound.      Hernia: No hernia is present.   Musculoskeletal:         General: Tenderness (wrists and knees) present. No swelling, deformity or signs of injury. Normal range of motion.      Cervical back: Normal range of motion and neck supple. No rigidity. No muscular tenderness.      Right lower leg: No edema.      Left lower leg: No edema.   Lymphadenopathy:      Cervical: No cervical adenopathy.   Skin:     General: Skin is warm and dry.      Capillary Refill: Capillary refill takes less than 2 seconds.      Coloration: Skin is not jaundiced or pale.      Findings: Bruising present. No erythema, lesion or rash.   Neurological:      General: No focal deficit present.      Mental Status: She is alert and oriented to person, place, and time. Mental status is at baseline.      Cranial Nerves: No cranial nerve deficit.      Sensory: No sensory deficit.      Motor: No weakness.      Coordination: Coordination normal.      Gait: Gait normal.      Deep Tendon Reflexes: Reflexes normal.   Psychiatric:         Mood and Affect: Mood normal.         Behavior: Behavior normal.         Thought Content: Thought content normal.         Judgment: Judgment normal.         Assessment and Plan:  Purnima was seen today for follow-up.    Diagnoses and all orders for this visit:    Sprain and strain of wrist    Multiple contusions        Discussions/Education provided to patients during visit:  [] Discussed the importance to stop smoking.  [] Advised to monitor eating habits.  [] Reviewed and discussed Imaging results.  [] Reviewed and discussed Lab results.  [] Discussed the importance of drinking plenty of fluids.  [] Cut down on Salt and Caffeine.  [] Exercise 2-3 times weekly, if not more.  [] Cut down on Sugar and Fats.  [x] Continue Medications as Discussed.  [x]

## 2024-09-17 ENCOUNTER — TELEPHONE (OUTPATIENT)
Dept: FAMILY MEDICINE CLINIC | Age: 74
End: 2024-09-17

## 2024-09-17 RX ORDER — METHYLPREDNISOLONE 4 MG
TABLET, DOSE PACK ORAL
Qty: 1 KIT | Refills: 0 | Status: SHIPPED | OUTPATIENT
Start: 2024-09-17 | End: 2024-09-23

## 2024-09-17 RX ORDER — AZITHROMYCIN 250 MG/1
TABLET, FILM COATED ORAL
Qty: 6 TABLET | Refills: 0 | Status: SHIPPED | OUTPATIENT
Start: 2024-09-17 | End: 2024-09-27

## 2024-09-18 ENCOUNTER — OFFICE VISIT (OUTPATIENT)
Age: 74
End: 2024-09-18
Payer: MEDICARE

## 2024-09-18 VITALS
TEMPERATURE: 97.9 F | DIASTOLIC BLOOD PRESSURE: 72 MMHG | RESPIRATION RATE: 18 BRPM | SYSTOLIC BLOOD PRESSURE: 122 MMHG | HEART RATE: 68 BPM | WEIGHT: 169 LBS | OXYGEN SATURATION: 98 % | BODY MASS INDEX: 29.95 KG/M2 | HEIGHT: 63 IN

## 2024-09-18 DIAGNOSIS — J01.90 ACUTE BACTERIAL SINUSITIS: ICD-10-CM

## 2024-09-18 DIAGNOSIS — R91.1 LUNG NODULE: ICD-10-CM

## 2024-09-18 DIAGNOSIS — B96.89 ACUTE BACTERIAL SINUSITIS: ICD-10-CM

## 2024-09-18 DIAGNOSIS — R05.1 ACUTE COUGH: Primary | ICD-10-CM

## 2024-09-18 DIAGNOSIS — J98.11 ATELECTASIS, BILATERAL: ICD-10-CM

## 2024-09-18 DIAGNOSIS — J21.8 ACUTE BRONCHIOLITIS DUE TO OTHER SPECIFIED ORGANISMS: ICD-10-CM

## 2024-09-18 PROCEDURE — 99213 OFFICE O/P EST LOW 20 MIN: CPT | Performed by: NURSE PRACTITIONER

## 2024-09-18 PROCEDURE — 3078F DIAST BP <80 MM HG: CPT | Performed by: NURSE PRACTITIONER

## 2024-09-18 PROCEDURE — 1123F ACP DISCUSS/DSCN MKR DOCD: CPT | Performed by: NURSE PRACTITIONER

## 2024-09-18 PROCEDURE — 3074F SYST BP LT 130 MM HG: CPT | Performed by: NURSE PRACTITIONER

## 2024-09-18 RX ORDER — PREDNISONE 10 MG/1
10 TABLET ORAL 2 TIMES DAILY
Qty: 10 TABLET | Refills: 0 | Status: SHIPPED | OUTPATIENT
Start: 2024-09-18 | End: 2024-09-23

## 2024-09-18 RX ORDER — BENZONATATE 200 MG/1
200 CAPSULE ORAL 3 TIMES DAILY PRN
Qty: 30 CAPSULE | Refills: 0 | Status: SHIPPED | OUTPATIENT
Start: 2024-09-18 | End: 2024-09-25

## 2024-10-09 RX ORDER — SERTRALINE HYDROCHLORIDE 25 MG/1
25 TABLET, FILM COATED ORAL DAILY
Qty: 90 TABLET | Refills: 1 | Status: SHIPPED | OUTPATIENT
Start: 2024-10-09

## 2024-10-10 RX ORDER — LEVOTHYROXINE SODIUM 25 MCG
25 TABLET ORAL DAILY
Qty: 90 TABLET | Refills: 0 | OUTPATIENT
Start: 2024-10-10

## 2024-10-14 ENCOUNTER — OFFICE VISIT (OUTPATIENT)
Dept: FAMILY MEDICINE CLINIC | Age: 74
End: 2024-10-14
Payer: MEDICARE

## 2024-10-14 VITALS
HEIGHT: 63 IN | TEMPERATURE: 97.8 F | SYSTOLIC BLOOD PRESSURE: 124 MMHG | OXYGEN SATURATION: 98 % | BODY MASS INDEX: 31.18 KG/M2 | WEIGHT: 176 LBS | HEART RATE: 54 BPM | DIASTOLIC BLOOD PRESSURE: 78 MMHG

## 2024-10-14 DIAGNOSIS — M79.641 RIGHT HAND PAIN: ICD-10-CM

## 2024-10-14 DIAGNOSIS — M25.531 RIGHT WRIST PAIN: ICD-10-CM

## 2024-10-14 DIAGNOSIS — S93.401A SPRAIN OF RIGHT ANKLE, UNSPECIFIED LIGAMENT, INITIAL ENCOUNTER: ICD-10-CM

## 2024-10-14 DIAGNOSIS — S52.501A CLOSED FRACTURE OF DISTAL END OF RIGHT RADIUS, UNSPECIFIED FRACTURE MORPHOLOGY, INITIAL ENCOUNTER: Primary | ICD-10-CM
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
## 2024-10-14 DIAGNOSIS — M25.571 ACUTE RIGHT ANKLE PAIN: ICD-10-CM

## 2024-10-14 DIAGNOSIS — M79.671 RIGHT FOOT PAIN: ICD-10-CM

## 2024-10-14 PROCEDURE — 3078F DIAST BP <80 MM HG: CPT

## 2024-10-14 PROCEDURE — 99204 OFFICE O/P NEW MOD 45 MIN: CPT

## 2024-10-14 PROCEDURE — 1123F ACP DISCUSS/DSCN MKR DOCD: CPT

## 2024-10-14 PROCEDURE — 3074F SYST BP LT 130 MM HG: CPT

## 2024-10-14 RX ORDER — METHYLPREDNISOLONE 4 MG
TABLET, DOSE PACK ORAL
Qty: 1 KIT | Refills: 0 | Status: SHIPPED | OUTPATIENT
Start: 2024-10-14
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
## 2024-10-16 ENCOUNTER — TELEPHONE (OUTPATIENT)
Dept: ORTHOPEDIC SURGERY | Age: 74
End: 2024-10-16
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
## 2024-11-27 RX ORDER — ROSUVASTATIN CALCIUM 10 MG/1
10 TABLET, COATED ORAL DAILY
Qty: 90 TABLET | Refills: 1 | Status: SHIPPED | OUTPATIENT
Start: 2024-11-27

## 2024-11-27 NOTE — TELEPHONE ENCOUNTER
Name of Medication(s) Requested:  Requested Prescriptions     Pending Prescriptions Disp Refills    rosuvastatin (CRESTOR) 10 MG tablet [Pharmacy Med Name: Rosuvastatin Calcium 10 MG Oral Tablet] 90 tablet 1     Sig: Take 1 tablet by mouth once daily       Medication is on current medication list Yes    Dosage and directions were verified? Yes    Quantity verified: 90 day supply     Pharmacy Verified?  Yes    Last Appointment:  8/5/2024    Future appts:  Future Appointments   Date Time Provider Department Center   12/10/2024  8:10 AM SCHEDULE, RHONA ZUNIGA [facility]   12/20/2024  9:30 AM Jamie Castanon DO E. [facility]   2/21/2025 10:00 AM Jamie Castanon DO E. [facility]        (If no appt send self scheduling link. .REFILLAPPT)  Scheduling request sent?     [] Yes  [x] No    Does patient need updated?  [] Yes  [x] No

## 2024-12-10 ENCOUNTER — LAB (OUTPATIENT)
Dept: FAMILY MEDICINE CLINIC | Age: 74
End: 2024-12-10
Payer: MEDICARE

## 2024-12-10 DIAGNOSIS — R73.01 IFG (IMPAIRED FASTING GLUCOSE): ICD-10-CM

## 2024-12-10 DIAGNOSIS — E55.9 VITAMIN D DEFICIENCY: ICD-10-CM

## 2024-12-10 DIAGNOSIS — I10 ESSENTIAL HYPERTENSION: ICD-10-CM

## 2024-12-10 DIAGNOSIS — M25.569 KNEE PAIN, UNSPECIFIED CHRONICITY, UNSPECIFIED LATERALITY: ICD-10-CM

## 2024-12-10 DIAGNOSIS — R74.8 LIVER ENZYME ELEVATION: ICD-10-CM

## 2024-12-10 DIAGNOSIS — F41.9 ANXIETY: ICD-10-CM

## 2024-12-10 DIAGNOSIS — R53.83 OTHER FATIGUE: ICD-10-CM

## 2024-12-10 DIAGNOSIS — D64.9 ANEMIA, UNSPECIFIED TYPE: ICD-10-CM

## 2024-12-10 DIAGNOSIS — E78.2 MIXED HYPERLIPIDEMIA: ICD-10-CM

## 2024-12-10 DIAGNOSIS — K21.9 GASTROESOPHAGEAL REFLUX DISEASE WITHOUT ESOPHAGITIS: ICD-10-CM

## 2024-12-10 DIAGNOSIS — H57.89 MASS OF EYE, LEFT: ICD-10-CM

## 2024-12-10 DIAGNOSIS — E03.9 HYPOTHYROIDISM, UNSPECIFIED TYPE: ICD-10-CM

## 2024-12-10 LAB
BASOPHILS ABSOLUTE: 0.08 K/UL (ref 0–0.2)
BASOPHILS RELATIVE PERCENT: 1 % (ref 0–2)
EOSINOPHILS ABSOLUTE: 0.16 K/UL (ref 0.05–0.5)
EOSINOPHILS RELATIVE PERCENT: 3 % (ref 0–6)
FOLATE: >20 NG/ML (ref 4.8–24.2)
HCT VFR BLD CALC: 49.8 % (ref 34–48)
HEMOGLOBIN: 15.4 G/DL (ref 11.5–15.5)
IMMATURE GRANULOCYTES %: 0 % (ref 0–5)
IMMATURE GRANULOCYTES ABSOLUTE: <0.03 K/UL (ref 0–0.58)
LYMPHOCYTES ABSOLUTE: 2.29 K/UL (ref 1.5–4)
LYMPHOCYTES RELATIVE PERCENT: 40 % (ref 20–42)
MCH RBC QN AUTO: 28.6 PG (ref 26–35)
MCHC RBC AUTO-ENTMCNC: 30.9 G/DL (ref 32–34.5)
MCV RBC AUTO: 92.6 FL (ref 80–99.9)
MONOCYTES ABSOLUTE: 0.56 K/UL (ref 0.1–0.95)
MONOCYTES RELATIVE PERCENT: 10 % (ref 2–12)
NEUTROPHILS ABSOLUTE: 2.65 K/UL (ref 1.8–7.3)
NEUTROPHILS RELATIVE PERCENT: 46 % (ref 43–80)
PDW BLD-RTO: 14 % (ref 11.5–15)
PLATELET # BLD: 264 K/UL (ref 130–450)
PMV BLD AUTO: 9.8 FL (ref 7–12)
RBC # BLD: 5.38 M/UL (ref 3.5–5.5)
VITAMIN B-12: 844 PG/ML (ref 211–946)
VITAMIN D 25-HYDROXY: 41.7 NG/ML (ref 30–100)
WBC # BLD: 5.8 K/UL (ref 4.5–11.5)

## 2024-12-10 PROCEDURE — 36415 COLL VENOUS BLD VENIPUNCTURE: CPT | Performed by: FAMILY MEDICINE

## 2024-12-11 LAB
ALBUMIN: 4.7 G/DL (ref 3.5–5.2)
ALP BLD-CCNC: 96 U/L (ref 35–104)
ALT SERPL-CCNC: 18 U/L (ref 0–32)
ANION GAP SERPL CALCULATED.3IONS-SCNC: 22 MMOL/L (ref 7–16)
AST SERPL-CCNC: 30 U/L (ref 0–31)
BILIRUB SERPL-MCNC: 0.7 MG/DL (ref 0–1.2)
BUN BLDV-MCNC: 17 MG/DL (ref 6–23)
CALCIUM SERPL-MCNC: 11 MG/DL (ref 8.6–10.2)
CHLORIDE BLD-SCNC: 103 MMOL/L (ref 98–107)
CHOLESTEROL, TOTAL: 161 MG/DL
CO2: 21 MMOL/L (ref 22–29)
CREAT SERPL-MCNC: 1.1 MG/DL (ref 0.5–1)
GFR, ESTIMATED: 53 ML/MIN/1.73M2
GLUCOSE BLD-MCNC: 102 MG/DL (ref 74–99)
HBA1C MFR BLD: 5.6 % (ref 4–5.6)
HDLC SERPL-MCNC: 66 MG/DL
IRON: 117 UG/DL (ref 37–145)
LDL CHOLESTEROL: 74 MG/DL
POTASSIUM SERPL-SCNC: 4.1 MMOL/L (ref 3.5–5)
SODIUM BLD-SCNC: 146 MMOL/L (ref 132–146)
T4 FREE: 1 NG/DL (ref 0.9–1.7)
TOTAL PROTEIN: 8.2 G/DL (ref 6.4–8.3)
TRIGL SERPL-MCNC: 104 MG/DL
TSH SERPL DL<=0.05 MIU/L-ACNC: 3.32 UIU/ML (ref 0.27–4.2)
VLDLC SERPL CALC-MCNC: 21 MG/DL

## 2024-12-20 ENCOUNTER — OFFICE VISIT (OUTPATIENT)
Dept: FAMILY MEDICINE CLINIC | Age: 74
End: 2024-12-20

## 2024-12-20 VITALS
OXYGEN SATURATION: 96 % | TEMPERATURE: 97.8 F | HEART RATE: 63 BPM | HEIGHT: 63 IN | BODY MASS INDEX: 30.62 KG/M2 | SYSTOLIC BLOOD PRESSURE: 128 MMHG | DIASTOLIC BLOOD PRESSURE: 82 MMHG | WEIGHT: 172.8 LBS | RESPIRATION RATE: 16 BRPM

## 2024-12-20 DIAGNOSIS — E83.52 HYPERCALCEMIA: Primary | ICD-10-CM

## 2024-12-20 DIAGNOSIS — I10 ESSENTIAL HYPERTENSION: ICD-10-CM

## 2024-12-20 DIAGNOSIS — E78.2 MIXED HYPERLIPIDEMIA: ICD-10-CM

## 2024-12-20 DIAGNOSIS — R74.8 ELEVATED LIVER ENZYMES: ICD-10-CM

## 2024-12-20 DIAGNOSIS — R73.01 IFG (IMPAIRED FASTING GLUCOSE): ICD-10-CM

## 2024-12-20 DIAGNOSIS — R53.83 OTHER FATIGUE: ICD-10-CM

## 2024-12-20 DIAGNOSIS — F41.9 ANXIETY: ICD-10-CM

## 2024-12-20 DIAGNOSIS — M25.569 KNEE PAIN, UNSPECIFIED CHRONICITY, UNSPECIFIED LATERALITY: ICD-10-CM

## 2024-12-20 DIAGNOSIS — R79.89 ELEVATED SERUM CREATININE: ICD-10-CM

## 2024-12-20 DIAGNOSIS — K21.9 GASTROESOPHAGEAL REFLUX DISEASE WITHOUT ESOPHAGITIS: ICD-10-CM

## 2024-12-20 DIAGNOSIS — S66.919A SPRAIN AND STRAIN OF WRIST: ICD-10-CM

## 2024-12-20 DIAGNOSIS — S63.509A SPRAIN AND STRAIN OF WRIST: ICD-10-CM

## 2024-12-20 DIAGNOSIS — E03.9 HYPOTHYROIDISM, UNSPECIFIED TYPE: ICD-10-CM

## 2024-12-20 DIAGNOSIS — E55.9 VITAMIN D DEFICIENCY: ICD-10-CM

## 2024-12-20 LAB
ALBUMIN: 4.4 G/DL (ref 3.5–5.2)
ALP BLD-CCNC: 91 U/L (ref 35–104)
ALT SERPL-CCNC: 26 U/L (ref 0–32)
ANION GAP SERPL CALCULATED.3IONS-SCNC: 12 MMOL/L (ref 7–16)
AST SERPL-CCNC: 36 U/L (ref 0–31)
BILIRUB SERPL-MCNC: 0.6 MG/DL (ref 0–1.2)
BUN BLDV-MCNC: 12 MG/DL (ref 6–23)
CALCIUM IONIZED: 1.33 MMOL/L (ref 1.15–1.33)
CALCIUM SERPL-MCNC: 10.1 MG/DL (ref 8.6–10.2)
CHLORIDE BLD-SCNC: 106 MMOL/L (ref 98–107)
CO2: 26 MMOL/L (ref 22–29)
CREAT SERPL-MCNC: 0.9 MG/DL (ref 0.5–1)
GFR, ESTIMATED: 65 ML/MIN/1.73M2
GLUCOSE BLD-MCNC: 97 MG/DL (ref 74–99)
PHOSPHORUS: 3 MG/DL (ref 2.5–4.5)
POTASSIUM SERPL-SCNC: 3.9 MMOL/L (ref 3.5–5)
PTH INTACT: 73.8 PG/ML (ref 15–65)
SODIUM BLD-SCNC: 144 MMOL/L (ref 132–146)
TOTAL PROTEIN: 7.5 G/DL (ref 6.4–8.3)

## 2024-12-20 RX ORDER — ALPRAZOLAM 1 MG/1
1 TABLET ORAL 2 TIMES DAILY PRN
Qty: 60 TABLET | Refills: 0 | Status: SHIPPED | OUTPATIENT
Start: 2024-12-20 | End: 2025-01-19

## 2024-12-20 RX ORDER — HYOSCYAMINE SULFATE 0.125 MG
0.12 TABLET ORAL EVERY 8 HOURS PRN
Qty: 180 TABLET | Refills: 1 | Status: SHIPPED | OUTPATIENT
Start: 2024-12-20

## 2024-12-20 RX ORDER — ALPRAZOLAM 1 MG/1
TABLET ORAL
COMMUNITY
Start: 2024-10-21 | End: 2024-12-20 | Stop reason: SDUPTHER

## 2024-12-20 NOTE — PROGRESS NOTES
Lab draw Left AC 22 x 1 1/4\". Venipuncture x 1  
  Psychiatric/Behavioral:  Negative for agitation, behavioral problems, confusion, decreased concentration, hallucinations, self-injury, sleep disturbance and suicidal ideas. The patient is not nervous/anxious and is not hyperactive.          Physical Exam  Vitals and nursing note reviewed.   Constitutional:       General: She is not in acute distress.     Appearance: Normal appearance. She is normal weight. She is not ill-appearing, toxic-appearing or diaphoretic.   HENT:      Head: Normocephalic and atraumatic.      Comments: Left side facial swelling     Right Ear: Tympanic membrane, ear canal and external ear normal. There is no impacted cerumen.      Left Ear: External ear normal. There is no impacted cerumen.      Nose: Nose normal. No congestion or rhinorrhea.      Mouth/Throat:      Mouth: Mucous membranes are moist.      Pharynx: Oropharynx is clear. No oropharyngeal exudate or posterior oropharyngeal erythema.   Eyes:      General: No scleral icterus.        Right eye: No discharge.         Left eye: No discharge.      Extraocular Movements: Extraocular movements intact.      Conjunctiva/sclera: Conjunctivae normal.      Pupils: Pupils are equal, round, and reactive to light.   Neck:      Vascular: No carotid bruit.   Cardiovascular:      Rate and Rhythm: Normal rate and regular rhythm.      Pulses: Normal pulses.      Heart sounds: Normal heart sounds. No murmur heard.     No friction rub. No gallop.   Pulmonary:      Effort: Pulmonary effort is normal. No respiratory distress.      Breath sounds: No stridor. No wheezing, rhonchi or rales.   Chest:      Chest wall: No tenderness.   Abdominal:      General: Abdomen is flat. Bowel sounds are normal. There is no distension.      Palpations: Abdomen is soft. There is no mass.      Tenderness: There is no abdominal tenderness. There is no right CVA tenderness, left CVA tenderness, guarding or rebound.      Hernia: No hernia is present.   Musculoskeletal:

## 2024-12-25 DIAGNOSIS — R79.89 ELEVATED PARATHYROID HORMONE: ICD-10-CM

## 2024-12-25 DIAGNOSIS — E83.52 HYPERCALCEMIA: Primary | ICD-10-CM

## 2025-01-09 LAB — MAMMOGRAPHY, EXTERNAL: NORMAL

## 2025-01-24 ENCOUNTER — OFFICE VISIT (OUTPATIENT)
Age: 75
End: 2025-01-24
Payer: MEDICARE

## 2025-01-24 VITALS
DIASTOLIC BLOOD PRESSURE: 78 MMHG | HEART RATE: 75 BPM | SYSTOLIC BLOOD PRESSURE: 124 MMHG | OXYGEN SATURATION: 98 % | TEMPERATURE: 97.1 F

## 2025-01-24 DIAGNOSIS — L30.9 DERMATITIS: Primary | ICD-10-CM

## 2025-01-24 PROCEDURE — 1159F MED LIST DOCD IN RCRD: CPT | Performed by: NURSE PRACTITIONER

## 2025-01-24 PROCEDURE — 1123F ACP DISCUSS/DSCN MKR DOCD: CPT | Performed by: NURSE PRACTITIONER

## 2025-01-24 PROCEDURE — 99213 OFFICE O/P EST LOW 20 MIN: CPT | Performed by: NURSE PRACTITIONER

## 2025-01-24 PROCEDURE — 3078F DIAST BP <80 MM HG: CPT | Performed by: NURSE PRACTITIONER

## 2025-01-24 PROCEDURE — 3074F SYST BP LT 130 MM HG: CPT | Performed by: NURSE PRACTITIONER

## 2025-01-24 RX ORDER — METHYLPREDNISOLONE 4 MG/1
TABLET ORAL
Qty: 1 KIT | Refills: 0 | Status: SHIPPED | OUTPATIENT
Start: 2025-01-24 | End: 2025-01-30

## 2025-01-24 RX ORDER — TRIAMCINOLONE ACETONIDE 0.25 MG/G
CREAM TOPICAL
Qty: 1 EACH | Refills: 1 | Status: SHIPPED | OUTPATIENT
Start: 2025-01-24

## 2025-01-24 SDOH — ECONOMIC STABILITY: FOOD INSECURITY: WITHIN THE PAST 12 MONTHS, THE FOOD YOU BOUGHT JUST DIDN'T LAST AND YOU DIDN'T HAVE MONEY TO GET MORE.: NEVER TRUE

## 2025-01-24 SDOH — ECONOMIC STABILITY: FOOD INSECURITY: WITHIN THE PAST 12 MONTHS, YOU WORRIED THAT YOUR FOOD WOULD RUN OUT BEFORE YOU GOT MONEY TO BUY MORE.: NEVER TRUE

## 2025-01-24 ASSESSMENT — ENCOUNTER SYMPTOMS
ABDOMINAL DISTENTION: 0
BACK PAIN: 0
RECTAL PAIN: 0
PHOTOPHOBIA: 0
RHINORRHEA: 0
WHEEZING: 0
SINUS PAIN: 0
SORE THROAT: 0
CONSTIPATION: 0
EYE PAIN: 0
SINUS PRESSURE: 0
EYE DISCHARGE: 0
COUGH: 0
EYE ITCHING: 0
APNEA: 0
VOICE CHANGE: 0
DIARRHEA: 0
VOMITING: 0
STRIDOR: 0
ABDOMINAL PAIN: 0
EYE REDNESS: 0
TROUBLE SWALLOWING: 0
BLOOD IN STOOL: 0
CHEST TIGHTNESS: 0
COLOR CHANGE: 0
CHOKING: 0
NAUSEA: 0
SHORTNESS OF BREATH: 0
FACIAL SWELLING: 0
ANAL BLEEDING: 0

## 2025-01-24 ASSESSMENT — PATIENT HEALTH QUESTIONNAIRE - PHQ9
SUM OF ALL RESPONSES TO PHQ QUESTIONS 1-9: 0
1. LITTLE INTEREST OR PLEASURE IN DOING THINGS: NOT AT ALL
SUM OF ALL RESPONSES TO PHQ QUESTIONS 1-9: 0
SUM OF ALL RESPONSES TO PHQ9 QUESTIONS 1 & 2: 0
SUM OF ALL RESPONSES TO PHQ QUESTIONS 1-9: 0
SUM OF ALL RESPONSES TO PHQ QUESTIONS 1-9: 0
2. FEELING DOWN, DEPRESSED OR HOPELESS: NOT AT ALL

## 2025-01-24 NOTE — PROGRESS NOTES
25  Purnima Rondon : 1950 Sex: female  Age: 74 y.o.    Chief Complaint   Patient presents with    Rash     Rash on her nose, noticed about a month ago        Patient complains of a rash on her nose which is itchy and sore.  It has been there for about a month.  Her  also has a rash on his face.  They deny any changes in foods soaps lotions or detergents or medications.        Review of Systems   Constitutional:  Negative for activity change, appetite change, chills, diaphoresis, fatigue, fever and unexpected weight change.   HENT:  Negative for congestion, dental problem, drooling, ear discharge, ear pain, facial swelling, hearing loss, mouth sores, nosebleeds, postnasal drip, rhinorrhea, sinus pressure, sinus pain, sneezing, sore throat, tinnitus, trouble swallowing and voice change.    Eyes:  Negative for photophobia, pain, discharge, redness, itching and visual disturbance.   Respiratory:  Negative for apnea, cough, choking, chest tightness, shortness of breath, wheezing and stridor.    Cardiovascular:  Negative for chest pain, palpitations and leg swelling.   Gastrointestinal:  Negative for abdominal distention, abdominal pain, anal bleeding, blood in stool, constipation, diarrhea, nausea, rectal pain and vomiting.   Endocrine: Negative for cold intolerance, heat intolerance, polydipsia, polyphagia and polyuria.   Genitourinary:  Negative for decreased urine volume, difficulty urinating, dysuria, enuresis, flank pain, frequency, genital sores, hematuria and urgency.   Musculoskeletal:  Negative for arthralgias, back pain, gait problem, joint swelling, myalgias, neck pain and neck stiffness.   Skin:  Positive for rash. Negative for color change, pallor and wound.   Allergic/Immunologic: Negative for environmental allergies, food allergies and immunocompromised state.   Neurological:  Negative for dizziness, tremors, seizures, syncope, facial asymmetry, speech difficulty, weakness,

## 2025-02-03 RX ORDER — PANTOPRAZOLE SODIUM 40 MG/1
40 TABLET, DELAYED RELEASE ORAL DAILY
Qty: 90 TABLET | Refills: 1 | Status: SHIPPED | OUTPATIENT
Start: 2025-02-03 | End: 2025-08-02

## 2025-02-03 RX ORDER — TRIAMTERENE AND HYDROCHLOROTHIAZIDE 37.5; 25 MG/1; MG/1
1 TABLET ORAL DAILY
Qty: 90 TABLET | Refills: 1 | Status: SHIPPED | OUTPATIENT
Start: 2025-02-03

## 2025-02-03 RX ORDER — AMLODIPINE BESYLATE 5 MG/1
5 TABLET ORAL DAILY
Qty: 90 TABLET | Refills: 1 | Status: SHIPPED | OUTPATIENT
Start: 2025-02-03

## 2025-02-03 RX ORDER — ROSUVASTATIN CALCIUM 10 MG/1
10 TABLET, COATED ORAL DAILY
Qty: 90 TABLET | Refills: 1 | Status: SHIPPED | OUTPATIENT
Start: 2025-02-03

## 2025-02-03 NOTE — TELEPHONE ENCOUNTER
Name of Medication(s) Requested:  Requested Prescriptions     Pending Prescriptions Disp Refills    amLODIPine (NORVASC) 5 MG tablet 90 tablet 1     Sig: Take 1 tablet by mouth daily    pantoprazole (PROTONIX) 40 MG tablet 90 tablet 1     Sig: Take 1 tablet by mouth daily    rosuvastatin (CRESTOR) 10 MG tablet 90 tablet 1     Sig: Take 1 tablet by mouth daily    triamcinolone (KENALOG) 0.025 % cream 1 each 1     Sig: Apply topically 2 times daily.       Medication is on current medication list Yes    Dosage and directions were verified? Yes    Quantity verified: 90 day supply     Pharmacy Verified?  Yes    Last Appointment:  12/20/2024    Future appts:  Future Appointments   Date Time Provider Department Center   6/17/2025  8:05 AM SCHEDULE, [facility]. [facility]   6/20/2025  9:30 AM Jamie Castanon DO E. PAL [facility] DEP        (If no appt send self scheduling link. .REFILLAPPT)  Scheduling request sent?     [] Yes  [x] No    Does patient need updated?  [] Yes  [x] No

## 2025-04-01 RX ORDER — SERTRALINE HYDROCHLORIDE 25 MG/1
25 TABLET, FILM COATED ORAL DAILY
Qty: 90 TABLET | Refills: 1 | Status: SHIPPED | OUTPATIENT
Start: 2025-04-01

## 2025-04-01 NOTE — TELEPHONE ENCOUNTER
Name of Medication(s) Requested:  Requested Prescriptions     Pending Prescriptions Disp Refills    sertraline (ZOLOFT) 25 MG tablet [Pharmacy Med Name: Sertraline HCl 25 MG Oral Tablet] 90 tablet 1     Sig: Take 1 tablet by mouth once daily       Medication is on current medication list Yes    Dosage and directions were verified? Yes    Quantity verified: 90 day supply     Pharmacy Verified?  Yes    Last Appointment:  12/20/2024    Future appts:  Future Appointments   Date Time Provider Department Center   6/17/2025  8:05 AM SCHEDULE, [facility]X E. [facility] E. [facility] DE[facility]   6/20/2025  9:30 AM Jamie Castanon DO E. PAL [facility] DEP        (If no appt send self scheduling link. .REFILLAPPT)  Scheduling request sent?     [] Yes  [x] No    Does patient need updated?  [] Yes  [x] No

## 2025-05-27 NOTE — PROGRESS NOTES
Called blood bank. They state it will be around 30-35 minutes before the blood will be ready.    Left anicubicle venipuncture perfromed

## 2025-06-19 ENCOUNTER — OFFICE VISIT (OUTPATIENT)
Dept: ORTHOPEDIC SURGERY | Age: 75
End: 2025-06-19
Payer: MEDICARE

## 2025-06-19 VITALS
DIASTOLIC BLOOD PRESSURE: 78 MMHG | WEIGHT: 170 LBS | RESPIRATION RATE: 18 BRPM | BODY MASS INDEX: 29.02 KG/M2 | OXYGEN SATURATION: 97 % | HEIGHT: 64 IN | HEART RATE: 55 BPM | TEMPERATURE: 97.3 F | SYSTOLIC BLOOD PRESSURE: 161 MMHG

## 2025-06-19 DIAGNOSIS — M76.892 TENDINITIS OF LEFT QUADRICEPS TENDON: ICD-10-CM

## 2025-06-19 DIAGNOSIS — M25.562 ACUTE PAIN OF LEFT KNEE: Primary | ICD-10-CM

## 2025-06-19 DIAGNOSIS — M17.12 PRIMARY OSTEOARTHRITIS OF LEFT KNEE: ICD-10-CM

## 2025-06-19 DIAGNOSIS — M70.42 PREPATELLAR BURSITIS OF LEFT KNEE: ICD-10-CM

## 2025-06-19 PROCEDURE — 1159F MED LIST DOCD IN RCRD: CPT | Performed by: STUDENT IN AN ORGANIZED HEALTH CARE EDUCATION/TRAINING PROGRAM

## 2025-06-19 PROCEDURE — 99203 OFFICE O/P NEW LOW 30 MIN: CPT | Performed by: STUDENT IN AN ORGANIZED HEALTH CARE EDUCATION/TRAINING PROGRAM

## 2025-06-19 PROCEDURE — 3078F DIAST BP <80 MM HG: CPT | Performed by: STUDENT IN AN ORGANIZED HEALTH CARE EDUCATION/TRAINING PROGRAM

## 2025-06-19 PROCEDURE — 3077F SYST BP >= 140 MM HG: CPT | Performed by: STUDENT IN AN ORGANIZED HEALTH CARE EDUCATION/TRAINING PROGRAM

## 2025-06-19 PROCEDURE — 1123F ACP DISCUSS/DSCN MKR DOCD: CPT | Performed by: STUDENT IN AN ORGANIZED HEALTH CARE EDUCATION/TRAINING PROGRAM

## 2025-06-19 RX ORDER — MINOCYCLINE HYDROCHLORIDE 100 MG/1
100 CAPSULE ORAL
COMMUNITY

## 2025-06-19 NOTE — PROGRESS NOTES
[facility]  ORTHOPAEDICS   DATE OF VISIT: 06/19/25  New Knee Patient     Referring Provider:   No referring provider defined for this encounter.    CHIEF COMPLAINT:   Chief Complaint   Patient presents with    Knee Pain     PT is here today for left knee pain. Pt states that's he has a lump on the back of her knee, and a dull ache pain in the front of her knee and a sharp pain when she moves her knee. She has been having this pain for 2 months.         HPI:      History of Present Illness  The patient presents for evaluation of left knee pain.    He has been experiencing discomfort in his left knee for the past 2 months, with no specific injury reported. The pain is localized to the anterior aspect of the knee, where he has noticed a small protrusion. He also reports the presence of a similar lump on the posterior aspect of his right knee. He has not sought any treatment for these symptoms. His current physical activity includes attending the gym 3 times a week and participating in chair yoga sessions.    Supplemental Information  He has diverticulosis.    SOCIAL HISTORY  Exercise: Participates in yoga-like activities three days a week.      PAST MEDICAL HISTORY  Past Medical History:   Diagnosis Date    AC (acromioclavicular) arthritis     right shoulder    Anxiety     Diverticulitis     DJD (degenerative joint disease)     Esophagitis     GERD (gastroesophageal reflux disease)     Headache     Hiatal hernia     Hyperlipidemia     Hypertension     Hypothyroid     Hypothyroid     IBS (irritable bowel syndrome)     Impingement syndrome of right shoulder     right shoulder    PONV (postoperative nausea and vomiting)     Pre-operative clearance     medical and neuro for OR 1/31/2019.    SDH (subdural hematoma) (HCC)     h/o  follows with Dr Lr       PAST SURGICAL HISTORY  Past Surgical History:   Procedure Laterality Date    ABDOMEN SURGERY      ABDOMINOPLASTY      BRAIN AVM REPAIR Left     BREAST REDUCTION

## 2025-06-30 ENCOUNTER — TELEPHONE (OUTPATIENT)
Dept: PHYSICAL THERAPY | Age: 75
End: 2025-06-30

## 2025-07-03 ENCOUNTER — LAB (OUTPATIENT)
Dept: FAMILY MEDICINE CLINIC | Age: 75
End: 2025-07-03
Payer: MEDICARE

## 2025-07-03 DIAGNOSIS — E55.9 VITAMIN D DEFICIENCY: ICD-10-CM

## 2025-07-03 DIAGNOSIS — R79.89 ELEVATED PARATHYROID HORMONE: ICD-10-CM

## 2025-07-03 DIAGNOSIS — E78.2 MIXED HYPERLIPIDEMIA: ICD-10-CM

## 2025-07-03 DIAGNOSIS — E03.9 HYPOTHYROIDISM, UNSPECIFIED TYPE: ICD-10-CM

## 2025-07-03 DIAGNOSIS — R73.01 IFG (IMPAIRED FASTING GLUCOSE): ICD-10-CM

## 2025-07-03 DIAGNOSIS — I10 ESSENTIAL HYPERTENSION: Primary | ICD-10-CM

## 2025-07-03 DIAGNOSIS — R53.83 OTHER FATIGUE: ICD-10-CM

## 2025-07-03 DIAGNOSIS — E83.52 HYPERCALCEMIA: ICD-10-CM

## 2025-07-03 LAB
ALBUMIN: 4.2 G/DL (ref 3.5–5.2)
ALP BLD-CCNC: 94 U/L (ref 35–104)
ALT SERPL-CCNC: 21 U/L (ref 0–35)
ANION GAP SERPL CALCULATED.3IONS-SCNC: 14 MMOL/L (ref 7–16)
AST SERPL-CCNC: 31 U/L (ref 0–35)
BASOPHILS ABSOLUTE: 0.07 K/UL (ref 0–0.2)
BASOPHILS RELATIVE PERCENT: 1 % (ref 0–2)
BILIRUB SERPL-MCNC: 0.6 MG/DL (ref 0–1.2)
BUN BLDV-MCNC: 15 MG/DL (ref 8–23)
CALCIUM IONIZED: 1.27 MMOL/L (ref 1.15–1.33)
CALCIUM SERPL-MCNC: 10 MG/DL (ref 8.8–10.2)
CHLORIDE BLD-SCNC: 103 MMOL/L (ref 98–107)
CO2: 25 MMOL/L (ref 22–29)
CREAT SERPL-MCNC: 1 MG/DL (ref 0.5–1)
EOSINOPHILS ABSOLUTE: 0.16 K/UL (ref 0.05–0.5)
EOSINOPHILS RELATIVE PERCENT: 3 % (ref 0–6)
FOLATE: 36.6 NG/ML (ref 4.6–34.8)
GFR, ESTIMATED: 60 ML/MIN/1.73M2
GLUCOSE BLD-MCNC: 94 MG/DL (ref 74–99)
HBA1C MFR BLD: 5.6 % (ref 4–5.6)
HCT VFR BLD CALC: 46.4 % (ref 34–48)
HEMOGLOBIN: 14.6 G/DL (ref 11.5–15.5)
IMMATURE GRANULOCYTES %: 1 % (ref 0–5)
IMMATURE GRANULOCYTES ABSOLUTE: 0.03 K/UL (ref 0–0.58)
IRON: 97 UG/DL (ref 37–145)
LYMPHOCYTES ABSOLUTE: 2.23 K/UL (ref 1.5–4)
LYMPHOCYTES RELATIVE PERCENT: 43 % (ref 20–42)
MCH RBC QN AUTO: 28.3 PG (ref 26–35)
MCHC RBC AUTO-ENTMCNC: 31.5 G/DL (ref 32–34.5)
MCV RBC AUTO: 89.9 FL (ref 80–99.9)
MONOCYTES ABSOLUTE: 0.49 K/UL (ref 0.1–0.95)
MONOCYTES RELATIVE PERCENT: 9 % (ref 2–12)
NEUTROPHILS ABSOLUTE: 2.21 K/UL (ref 1.8–7.3)
NEUTROPHILS RELATIVE PERCENT: 43 % (ref 43–80)
PDW BLD-RTO: 14.6 % (ref 11.5–15)
PHOSPHORUS: 2.8 MG/DL (ref 2.5–4.5)
PLATELET # BLD: 260 K/UL (ref 130–450)
PMV BLD AUTO: 10.1 FL (ref 7–12)
POTASSIUM SERPL-SCNC: 3.9 MMOL/L (ref 3.5–5.1)
PTH INTACT: 74 PG/ML (ref 15–65)
RBC # BLD: 5.16 M/UL (ref 3.5–5.5)
SODIUM BLD-SCNC: 143 MMOL/L (ref 136–145)
T4 FREE: 0.9 NG/DL (ref 0.9–1.7)
TOTAL PROTEIN: 7.3 G/DL (ref 6.4–8.3)
TSH SERPL DL<=0.05 MIU/L-ACNC: 3.3 UIU/ML (ref 0.27–4.2)
VITAMIN B-12: 900 PG/ML (ref 232–1245)
VITAMIN D 25-HYDROXY: 32.6 NG/ML (ref 30–100)
WBC # BLD: 5.2 K/UL (ref 4.5–11.5)

## 2025-07-03 PROCEDURE — 36415 COLL VENOUS BLD VENIPUNCTURE: CPT | Performed by: FAMILY MEDICINE

## 2025-07-10 ENCOUNTER — HOSPITAL ENCOUNTER (OUTPATIENT)
Dept: PHYSICAL THERAPY | Age: 75
Setting detail: THERAPIES SERIES
Discharge: HOME | End: 2025-07-10
Payer: MEDICARE

## 2025-07-10 PROCEDURE — 97161 PT EVAL LOW COMPLEX 20 MIN: CPT

## 2025-07-10 NOTE — PLAN OF CARE
[facility]  [facility]  [address] [address]  Dept: [phone]  Loc: [phone]    PHYSICAL THERAPY PLAN OF CARE: INITIAL EVALUATION    Patient: Purnima Rondon (75 y.o. female)   Examination Date: 07/10/2025  Plan of Care Certification Period: 7/10/2025 to  25      :  1950  MRN: [id number]  CSN: [id number]   Insurance: Payor: [facility] MEDICARE / Plan: [facility] MEDICARE / Product Type: *No Product type* /   Insurance ID: [id number] - (Medicare Managed) Secondary Insurance (if applicable):    Referring Physician: Chetan Hatch DO     PCP: Jamie Castanon DO Visits to Date/Visits Approved:   /      No Show/Cancelled Appts:   /       Medical Diagnosis: Prepatellar bursitis of left knee [M70.42]  Tendinitis of left quadriceps tendon [M76.892]  Primary osteoarthritis of left knee [M17.12] Left Knee Pain: Acute  No data recorded       ASSESSMENT     Impression:      Body Structures, Functions, Activity Limitations Requiring Skilled Therapeutic Intervention: Decreased functional mobility , Decreased ROM, Decreased strength, Decreased endurance, Decreased sensation, Decreased balance, Decreased coordination, Decreased posture, Increased pain    Statement of Medical Necessity: Physical Therapy is both indicated and medically necessary as outlined in the POC to increase the likelihood of meeting the functionally related goals stated below.     Patient's Activity Tolerance: Patient tolerated evaluation without incident      Patient's rehabilitation potential/prognosis is considered to be: Fair    Factors which may impact rehabilitation potential include: None        GOALS   Patient Goal(s):    Short Term Goals Completed by 2 weeks Goal Status   Patient will be able to engage in consistent and progressive active exercise while reporting no increase in left knee pain symptoms New   Establish HEP New                         Long Term Goals Completed by 4-6 weeks

## 2025-07-10 NOTE — PROGRESS NOTES
Physical Therapy  Initial Assessment  Date: 7/10/2025  Patient Name: Purnima Rondon  MRN: [id number]  : 1950    Referring Physician: Chetan Hatch DO     PCP: Jamie Castanon DO     Medical Diagnosis: Prepatellar bursitis of left knee [M70.42]  Tendinitis of left quadriceps tendon [M76.892]  Primary osteoarthritis of left knee [M17.12] Left Knee Pain: Acute  No data recorded    Insurance: Payor: [facility] MEDICARE / Plan: [facility] MEDICARE / Product Type: *No Product type* /   Insurance ID: [id number] - (Medicare Managed)      Restrictions:       Subjective:  General  Chart Reviewed: Yes  Patient Assessed for Rehabilitation Services: Yes  Additional Pertinent Hx: Patient presents to PT to assess and treat complaints of acute onset , persistent pain affecting the left knee Patient reports symptoms began insidiously 2+ mos ago First noted symptoms with the left knee in extreme flexion with varus stress. Pain has since progressed and is noted with suistained activity, espcially weight bearing. Xray of the left knee showed no acute issues Patient denies PMHx of any significant left knee issues  History obtained from:: Chart Review, Patient  Family/Caregiver Present: No  Diagnosis: Left Knee Pain: Acute  Follows Commands: Within Functional Limits  PT Visit Information  Onset Date: 25  PT Insurance Information: BC/ Medicare  Subjective  Subjective: Pain rated Intermittent Pain generally assocaited withweight bearing activity. Patient notes pain symptoms do not seem to fit a consistent pattern  Prior diagnostic testing:: X-ray  Previous treatments prior to current episode?: Medications  Pain Screening  Patient Currently in Pain: Denies       Vision/Hearing:  Vision  Vision: Within Functional Limits  Hearing  Hearing: Within functional limits    Orientation:  Orientation  Overall Orientation Status: Within Functional Limits  Patient affect:: Normal  Follows Commands: Within Functional

## 2025-07-16 ENCOUNTER — HOSPITAL ENCOUNTER (OUTPATIENT)
Dept: PHYSICAL THERAPY | Age: 75
Setting detail: THERAPIES SERIES
Discharge: HOME | End: 2025-07-16
Payer: MEDICARE

## 2025-07-16 PROCEDURE — 97110 THERAPEUTIC EXERCISES: CPT

## 2025-07-16 PROCEDURE — G0283 ELEC STIM OTHER THAN WOUND: HCPCS

## 2025-07-16 NOTE — PROGRESS NOTES
[facility]  Phone: [phone] Fax: [phone]       Physical Therapy Daily Treatment Note  Date:  2025    Patient Name:  Purnima Rondon    :  1950  MRN: [id number]    Restrictions/Precautions:    Diagnosis:  Left knee Pain   Treatment Diagnosis:    Insurance/Certification information:  BC/BS   Referring Physician:  Dr Hatch   Plan of care signed (Y/N):    Visit# / total visits:   Pain level: /10  Time In:    1310    Time Out:    1350      Subjective:      Exercises:  Exercise/Equipment Resistance/Repetitions Other comments     Bike 10              Bridge/SLR 10 reps ea    Mini squat on disc  10 reps      Standing Hip/Knee Ext   15 reps      TKE  12 reps                                                                                                     Other Therapeutic Activities:      Home Exercise Program:      Manual Treatments:      Modalities:  Estim with ice left posterior knee 20 min     Timed Code Treatment Minutes:  30     Total Treatment Minutes:  40    Treatment/Activity Tolerance:  [x] Patient tolerated treatment well [] Patient limited by fatigue  [] Patient limited by pain  [] Patient limited by other medical complications  [] Other:     Plan:   [x] Continue per plan of care [] Alter current plan (see comments)  [] Plan of care initiated [] Hold pending MD visit [] Discharge  Plan for Next Session:         Treatment Charges: Mins Units   Initial Evaluation     Re-Evaluation     Ther Exercise         TE 20 1   Manual Therapy     MT     Ther Activities        TA     Gait Training          GT     Neuro Re-education NR     Modalities 20 1   Non-Billable Service Time     Other     Total Time/Units 40 2     Electronically signed by:  Eleazar Obrien, PT [id number]

## 2025-07-18 ENCOUNTER — OFFICE VISIT (OUTPATIENT)
Dept: FAMILY MEDICINE CLINIC | Age: 75
End: 2025-07-18

## 2025-07-18 VITALS
DIASTOLIC BLOOD PRESSURE: 88 MMHG | RESPIRATION RATE: 16 BRPM | OXYGEN SATURATION: 98 % | WEIGHT: 177.4 LBS | TEMPERATURE: 98.1 F | HEART RATE: 58 BPM | SYSTOLIC BLOOD PRESSURE: 138 MMHG | HEIGHT: 64 IN | BODY MASS INDEX: 30.29 KG/M2

## 2025-07-18 DIAGNOSIS — M25.569 KNEE PAIN, UNSPECIFIED CHRONICITY, UNSPECIFIED LATERALITY: Primary | ICD-10-CM

## 2025-07-18 DIAGNOSIS — H57.89 MASS OF EYE, LEFT: ICD-10-CM

## 2025-07-18 DIAGNOSIS — I10 ESSENTIAL HYPERTENSION: ICD-10-CM

## 2025-07-18 DIAGNOSIS — R53.83 OTHER FATIGUE: ICD-10-CM

## 2025-07-18 DIAGNOSIS — E78.2 MIXED HYPERLIPIDEMIA: ICD-10-CM

## 2025-07-18 DIAGNOSIS — K21.9 GASTROESOPHAGEAL REFLUX DISEASE WITHOUT ESOPHAGITIS: ICD-10-CM

## 2025-07-18 DIAGNOSIS — E83.52 HYPERCALCEMIA: ICD-10-CM

## 2025-07-18 DIAGNOSIS — E55.9 VITAMIN D DEFICIENCY: ICD-10-CM

## 2025-07-18 DIAGNOSIS — F41.9 ANXIETY: ICD-10-CM

## 2025-07-18 DIAGNOSIS — J32.9 CHRONIC SINUSITIS, UNSPECIFIED LOCATION: ICD-10-CM

## 2025-07-18 RX ORDER — TRIAMTERENE AND HYDROCHLOROTHIAZIDE 37.5; 25 MG/1; MG/1
1 TABLET ORAL DAILY
Qty: 90 TABLET | Refills: 1 | Status: SHIPPED | OUTPATIENT
Start: 2025-07-18

## 2025-07-18 RX ORDER — AMLODIPINE BESYLATE 5 MG/1
5 TABLET ORAL DAILY
Qty: 90 TABLET | Refills: 1 | Status: SHIPPED | OUTPATIENT
Start: 2025-07-18

## 2025-07-18 RX ORDER — ROSUVASTATIN CALCIUM 10 MG/1
10 TABLET, COATED ORAL DAILY
Qty: 90 TABLET | Refills: 1 | Status: SHIPPED | OUTPATIENT
Start: 2025-07-18

## 2025-07-18 RX ORDER — PANTOPRAZOLE SODIUM 40 MG/1
40 TABLET, DELAYED RELEASE ORAL DAILY
Qty: 90 TABLET | Refills: 1 | Status: SHIPPED | OUTPATIENT
Start: 2025-07-18 | End: 2026-01-14

## 2025-07-18 NOTE — PROGRESS NOTES
Musculoskeletal:         General: No swelling, tenderness, deformity or signs of injury. Normal range of motion.      Cervical back: Normal range of motion and neck supple. No rigidity. No muscular tenderness.      Right lower leg: No edema.      Left lower leg: No edema.   Lymphadenopathy:      Cervical: No cervical adenopathy.   Skin:     General: Skin is warm and dry.      Capillary Refill: Capillary refill takes less than 2 seconds.      Coloration: Skin is not jaundiced or pale.      Findings: No bruising, erythema, lesion or rash.      Comments: Ganglion r hand    Neurological:      General: No focal deficit present.      Mental Status: She is alert and oriented to person, place, and time. Mental status is at baseline.      Cranial Nerves: No cranial nerve deficit.      Sensory: No sensory deficit.      Motor: No weakness.      Coordination: Coordination normal.      Gait: Gait normal.      Deep Tendon Reflexes: Reflexes normal.   Psychiatric:         Mood and Affect: Mood normal.         Behavior: Behavior normal.         Thought Content: Thought content normal.         Judgment: Judgment normal.         Assessment and Plan:  Purnima was seen today for discuss labs.    Diagnoses and all orders for this visit:    Knee pain, unspecified chronicity, unspecified laterality    Essential hypertension    Gastroesophageal reflux disease without esophagitis    Mixed hyperlipidemia    Other fatigue    Mass of eye, left    Vitamin D deficiency    Hypercalcemia    Anxiety    Chronic sinusitis, unspecified location    Other orders  -     amLODIPine (NORVASC) 5 MG tablet; Take 1 tablet by mouth daily  -     pantoprazole (PROTONIX) 40 MG tablet; Take 1 tablet by mouth daily  -     rosuvastatin (CRESTOR) 10 MG tablet; Take 1 tablet by mouth daily  -     triamterene-hydroCHLOROthiazide (MAXZIDE-25) 37.5-25 MG per tablet; Take 1 tablet by mouth daily          Discussions/Education provided to patients during visit:  []

## 2025-07-22 ENCOUNTER — HOSPITAL ENCOUNTER (OUTPATIENT)
Dept: PHYSICAL THERAPY | Age: 75
Setting detail: THERAPIES SERIES
Discharge: HOME | End: 2025-07-22
Payer: MEDICARE

## 2025-07-22 PROCEDURE — 97110 THERAPEUTIC EXERCISES: CPT

## 2025-07-22 PROCEDURE — G0283 ELEC STIM OTHER THAN WOUND: HCPCS

## 2025-07-22 NOTE — PROGRESS NOTES
[facility]  Phone: [phone] Fax: [phone]       Physical Therapy Daily Treatment Note  Date:  2025    Patient Name:  Purnima Rondon    :  1950  MRN: [id number]    Restrictions/Precautions:    Diagnosis:  Left knee Pain   Treatment Diagnosis:    Insurance/Certification information:  BC/BS   Referring Physician:  Dr Hatch   Plan of care signed (Y/N):    Visit# / total visits: 2/5  Pain level: /10  Time In:    1000   Time Out:    1040      Subjective:      Exercises:  Exercise/Equipment Resistance/Repetitions Other comments     Bike 10              Bridge/SLR 10 reps ea    Mini squat on disc  10 reps      Standing Hip/Knee Ext   15 reps      TKE  12 reps                                                                                                     Other Therapeutic Activities:      Home Exercise Program:      Manual Treatments:      Modalities:  Estim with ice left posterior knee 20 min     Timed Code Treatment Minutes:  30     Total Treatment Minutes:  40    Treatment/Activity Tolerance:  [x] Patient tolerated treatment well [] Patient limited by fatigue  [] Patient limited by pain  [] Patient limited by other medical complications  [] Other:     Plan:   [x] Continue per plan of care [] Alter current plan (see comments)  [] Plan of care initiated [] Hold pending MD visit [] Discharge  Plan for Next Session:         Treatment Charges: Mins Units   Initial Evaluation     Re-Evaluation     Ther Exercise         TE 20 1   Manual Therapy     MT     Ther Activities        TA     Gait Training          GT     Neuro Re-education NR     Modalities 20 1   Non-Billable Service Time     Other     Total Time/Units 40 2     Electronically signed by:  Eleazar Obrien, PT [id number]

## 2025-07-24 ENCOUNTER — HOSPITAL ENCOUNTER (OUTPATIENT)
Dept: PHYSICAL THERAPY | Age: 75
Setting detail: THERAPIES SERIES
Discharge: HOME | End: 2025-07-24
Payer: MEDICARE

## 2025-07-24 PROCEDURE — 97110 THERAPEUTIC EXERCISES: CPT

## 2025-07-24 NOTE — PROGRESS NOTES
[facility]  Phone: [phone] Fax: [phone]       Physical Therapy Daily Treatment Note  Date:  2025    Patient Name:  Purnima Rondon    :  1950  MRN: [id number]    Restrictions/Precautions:    Diagnosis:  Left knee Pain   Treatment Diagnosis:    Insurance/Certification information:  BC/BS   Referring Physician:  Dr Hatch   Plan of care signed (Y/N):    Visit# / total visits: 3/5  Pain level: /10  Time In:    1000   Time Out:    1040      Subjective:      Exercises:  Exercise/Equipment Resistance/Repetitions Other comments     Bike 10              Bridge/SLR 10 reps ea    Mini squat on disc  10 reps      Standing Hip/Knee Ext   15 reps      TKE  12 reps                                                                                                     Other Therapeutic Activities:      Home Exercise Program:      Manual Treatments:      Modalities:  Estim with ice left posterior knee 20 min     Timed Code Treatment Minutes:  30     Total Treatment Minutes:  40    Treatment/Activity Tolerance:  [x] Patient tolerated treatment well [] Patient limited by fatigue  [] Patient limited by pain  [] Patient limited by other medical complications  [] Other:     Plan:   [x] Continue per plan of care [] Alter current plan (see comments)  [] Plan of care initiated [] Hold pending MD visit [] Discharge  Plan for Next Session:         Treatment Charges: Mins Units   Initial Evaluation     Re-Evaluation     Ther Exercise         TE 20 1   Manual Therapy     MT     Ther Activities        TA     Gait Training          GT     Neuro Re-education NR     Modalities 20 1   Non-Billable Service Time     Other     Total Time/Units 40 2     Electronically signed by:  Eleazar Obrien, PT [id number]

## 2025-07-27 PROBLEM — S06.5XAA SUBDURAL HEMATOMA (HCC): Status: RESOLVED | Noted: 2018-08-31 | Resolved: 2025-07-27

## 2025-07-29 ENCOUNTER — HOSPITAL ENCOUNTER (OUTPATIENT)
Dept: PHYSICAL THERAPY | Age: 75
Setting detail: THERAPIES SERIES
Discharge: HOME | End: 2025-07-29
Payer: MEDICARE

## 2025-07-29 PROCEDURE — 97110 THERAPEUTIC EXERCISES: CPT

## 2025-07-29 NOTE — PROGRESS NOTES
[facility]  Phone: [phone] Fax: [phone]       Physical Therapy Daily Treatment Note  Date:  2025    Patient Name:  Purnima Rondon    :  1950  MRN: [id number]    Restrictions/Precautions:    Diagnosis:  Left knee Pain   Treatment Diagnosis:    Insurance/Certification information:  BC/BS   Referring Physician:  Dr Hatch   Plan of care signed (Y/N):    Visit# / total visits: 4/5  Pain level: /10  Time In:    1000   Time Out:    1040      Subjective:      Exercises:  Exercise/Equipment Resistance/Repetitions Other comments     Bike 10              Bridge/SLR 10 reps ea    Mini squat on disc  10 reps      Standing Hip/Knee Ext   15 reps      TKE  12 reps                                                                                                     Other Therapeutic Activities:      Home Exercise Program:      Manual Treatments:      Modalities:  Estim with ice left posterior knee 20 min     Timed Code Treatment Minutes:  30     Total Treatment Minutes:  40    Treatment/Activity Tolerance:  [x] Patient tolerated treatment well [] Patient limited by fatigue  [] Patient limited by pain  [] Patient limited by other medical complications  [] Other:     Plan:   [x] Continue per plan of care [] Alter current plan (see comments)  [] Plan of care initiated [] Hold pending MD visit [] Discharge  Plan for Next Session:         Treatment Charges: Mins Units   Initial Evaluation     Re-Evaluation     Ther Exercise         TE 20 1   Manual Therapy     MT     Ther Activities        TA     Gait Training          GT     Neuro Re-education NR     Modalities 20 1   Non-Billable Service Time     Other     Total Time/Units 40 2     Electronically signed by:  Eleazar Obrien, PT [id number]

## 2025-08-01 ENCOUNTER — HOSPITAL ENCOUNTER (OUTPATIENT)
Dept: PHYSICAL THERAPY | Age: 75
Setting detail: THERAPIES SERIES
Discharge: HOME | End: 2025-08-01
Payer: MEDICARE

## 2025-08-01 PROCEDURE — 97110 THERAPEUTIC EXERCISES: CPT

## 2025-08-01 NOTE — PROGRESS NOTES
[facility]  Phone: [phone] Fax: [phone]       Physical Therapy Daily Treatment Note  Date:  2025    Patient Name:  Purnima Rondon    :  1950  MRN: [id number]    Restrictions/Precautions:    Diagnosis:  Left knee Pain   Treatment Diagnosis:    Insurance/Certification information:  BC/BS   Referring Physician:  Dr Hatch   Plan of care signed (Y/N):    Visit# / total visits:   Pain level: /10  Time In:    1000   Time Out:    1040      Subjective:      Exercises:  Exercise/Equipment Resistance/Repetitions Other comments     Bike 10              Bridge/SLR 10 reps ea    Mini squat on disc  10 reps      Standing Hip/Knee Ext   15 reps      TKE  12 reps                                                                                                     Other Therapeutic Activities:      Home Exercise Program:      Manual Treatments:      Modalities:  Estim with ice left posterior knee 20 min     Timed Code Treatment Minutes:  30     Total Treatment Minutes:  40    Treatment/Activity Tolerance:  [x] Patient tolerated treatment well [] Patient limited by fatigue  [] Patient limited by pain  [] Patient limited by other medical complications  [] Other:     Plan:   [x] Continue per plan of care [] Alter current plan (see comments)  [] Plan of care initiated [] Hold pending MD visit [] Discharge  Plan for Next Session:         Treatment Charges: Mins Units   Initial Evaluation     Re-Evaluation     Ther Exercise         TE 20 1   Manual Therapy     MT     Ther Activities        TA     Gait Training          GT     Neuro Re-education NR     Modalities 20 1   Non-Billable Service Time     Other     Total Time/Units 40 2     Electronically signed by:  Eleazar Obrien, PT [id number]

## 2025-08-04 ENCOUNTER — OFFICE VISIT (OUTPATIENT)
Age: 75
End: 2025-08-04

## 2025-08-04 VITALS
DIASTOLIC BLOOD PRESSURE: 78 MMHG | TEMPERATURE: 98.3 F | HEART RATE: 61 BPM | OXYGEN SATURATION: 97 % | BODY MASS INDEX: 31.27 KG/M2 | WEIGHT: 176.5 LBS | RESPIRATION RATE: 16 BRPM | SYSTOLIC BLOOD PRESSURE: 136 MMHG | HEIGHT: 63 IN

## 2025-08-04 DIAGNOSIS — J02.9 SORE THROAT: ICD-10-CM

## 2025-08-04 DIAGNOSIS — R09.81 SINUS CONGESTION: Primary | ICD-10-CM

## 2025-08-04 DIAGNOSIS — H92.02 LEFT EAR PAIN: ICD-10-CM

## 2025-08-04 LAB
Lab: NORMAL
PERFORMING INSTRUMENT: NORMAL
QC PASS/FAIL: NORMAL
SARS-COV-2, POC: NORMAL

## 2025-08-04 RX ORDER — METHYLPREDNISOLONE 4 MG/1
TABLET ORAL
Qty: 21 TABLET | Refills: 0 | Status: SHIPPED | OUTPATIENT
Start: 2025-08-04 | End: 2025-08-10

## 2025-08-04 RX ORDER — AZITHROMYCIN 250 MG/1
TABLET, FILM COATED ORAL
Qty: 6 TABLET | Refills: 0 | Status: SHIPPED | OUTPATIENT
Start: 2025-08-04 | End: 2025-08-14

## 2025-08-26 PROBLEM — Z86.73 HISTORY OF STROKE: Status: ACTIVE | Noted: 2018-08-30

## 2025-09-04 ENCOUNTER — OFFICE VISIT (OUTPATIENT)
Dept: ORTHOPEDIC SURGERY | Age: 75
End: 2025-09-04

## 2025-09-04 VITALS
HEART RATE: 57 BPM | SYSTOLIC BLOOD PRESSURE: 118 MMHG | OXYGEN SATURATION: 97 % | DIASTOLIC BLOOD PRESSURE: 75 MMHG | TEMPERATURE: 98.1 F | RESPIRATION RATE: 16 BRPM

## 2025-09-04 DIAGNOSIS — M70.42 PREPATELLAR BURSITIS OF LEFT KNEE: ICD-10-CM

## 2025-09-04 DIAGNOSIS — M17.12 PRIMARY OSTEOARTHRITIS OF LEFT KNEE: ICD-10-CM

## 2025-09-04 DIAGNOSIS — M76.892 TENDINITIS OF LEFT QUADRICEPS TENDON: Primary | ICD-10-CM

## 2025-09-04 RX ORDER — BUPIVACAINE HYDROCHLORIDE 2.5 MG/ML
1 INJECTION, SOLUTION INFILTRATION; PERINEURAL ONCE
Status: COMPLETED | OUTPATIENT
Start: 2025-09-04 | End: 2025-09-04

## 2025-09-04 RX ORDER — LIDOCAINE HYDROCHLORIDE 10 MG/ML
1 INJECTION, SOLUTION INFILTRATION; PERINEURAL ONCE
Status: COMPLETED | OUTPATIENT
Start: 2025-09-04 | End: 2025-09-04

## 2025-09-04 RX ORDER — TRIAMCINOLONE ACETONIDE 40 MG/ML
40 INJECTION, SUSPENSION INTRA-ARTICULAR; INTRAMUSCULAR ONCE
Status: COMPLETED | OUTPATIENT
Start: 2025-09-04 | End: 2025-09-04

## 2025-09-04 RX ADMIN — LIDOCAINE HYDROCHLORIDE 1 ML: 10 INJECTION, SOLUTION INFILTRATION; PERINEURAL at 12:54

## 2025-09-04 RX ADMIN — TRIAMCINOLONE ACETONIDE 40 MG: 40 INJECTION, SUSPENSION INTRA-ARTICULAR; INTRAMUSCULAR at 12:55

## 2025-09-04 RX ADMIN — BUPIVACAINE HYDROCHLORIDE 2.5 MG: 2.5 INJECTION, SOLUTION INFILTRATION; PERINEURAL at 12:54

## (undated) DEVICE — SUTURE SUTTAPE FIBERLINK 1.3MM WHT BLU CLS LOOP AR7535

## (undated) DEVICE — PACK PROCEDURE SURG GEN CUST

## (undated) DEVICE — SUTURE VCRL SZ 2-0 L27IN ABSRB UD L26MM SH 1/2 CIR J417H

## (undated) DEVICE — GOWN,SIRUS,POLYRNF,BRTHSLV,XLN/XL,20/CS: Brand: MEDLINE

## (undated) DEVICE — INTENDED FOR TISSUE SEPARATION, AND OTHER PROCEDURES THAT REQUIRE A SHARP SURGICAL BLADE TO PUNCTURE OR CUT.: Brand: BARD-PARKER ® STAINLESS STEEL BLADES

## (undated) DEVICE — ASPIRATOR FLR FLUID DIAMOND

## (undated) DEVICE — STANDARD HYPODERMIC NEEDLE,POLYPROPYLENE HUB: Brand: MONOJECT

## (undated) DEVICE — NEEDLE SUT KNEE LO PROF SCORPION

## (undated) DEVICE — IMMOBILIZER SHLDR L10.5-17IN D7IN SLNG W/ 15DEG ABD PLLW

## (undated) DEVICE — DRAPE,REIN 53X77,STERILE: Brand: MEDLINE

## (undated) DEVICE — 3M™ STERI-DRAPE™ U-DRAPE 1015: Brand: STERI-DRAPE™

## (undated) DEVICE — PAD,ABDOMINAL,5"X9",ST,LF,25/BX: Brand: MEDLINE INDUSTRIES, INC.

## (undated) DEVICE — PACK,SHOULDER SPLIT: Brand: MEDLINE

## (undated) DEVICE — CANNULA ARTHSCP L7CM DIA7MM TRNSLUC THRD FLX W/ NO SQUIRT

## (undated) DEVICE — COVER DSG W7 7 8XL11IN WHT POR CLTH PRECUT WTRPRF MEDIPORE

## (undated) DEVICE — ALCOHOL RUBBING ISO 16OZ 70%

## (undated) DEVICE — 1000 S-DRAPE TOWEL DRAPE 10/BX: Brand: STERI-DRAPE™

## (undated) DEVICE — SOLUTION IV IRRIG LACTATED RINGERS 3000ML 2B7487

## (undated) DEVICE — DRAPE,U/ SHT,SPLIT,PLAS,STERIL: Brand: MEDLINE

## (undated) DEVICE — SUPER TURBOVAC 90 INTEGRATED CABLE WAND ICW: Brand: COBLATION

## (undated) DEVICE — TUBING, SUCTION, 9/32" X 10', STRAIGHT: Brand: MEDLINE

## (undated) DEVICE — SUTURE TIGERTAPE TIGERWIRE SZ 2-0 L30IN NONABSORBABLE AR72377T

## (undated) DEVICE — 4-PORT MANIFOLD: Brand: NEPTUNE 2

## (undated) DEVICE — [AGGRESSIVE PLUS CUTTER, ARTHROSCOPIC SHAVER BLADE,  DO NOT RESTERILIZE,  DO NOT USE IF PACKAGE IS DAMAGED,  KEEP DRY,  KEEP AWAY FROM SUNLIGHT]: Brand: FORMULA

## (undated) DEVICE — CANNULA ARTHSCP L3CM ID8MM DBL DAM 1 PC MOLD LO PROF FLNG

## (undated) DEVICE — CHLORAPREP 26ML ORANGE

## (undated) DEVICE — MAT SURG W36XL56IN GRN FOAM ANTIFATIGUE NONSLIP DRISAFE

## (undated) DEVICE — NEEDLE SPNL L3.5IN PNK HUB S STL REG WALL FIT STYL W/ QNCKE

## (undated) DEVICE — PATIENT RETURN ELECTRODE, SINGLE-USE, CONTACT QUALITY MONITORING, ADULT, WITH 9FT CORD, FOR PATIENTS WEIGING OVER 33LBS. (15KG): Brand: MEGADYNE

## (undated) DEVICE — NEEDLE SUT PASS FOR ROT CUF LABRAL REP MULTFI SCORPION

## (undated) DEVICE — DOUBLE BASIN SET: Brand: MEDLINE INDUSTRIES, INC.

## (undated) DEVICE — BLADE CLIPPER GEN PURP NS

## (undated) DEVICE — TUBING PMP L16FT MAIN DISP FOR AR-6400 AR-6475

## (undated) DEVICE — STRIP,CLOSURE,WOUND,MEDI-STRIP,1/2X4: Brand: MEDLINE

## (undated) DEVICE — SYRINGE MED 30ML STD CLR PLAS LUERLOCK TIP N CTRL DISP

## (undated) DEVICE — TOWEL,OR,DSP,ST,BLUE,STD,6/PK,12PK/CS: Brand: MEDLINE

## (undated) DEVICE — DRESSING,GAUZE,XEROFORM,CURAD,1"X8",ST: Brand: CURAD

## (undated) DEVICE — GAUZE,SPONGE,4"X4",8PLY,STRL,LF,10/TRAY: Brand: MEDLINE

## (undated) DEVICE — SLEEVE TRAC SPANDEX LAT W/ 4IN COBAN SUPERFICIAL RAD NRV PD